# Patient Record
Sex: FEMALE | Race: WHITE | NOT HISPANIC OR LATINO | Employment: UNEMPLOYED | URBAN - METROPOLITAN AREA
[De-identification: names, ages, dates, MRNs, and addresses within clinical notes are randomized per-mention and may not be internally consistent; named-entity substitution may affect disease eponyms.]

---

## 2017-07-14 RX ORDER — IBUPROFEN 800 MG/1
800 TABLET ORAL EVERY 8 HOURS PRN
Qty: 21 TABLET | Refills: 1 | OUTPATIENT
Start: 2017-07-14

## 2017-07-14 NOTE — TELEPHONE ENCOUNTER
REFILL REQUEST FOR IBUPROFEN. PT DELIVERED ON 12/17/2017 NO SHOWED  PP VISIT ON 01/27/2017 AND HAS NOT MADE ANY OTHER APPTS.

## 2018-02-05 ENCOUNTER — TELEPHONE (OUTPATIENT)
Dept: OBSTETRICS AND GYNECOLOGY | Age: 23
End: 2018-02-05

## 2018-03-01 ENCOUNTER — OFFICE VISIT (OUTPATIENT)
Dept: OBSTETRICS AND GYNECOLOGY | Age: 23
End: 2018-03-01

## 2018-03-01 ENCOUNTER — PROCEDURE VISIT (OUTPATIENT)
Dept: OBSTETRICS AND GYNECOLOGY | Age: 23
End: 2018-03-01

## 2018-03-01 VITALS
WEIGHT: 223 LBS | HEIGHT: 62 IN | BODY MASS INDEX: 41.04 KG/M2 | DIASTOLIC BLOOD PRESSURE: 82 MMHG | SYSTOLIC BLOOD PRESSURE: 122 MMHG

## 2018-03-01 DIAGNOSIS — O36.80X0 ENCOUNTER TO DETERMINE FETAL VIABILITY OF PREGNANCY, SINGLE OR UNSPECIFIED FETUS: ICD-10-CM

## 2018-03-01 DIAGNOSIS — Z34.01 ENCOUNTER FOR SUPERVISION OF NORMAL FIRST PREGNANCY IN FIRST TRIMESTER: ICD-10-CM

## 2018-03-01 DIAGNOSIS — Z13.89 SCREENING FOR BLOOD OR PROTEIN IN URINE: Primary | ICD-10-CM

## 2018-03-01 LAB
BILIRUB BLD-MCNC: NEGATIVE MG/DL
CLARITY, POC: CLEAR
COLOR UR: YELLOW
GLUCOSE UR STRIP-MCNC: NEGATIVE MG/DL
KETONES UR QL: NEGATIVE
LEUKOCYTE EST, POC: ABNORMAL
NITRITE UR-MCNC: NEGATIVE MG/ML
PH UR: 6.5 [PH] (ref 5–8)
PROT UR STRIP-MCNC: ABNORMAL MG/DL
RBC # UR STRIP: NEGATIVE /UL
SP GR UR: 1.02 (ref 1–1.03)
UROBILINOGEN UR QL: NORMAL

## 2018-03-01 PROCEDURE — 99395 PREV VISIT EST AGE 18-39: CPT | Performed by: NURSE PRACTITIONER

## 2018-03-01 PROCEDURE — 99213 OFFICE O/P EST LOW 20 MIN: CPT | Performed by: NURSE PRACTITIONER

## 2018-03-01 PROCEDURE — 76817 TRANSVAGINAL US OBSTETRIC: CPT | Performed by: OBSTETRICS & GYNECOLOGY

## 2018-03-01 NOTE — PROGRESS NOTES
[facility]  Routine Annual Visit    3/1/2018    Patient: Rhina Marquez          MR#:[id number]      History of Present Illness    22 y.o. female  who presents for annual exam and for confirmation of pregnancy. US confirms IUP at 7 weeks 5 days. This is Rhina's fourth pregnancy. Hx of gestational hypertension, LGA. Vaginal deliveries  and 2016- delivery notes in system; spontaneous  . This pregnancy was planned and welcomed. Rhina has no complaints today.      Patient's last menstrual period was 2018 (exact date).  Obstetric History:  OB History      Para Term  AB Living    4 2 2 0 1 2    SAB TAB Ectopic Multiple Live Births    1 0 0 0 2        Obstetric Comments    16 ultrasound 22 weeks anatomy normal incomplete, estimated fetal weight 54th percentile.[facility]  11.15.16 - 32-3 weeks - EFW 80%, AC >97% - (5lb 3oz) - JHF   16 ultrasound 35 weeks 6 days estimated fetal weight 89th percentile, abdominal circumfere nce greater than 97th percentile, amniotic fluid index 18 cm, biophysical profile 8 out of 8. JKB         Menstrual History:     Patient's last menstrual period was 2018 (exact date).       Sexual History:       ________________________________________  Patient Active Problem List   Diagnosis   • Maternal history of obstetrical complications   • Maternal varicella, non-immune   • Rubella non-immune status, antepartum   • UTI in pregnancy   • Generalized convulsive seizures   • Palpitations   • Pregnancy   • Preeclampsia   •  (spontaneous vaginal delivery)       Past Medical History:   Diagnosis Date   • Anxiety    • Asthma    • Convulsive seizure     Neurologic workup to date is inconclusive for seizure disorder, patient did not follow through with EEG, saw neurologist Dr. Willam Calderon   • Eye infection    • Eye infection    • Mild preeclampsia    • Obstetrical complication    • Preeclampsia    • Rubella  non-immune status, antepartum    • UTI in pregnancy        Past Surgical History:   Procedure Laterality Date   • EAR TUBES         History   Smoking Status   • Former Smoker   Smokeless Tobacco   • Never Used       Family History   Problem Relation Age of Onset   • Hypertension Father    • Cancer Maternal Grandmother    • Stroke Maternal Grandfather    • Diabetes Maternal Grandfather    • Migraines Maternal Grandfather    • Dementia Maternal Grandfather    • Stroke Maternal Aunt    • Diabetes Maternal Uncle    • Migraines Mother    • Arthritis Mother        Prior to Admission medications    Medication Sig Start Date End Date Taking? Authorizing Provider   PRENATAL GUMMY VITAMIN Chew 1 each Daily.    Historical Provider, MD   acetaminophen (TYLENOL) 500 MG tablet Take 1,000 mg by mouth Every 6 (Six) Hours As Needed for mild pain (1-3).  3/1/18  Historical Provider, MD   ibuprofen (ADVIL,MOTRIN) 800 MG tablet Take 1 tablet by mouth Every 8 (Eight) Hours As Needed for mild pain (1-3). 12/19/16 3/1/18  Willam Crouch MD   Prenatal MV-Min-Fe Fum-FA-DHA (PRENATAL 1 PO) Take  by mouth.  3/1/18  Historical Provider, MD     ________________________________________    The following portions of the patient's history were reviewed and updated as appropriate: allergies, current medications, past family history, past medical history, past social history, past surgical history and problem list.    Review of Systems   Constitutional: Negative.    HENT: Negative.    Eyes: Negative for visual disturbance.   Respiratory: Negative for cough, shortness of breath and wheezing.    Cardiovascular: Negative for chest pain, palpitations and leg swelling.   Gastrointestinal: Negative for abdominal distention, abdominal pain, blood in stool, constipation, diarrhea, nausea and vomiting.   Endocrine: Negative for cold intolerance and heat intolerance.   Genitourinary: Negative for difficulty urinating, dyspareunia, dysuria, frequency,  "genital sores, hematuria, menstrual problem, pelvic pain, urgency, vaginal bleeding, vaginal discharge and vaginal pain.   Musculoskeletal: Negative.    Skin: Negative.    Neurological: Negative for dizziness, weakness, light-headedness, numbness and headaches.   Hematological: Negative.    Psychiatric/Behavioral: Negative.    Breasts: negative for lumps skin changes, dimpling, swelling, nipple changes/discharge bilaterally       Objective   Physical Exam    /82  Ht 157.5 cm (62\")  Wt 101 kg (223 lb)  LMP 01/06/2018 (Exact Date)  Breastfeeding? No  BMI 40.79 kg/m2   BP Readings from Last 3 Encounters:   03/01/18 122/82   12/19/16 138/88   12/09/16 134/92      Wt Readings from Last 3 Encounters:   03/01/18 101 kg (223 lb)   12/16/16 96.3 kg (212 lb 4.8 oz) (98 %, Z= 2.11)*   12/09/16 98.9 kg (218 lb) (99 %, Z= 2.19)*     * Growth percentiles are based on CDC 2-20 Years data.        BMI: Estimated body mass index is 40.79 kg/(m^2) as calculated from the following:    Height as of this encounter: 157.5 cm (62\").    Weight as of this encounter: 101 kg (223 lb).      General:   alert, appears stated age and cooperative   Heart: regular rate and rhythm, S1, S2 normal, no murmur, click, rub or gallop   Lungs: clear to auscultation bilaterally   Abdomen: soft, non-tender, without masses or organomegaly   Breast: inspection negative, no nipple discharge or bleeding, no masses or nodularity palpable   Vulva: Deferred   Vagina: deferred    Cervix: Deferred    Uterus: Deferred   Adnexa: Deferred     Assessment:    1. Normal annual exam   2. IUP at 7 weeks 5 days with MICHAEL 10/13/18  3.  Counseled on healthy lifestyle modifications, safe sex, condom use, and self breast exams.  4. Early pregnancy counseling provided   Patient is taking Prenatal vitamins  Problem list reviewed and updated  Reviewed routine prenatal care with the office to include but not limited to expected weight gain during pregnancy, Tylenol products " are fine, avoid aspirin and ibuprofen; Zika (travel restrictions/ok to use insect repellant); not to change cat litter; food restrictions; avoidance of alcohol, tobacco, drugs and saunas/hot tubs.   All questions answered.  5. SAB warnings  6.  Counseled on healthy lifestyle modifications, safe sex, condom use, and self breast exams.      RTO 4 weeks OB intake      Plan:    []  Rx:   []  Mammogram request made  []  PAP done  []  Occult fecal blood test (Insure)  [x]  Labs: prenatal   [x]  GC/Chl/TV  []  DEXA scan   []  Referral for colonoscopy:           Caroline Arreguin, JOAN  3/1/2018 11:21 AM

## 2018-03-02 LAB
ABO GROUP BLD: NORMAL
BLD GP AB SCN SERPL QL: NEGATIVE
ERYTHROCYTE [DISTWIDTH] IN BLOOD BY AUTOMATED COUNT: 14.4 % (ref 12.3–15.4)
HBA1C MFR BLD: 4.9 % (ref 4.8–5.6)
HBV SURFACE AG SERPL QL IA: NEGATIVE
HCT VFR BLD AUTO: 40.1 % (ref 34–46.6)
HCV AB S/CO SERPL IA: <0.1 S/CO RATIO (ref 0–0.9)
HGB A MFR BLD: 97.5 % (ref 96.4–98.8)
HGB A2 MFR BLD COLUMN CHROM: 2.5 % (ref 1.8–3.2)
HGB BLD-MCNC: 13.3 G/DL (ref 11.1–15.9)
HGB C MFR BLD: 0 %
HGB F MFR BLD: 0 % (ref 0–2)
HGB FRACT BLD-IMP: NORMAL
HGB S BLD QL SOLY: NEGATIVE
HGB S MFR BLD: 0 %
HIV 1+2 AB+HIV1 P24 AG SERPL QL IA: NON REACTIVE
MCH RBC QN AUTO: 29.9 PG (ref 26.6–33)
MCHC RBC AUTO-ENTMCNC: 33.2 G/DL (ref 31.5–35.7)
MCV RBC AUTO: 90 FL (ref 79–97)
PLATELET # BLD AUTO: 345 X10E3/UL (ref 150–379)
RBC # BLD AUTO: 4.45 X10E6/UL (ref 3.77–5.28)
RH BLD: POSITIVE
RPR SER QL: NON REACTIVE
RUBV IGG SERPL IA-ACNC: 2.36 INDEX
TSH SERPL DL<=0.005 MIU/L-ACNC: 1.64 UIU/ML (ref 0.45–4.5)
VZV IGG SER IA-ACNC: <135 INDEX
WBC # BLD AUTO: 11.1 X10E3/UL (ref 3.4–10.8)

## 2018-03-03 LAB
BACTERIA UR CULT: NORMAL
BACTERIA UR CULT: NORMAL
C TRACH RRNA SPEC QL NAA+PROBE: NEGATIVE
N GONORRHOEA RRNA SPEC QL NAA+PROBE: NEGATIVE
T VAGINALIS RRNA SPEC QL NAA+PROBE: NEGATIVE

## 2018-03-05 ENCOUNTER — TELEPHONE (OUTPATIENT)
Dept: OBSTETRICS AND GYNECOLOGY | Age: 23
End: 2018-03-05

## 2018-03-05 PROBLEM — O09.299 HX OF PREECLAMPSIA, PRIOR PREGNANCY, CURRENTLY PREGNANT: Status: ACTIVE | Noted: 2018-03-05

## 2018-03-05 NOTE — TELEPHONE ENCOUNTER
----- Message from JOAN Chavez sent at 3/5/2018  9:51 AM EST -----  Please inform patient her prenatal panel returned unremarkable. She is varicella non-immune. Immunization recommended postpartum. She she avoid contact with individuals with shingles or chicken pox. Thank you.

## 2018-03-27 ENCOUNTER — INITIAL PRENATAL (OUTPATIENT)
Dept: OBSTETRICS AND GYNECOLOGY | Age: 23
End: 2018-03-27

## 2018-03-27 VITALS — BODY MASS INDEX: 41.15 KG/M2 | WEIGHT: 225 LBS | DIASTOLIC BLOOD PRESSURE: 82 MMHG | SYSTOLIC BLOOD PRESSURE: 124 MMHG

## 2018-03-27 DIAGNOSIS — Z28.39 MATERNAL VARICELLA, NON-IMMUNE: ICD-10-CM

## 2018-03-27 DIAGNOSIS — O09.899 MATERNAL VARICELLA, NON-IMMUNE: ICD-10-CM

## 2018-03-27 DIAGNOSIS — Z34.81 PRENATAL CARE, SUBSEQUENT PREGNANCY IN FIRST TRIMESTER: Primary | ICD-10-CM

## 2018-03-27 DIAGNOSIS — Z13.89 SCREENING FOR BLOOD OR PROTEIN IN URINE: ICD-10-CM

## 2018-03-27 DIAGNOSIS — O09.299 HX OF PREECLAMPSIA, PRIOR PREGNANCY, CURRENTLY PREGNANT: ICD-10-CM

## 2018-03-27 LAB
BILIRUB BLD-MCNC: NEGATIVE MG/DL
CLARITY, POC: CLEAR
COLOR UR: YELLOW
GLUCOSE UR STRIP-MCNC: NEGATIVE MG/DL
KETONES UR QL: ABNORMAL
LEUKOCYTE EST, POC: ABNORMAL
NITRITE UR-MCNC: NEGATIVE MG/ML
PH UR: 6 [PH] (ref 5–8)
PROT UR STRIP-MCNC: NEGATIVE MG/DL
RBC # UR STRIP: NEGATIVE /UL
SP GR UR: 1.03 (ref 1–1.03)
UROBILINOGEN UR QL: NORMAL

## 2018-03-27 PROCEDURE — 99213 OFFICE O/P EST LOW 20 MIN: CPT | Performed by: OBSTETRICS & GYNECOLOGY

## 2018-03-27 NOTE — PROGRESS NOTES
Chief Complaint   Patient presents with   • Routine Prenatal Visit     cc:  OB Intake. No c/o.rlr       HPI: 23 y.o.  at 11w3d presents for ob intake - prenatal labs reviewed - denies complaints     Vitals:    18 1309   BP: 124/82   Weight: 102 kg (225 lb)       ROS:  GI:  Negative  : neg  Pulmonary: Negative     A/P  1. Intrauterine pregnancy at 11w3d   2. Pregnancy Risk:  HIGH RISK    Rhina was seen today for routine prenatal visit.    Diagnoses and all orders for this visit:    Prenatal care, subsequent pregnancy in first trimester    Screening for blood or protein in urine  -     POC Urinalysis Dipstick    Maternal varicella, non-immune    Hx of preeclampsia, prior pregnancy, currently pregnant  -     aspirin 81 MG tablet; Take 1 tablet by mouth Daily.        -----------------------  PLAN:   Return in about 4 weeks (around 2018), or ob check .  H/o PreE - start ASA at 12 weeks   OB intake done   Prenatal labs reviewed   VNI - varivax PP  Desires Nexgen today   SAB warnings     Anali Del Angel MD  3/27/2018 1:28 PM

## 2018-04-04 ENCOUNTER — TELEPHONE (OUTPATIENT)
Dept: OBSTETRICS AND GYNECOLOGY | Age: 23
End: 2018-04-04

## 2018-04-09 ENCOUNTER — TELEPHONE (OUTPATIENT)
Dept: OBSTETRICS AND GYNECOLOGY | Age: 23
End: 2018-04-09

## 2018-04-09 NOTE — TELEPHONE ENCOUNTER
----- Message from Anali Del Angel MD sent at 4/6/2018 10:07 AM EDT -----  Please call patient and notify of normal results of genetic screening and it's a boy!

## 2018-04-25 ENCOUNTER — ROUTINE PRENATAL (OUTPATIENT)
Dept: OBSTETRICS AND GYNECOLOGY | Age: 23
End: 2018-04-25

## 2018-04-25 VITALS — BODY MASS INDEX: 40.31 KG/M2 | WEIGHT: 220.4 LBS | SYSTOLIC BLOOD PRESSURE: 134 MMHG | DIASTOLIC BLOOD PRESSURE: 78 MMHG

## 2018-04-25 DIAGNOSIS — Z13.89 SCREENING FOR BLOOD OR PROTEIN IN URINE: Primary | ICD-10-CM

## 2018-04-25 DIAGNOSIS — O26.892 HEADACHE IN PREGNANCY, ANTEPARTUM, SECOND TRIMESTER: ICD-10-CM

## 2018-04-25 DIAGNOSIS — R51.9 HEADACHE IN PREGNANCY, ANTEPARTUM, SECOND TRIMESTER: ICD-10-CM

## 2018-04-25 DIAGNOSIS — Z34.02 ENCOUNTER FOR SUPERVISION OF NORMAL FIRST PREGNANCY IN SECOND TRIMESTER: ICD-10-CM

## 2018-04-25 LAB
BILIRUB BLD-MCNC: NEGATIVE MG/DL
CLARITY, POC: CLEAR
COLOR UR: YELLOW
GLUCOSE UR STRIP-MCNC: NEGATIVE MG/DL
KETONES UR QL: ABNORMAL
LEUKOCYTE EST, POC: NEGATIVE
NITRITE UR-MCNC: NEGATIVE MG/ML
PH UR: 6 [PH] (ref 5–8)
PROT UR STRIP-MCNC: ABNORMAL MG/DL
RBC # UR STRIP: NEGATIVE /UL
SP GR UR: 1.03 (ref 1–1.03)
UROBILINOGEN UR QL: NORMAL

## 2018-04-25 PROCEDURE — 99214 OFFICE O/P EST MOD 30 MIN: CPT | Performed by: NURSE PRACTITIONER

## 2018-04-25 NOTE — PROGRESS NOTES
HPI: 23 y.o.  at 15w4d presents for routine prenatal visit with complaint of intermittent headaches- admits headaches during each of her past pregnancies. Was worked up by neuro last pregnancy but pt did not care for the provider- willing to see new neurologist for further evaluation. Has not taken anything OTC for relief.     Vitals:    18 1333   BP: 134/78   Weight: 100 kg (220 lb 6.4 oz)     ROS:  GI:  Negative  : negative  Pulmonary: Negative     A/P  1. Intrauterine pregnancy at 15w4d     Rhina was seen today for routine prenatal visit.    Diagnoses and all orders for this visit:    Screening for blood or protein in urine  -     POC Urinalysis Dipstick    Encounter for supervision of normal first pregnancy in second trimester    Headache in pregnancy, antepartum, second trimester  -     Ambulatory Referral to Neurology        -----------------------  PLAN:   Return in about 4 weeks (around 2018), or if symptoms worsen or fail to improve.     Headaches- referral to neuro placed; comfort and preventative measures given; warning signs give; ER for severe headaches with neuro sxs  4 weeks anatomy scan and OB visit  SAB warnings      Caroline Arreguin, JOAN  2018 3:53 PM

## 2018-05-29 ENCOUNTER — ROUTINE PRENATAL (OUTPATIENT)
Dept: OBSTETRICS AND GYNECOLOGY | Age: 23
End: 2018-05-29

## 2018-05-29 ENCOUNTER — PROCEDURE VISIT (OUTPATIENT)
Dept: OBSTETRICS AND GYNECOLOGY | Age: 23
End: 2018-05-29

## 2018-05-29 VITALS — DIASTOLIC BLOOD PRESSURE: 70 MMHG | SYSTOLIC BLOOD PRESSURE: 118 MMHG | BODY MASS INDEX: 40.42 KG/M2 | WEIGHT: 221 LBS

## 2018-05-29 DIAGNOSIS — O09.299 HX OF PREECLAMPSIA, PRIOR PREGNANCY, CURRENTLY PREGNANT: ICD-10-CM

## 2018-05-29 DIAGNOSIS — Z36.89 SCREENING, ANTENATAL, FOR FETAL ANATOMIC SURVEY: Primary | ICD-10-CM

## 2018-05-29 DIAGNOSIS — O09.899 MATERNAL VARICELLA, NON-IMMUNE: ICD-10-CM

## 2018-05-29 DIAGNOSIS — Z36.9 ANTENATAL SCREENING ENCOUNTER: ICD-10-CM

## 2018-05-29 DIAGNOSIS — Z3A.20 20 WEEKS GESTATION OF PREGNANCY: ICD-10-CM

## 2018-05-29 DIAGNOSIS — Z28.39 MATERNAL VARICELLA, NON-IMMUNE: ICD-10-CM

## 2018-05-29 DIAGNOSIS — O99.212 OBESITY AFFECTING PREGNANCY IN SECOND TRIMESTER: ICD-10-CM

## 2018-05-29 DIAGNOSIS — Z34.82 PRENATAL CARE, SUBSEQUENT PREGNANCY IN SECOND TRIMESTER: Primary | ICD-10-CM

## 2018-05-29 DIAGNOSIS — M54.32 SCIATICA, LEFT SIDE: ICD-10-CM

## 2018-05-29 DIAGNOSIS — IMO0002 EVALUATE ANATOMY NOT SEEN ON PRIOR SONOGRAM: ICD-10-CM

## 2018-05-29 DIAGNOSIS — Z36.86 ENCOUNTER FOR ANTENATAL SCREENING FOR CERVICAL LENGTH: ICD-10-CM

## 2018-05-29 LAB
BILIRUB BLD-MCNC: NEGATIVE MG/DL
GLUCOSE UR STRIP-MCNC: NEGATIVE MG/DL
KETONES UR QL: NEGATIVE
LEUKOCYTE EST, POC: NEGATIVE
NITRITE UR-MCNC: NEGATIVE MG/ML
PH UR: 6.5 [PH] (ref 5–8)
PROT UR STRIP-MCNC: NEGATIVE MG/DL
RBC # UR STRIP: NEGATIVE /UL
SP GR UR: 1.03 (ref 1–1.03)
UROBILINOGEN UR QL: NORMAL

## 2018-05-29 PROCEDURE — 76817 TRANSVAGINAL US OBSTETRIC: CPT | Performed by: OBSTETRICS & GYNECOLOGY

## 2018-05-29 PROCEDURE — 76805 OB US >/= 14 WKS SNGL FETUS: CPT | Performed by: OBSTETRICS & GYNECOLOGY

## 2018-05-29 PROCEDURE — 99213 OFFICE O/P EST LOW 20 MIN: CPT | Performed by: OBSTETRICS & GYNECOLOGY

## 2018-05-29 NOTE — PROGRESS NOTES
Chief Complaint   Patient presents with   • Pregnancy Ultrasound     Pt here for routine ob check along with anatomy scan.        HPI: 23 y.o.  at 20w3d presents for prenatal care - denies complaints - normal anatomy today but incomplete      Vitals:    18 1527   BP: 118/70   Weight: 100 kg (221 lb)       ROS:  GI:  Negative  : neg  Pulmonary: Negative     A/P  1. Intrauterine pregnancy at 20w3d   2. Pregnancy Risk:  HIGH RISK    Rhina was seen today for pregnancy ultrasound.    Diagnoses and all orders for this visit:    Prenatal care, subsequent pregnancy in second trimester    20 weeks gestation of pregnancy  -     POC Urinalysis Dipstick     screening encounter  -     Alpha Fetoprotein, Maternal    Obesity affecting pregnancy in second trimester  -     Hemoglobin A1c    Hx of preeclampsia, prior pregnancy, currently pregnant    Maternal varicella, non-immune    Sciatica, left side    Evaluate anatomy not seen on prior sonogram        -----------------------  PLAN:   Return in about 4 weeks (around 2018), or ob check with me and repeat anatomy .  Desiires AFP today  H/O of preE - cannot tolerate ASA  Sciatica - referral to PT   Repeat anatomy 4 weeks     Anali Del Angel MD  2018 4:45 PM

## 2018-05-30 LAB — HBA1C MFR BLD: 5.1 % (ref 4.8–5.6)

## 2018-06-01 ENCOUNTER — TELEPHONE (OUTPATIENT)
Dept: OBSTETRICS AND GYNECOLOGY | Age: 23
End: 2018-06-01

## 2018-06-01 LAB
AFP ADJ MOM SERPL: 0.83
AFP INTERP SERPL-IMP: NORMAL
AFP INTERP SERPL-IMP: NORMAL
AFP SERPL-MCNC: 38.5 NG/ML
AGE AT DELIVERY: 21.5 YR
GA METHOD: NORMAL
GA: 20.4 WEEKS
IDDM PATIENT QL: NO
LABORATORY COMMENT REPORT: NORMAL
MULTIPLE PREGNANCY: NO
NEURAL TUBE DEFECT RISK FETUS: NORMAL %
RESULT: NORMAL

## 2018-06-01 NOTE — TELEPHONE ENCOUNTER
----- Message from Anali Del Angel MD sent at 5/31/2018  5:52 PM EDT -----  Please call patient and notify of normal results of hgb A1c

## 2018-06-03 ENCOUNTER — HOSPITAL ENCOUNTER (OUTPATIENT)
Facility: HOSPITAL | Age: 23
Setting detail: OBSERVATION
Discharge: HOME | End: 2018-06-03
Attending: OBSTETRICS & GYNECOLOGY | Admitting: OBSTETRICS & GYNECOLOGY

## 2018-06-03 VITALS
BODY MASS INDEX: 36.82 KG/M2 | HEART RATE: 94 BPM | WEIGHT: 221 LBS | DIASTOLIC BLOOD PRESSURE: 60 MMHG | HEIGHT: 65 IN | RESPIRATION RATE: 18 BRPM | SYSTOLIC BLOOD PRESSURE: 116 MMHG | TEMPERATURE: 99.2 F

## 2018-06-03 PROBLEM — Z34.90 PREGNANCY: Status: ACTIVE | Noted: 2018-06-03

## 2018-06-03 LAB
EXPIRATION DATE: NORMAL
Lab: NORMAL
PROT UR STRIP-MCNC: NEGATIVE MG/DL

## 2018-06-03 PROCEDURE — 81002 URINALYSIS NONAUTO W/O SCOPE: CPT | Performed by: OBSTETRICS & GYNECOLOGY

## 2018-06-03 PROCEDURE — G0378 HOSPITAL OBSERVATION PER HR: HCPCS

## 2018-06-03 NOTE — NURSING NOTE
"Arrived to Labor and Delivery with complaints of \"elevated blood pressure\" at [facility] twice today.  Admission Triage assessment completed.  Fetal heart rate doppler at 135.  Instructed to lay in left lateral before taking blood pressures.    "

## 2018-06-04 ENCOUNTER — TELEPHONE (OUTPATIENT)
Dept: OBSTETRICS AND GYNECOLOGY | Age: 23
End: 2018-06-04

## 2018-06-04 NOTE — NURSING NOTE
Discharged to home with instructions to call MD office in the AM if problems continue or to keep next MD appointment.  Instructed to return to labor and delivery with any concerns of self or fetus.

## 2018-06-04 NOTE — TELEPHONE ENCOUNTER
----- Message from Anali Del Angel MD sent at 6/4/2018  2:40 PM EDT -----  Please call patient and notify of normal results of AFP - screening for neural tube defects

## 2018-06-04 NOTE — TELEPHONE ENCOUNTER
"Rhina Marquez, a  at 21w1d with an MICHAEL of 10/13/2018, Date entered prior to episode creation, was seen at [facility] for a nonstress test.          Chief Complaint   Patient presents with   • Hypertension       went to   urgent care yesterday for \"'pink eye\", and they notiiced that her blood pressure was elevated.  Took blood pressure twice today at [facility] and was 150/100               Fetal heart rate 135 on admission     Sanam Suarez"

## 2018-06-04 NOTE — NON STRESS TEST
"  Rhina Marquez, a  at 21w1d with an MICHAEL of 10/13/2018, Date entered prior to episode creation, was seen at [facility] DEL[facility] for a nonstress test.    Chief Complaint   Patient presents with   • Hypertension     went to   urgent care yesterday for \"'pink eye\", and they notiiced that her blood pressure was elevated.  Took blood pressure twice today at [facility] and was 150/100              Fetal heart rate 135 on admission    Sanam Suarez"

## 2018-06-04 NOTE — TELEPHONE ENCOUNTER
Pt states was told yesterday that she needs to call office today so MD can write a prescription for a BP cuff?

## 2018-06-04 NOTE — DISCHARGE INSTR - LAB
Discharged to home with instructions to keep next MD appointment, and to call MD or return to labor and delivery with any concerns of self of fetus.

## 2018-06-06 ENCOUNTER — ROUTINE PRENATAL (OUTPATIENT)
Dept: OBSTETRICS AND GYNECOLOGY | Age: 23
End: 2018-06-06

## 2018-06-06 VITALS — WEIGHT: 221 LBS | SYSTOLIC BLOOD PRESSURE: 140 MMHG | DIASTOLIC BLOOD PRESSURE: 78 MMHG | BODY MASS INDEX: 36.78 KG/M2

## 2018-06-06 DIAGNOSIS — Z13.89 SCREENING FOR BLOOD OR PROTEIN IN URINE: Primary | ICD-10-CM

## 2018-06-06 DIAGNOSIS — O10.919 CHRONIC HYPERTENSION AFFECTING PREGNANCY: ICD-10-CM

## 2018-06-06 DIAGNOSIS — O09.299 HX OF PREECLAMPSIA, PRIOR PREGNANCY, CURRENTLY PREGNANT: ICD-10-CM

## 2018-06-06 DIAGNOSIS — G89.29 CHRONIC INTRACTABLE HEADACHE, UNSPECIFIED HEADACHE TYPE: ICD-10-CM

## 2018-06-06 DIAGNOSIS — R51.9 CHRONIC INTRACTABLE HEADACHE, UNSPECIFIED HEADACHE TYPE: ICD-10-CM

## 2018-06-06 PROBLEM — O26.892 HEADACHE IN PREGNANCY, ANTEPARTUM, SECOND TRIMESTER: Status: RESOLVED | Noted: 2018-04-25 | Resolved: 2018-06-06

## 2018-06-06 LAB
BILIRUB BLD-MCNC: NEGATIVE MG/DL
CLARITY, POC: CLEAR
COLOR UR: YELLOW
GLUCOSE UR STRIP-MCNC: NEGATIVE MG/DL
KETONES UR QL: NEGATIVE
LEUKOCYTE EST, POC: NORMAL
NITRITE UR-MCNC: NEGATIVE MG/ML
PH UR: 7 [PH] (ref 5–8)
PROT UR STRIP-MCNC: NEGATIVE MG/DL
RBC # UR STRIP: NEGATIVE /UL
SP GR UR: 1.02 (ref 1–1.03)
UROBILINOGEN UR QL: NORMAL

## 2018-06-06 PROCEDURE — 99214 OFFICE O/P EST MOD 30 MIN: CPT | Performed by: NURSE PRACTITIONER

## 2018-06-06 RX ORDER — ERYTHROMYCIN 5 MG/G
OINTMENT OPHTHALMIC
COMMUNITY
Start: 2018-05-31 | End: 2018-06-12

## 2018-06-06 RX ORDER — LABETALOL 200 MG/1
200 TABLET, FILM COATED ORAL EVERY 12 HOURS SCHEDULED
Qty: 60 TABLET | Refills: 3 | Status: SHIPPED | OUTPATIENT
Start: 2018-06-06 | End: 2018-09-16 | Stop reason: HOSPADM

## 2018-06-06 RX ORDER — OFLOXACIN 3 MG/ML
SOLUTION/ DROPS OPHTHALMIC
COMMUNITY
Start: 2018-06-02 | End: 2018-06-12

## 2018-06-06 NOTE — PROGRESS NOTES
Chief Complaint   Patient presents with   • Follow-up     elevated BP over weekend       HPI: 23 y.o.  at 21w4d presents for problem visit regarding concerns with elevated BPs over the weekend. Blood pressure mildly elevated in office today x 2. Chronic hypertension likely. Hx mild gestational hypertension with previous pregnancy. Baseline labs drawn today along with 24 urine. Elevated blood pressures is complicated by chronic headaches- referral to neuro has already been placed and pt is seeing on 18.    Vitals:    18 1311   BP: 140/78   Weight: 100 kg (221 lb)       ROS:  Neuro- +headache  GI:  Negative  : Negative  Pulmonary: Negative     A/P  1. Intrauterine pregnancy at 21w4d   2. Pregnancy Risk:  HIGH RISK    Rhina was seen today for follow-up.    Diagnoses and all orders for this visit:    Screening for blood or protein in urine  -     POC Urinalysis Dipstick    Chronic hypertension affecting pregnancy  -     Comprehensive Metabolic Panel  -     CBC & Differential  -     Protein, Urine, 24 Hour - Urine, Clean Catch    Hx of preeclampsia, prior pregnancy, currently pregnant  -     Comprehensive Metabolic Panel  -     CBC & Differential  -     Protein, Urine, 24 Hour - Urine, Clean Catch    Chronic intractable headache, unspecified headache type     - seeing neuro 18    -----------------------  PLAN:   Chronic HTN- CMP, CBC, and 24 hr urine  Begin labetalol 200 mg BID  Begin serial growths around 28 weeks  F/U 1 week for BP check    JOAN Leach  2018 1:51 PM

## 2018-06-07 LAB
ALBUMIN SERPL-MCNC: 3.7 G/DL (ref 3.5–5.5)
ALBUMIN/GLOB SERPL: 1.4 {RATIO} (ref 1.2–2.2)
ALP SERPL-CCNC: 74 IU/L (ref 39–117)
ALT SERPL-CCNC: 11 IU/L (ref 0–32)
AST SERPL-CCNC: 10 IU/L (ref 0–40)
BASOPHILS # BLD AUTO: 0 X10E3/UL (ref 0–0.2)
BASOPHILS NFR BLD AUTO: 0 %
BILIRUB SERPL-MCNC: <0.2 MG/DL (ref 0–1.2)
BUN SERPL-MCNC: 9 MG/DL (ref 6–20)
BUN/CREAT SERPL: 17 (ref 9–23)
CALCIUM SERPL-MCNC: 9.3 MG/DL (ref 8.7–10.2)
CHLORIDE SERPL-SCNC: 102 MMOL/L (ref 96–106)
CO2 SERPL-SCNC: 21 MMOL/L (ref 18–29)
CREAT SERPL-MCNC: 0.52 MG/DL (ref 0.57–1)
EOSINOPHIL # BLD AUTO: 0.2 X10E3/UL (ref 0–0.4)
EOSINOPHIL NFR BLD AUTO: 1 %
ERYTHROCYTE [DISTWIDTH] IN BLOOD BY AUTOMATED COUNT: 13.9 % (ref 12.3–15.4)
GFR SERPLBLD CREATININE-BSD FMLA CKD-EPI: 137 ML/MIN/1.73
GFR SERPLBLD CREATININE-BSD FMLA CKD-EPI: 158 ML/MIN/1.73
GLOBULIN SER CALC-MCNC: 2.6 G/DL (ref 1.5–4.5)
GLUCOSE SERPL-MCNC: 76 MG/DL (ref 65–99)
HCT VFR BLD AUTO: 39.1 % (ref 34–46.6)
HGB BLD-MCNC: 12.8 G/DL (ref 11.1–15.9)
IMM GRANULOCYTES # BLD: 0 X10E3/UL (ref 0–0.1)
IMM GRANULOCYTES NFR BLD: 0 %
LYMPHOCYTES # BLD AUTO: 2.2 X10E3/UL (ref 0.7–3.1)
LYMPHOCYTES NFR BLD AUTO: 16 %
MCH RBC QN AUTO: 29.6 PG (ref 26.6–33)
MCHC RBC AUTO-ENTMCNC: 32.7 G/DL (ref 31.5–35.7)
MCV RBC AUTO: 90 FL (ref 79–97)
MONOCYTES # BLD AUTO: 0.7 X10E3/UL (ref 0.1–0.9)
MONOCYTES NFR BLD AUTO: 5 %
NEUTROPHILS # BLD AUTO: 10.5 X10E3/UL (ref 1.4–7)
NEUTROPHILS NFR BLD AUTO: 78 %
PLATELET # BLD AUTO: 311 X10E3/UL (ref 150–379)
POTASSIUM SERPL-SCNC: 4.2 MMOL/L (ref 3.5–5.2)
PROT SERPL-MCNC: 6.3 G/DL (ref 6–8.5)
RBC # BLD AUTO: 4.33 X10E6/UL (ref 3.77–5.28)
SODIUM SERPL-SCNC: 140 MMOL/L (ref 134–144)
WBC # BLD AUTO: 13.7 X10E3/UL (ref 3.4–10.8)

## 2018-06-08 LAB
PROT 24H UR-MRATE: 165 MG/24HOURS (ref 0–150)
PROT UR-MCNC: 11 MG/DL

## 2018-06-12 ENCOUNTER — ROUTINE PRENATAL (OUTPATIENT)
Dept: OBSTETRICS AND GYNECOLOGY | Age: 23
End: 2018-06-12

## 2018-06-12 VITALS — DIASTOLIC BLOOD PRESSURE: 82 MMHG | WEIGHT: 222 LBS | SYSTOLIC BLOOD PRESSURE: 134 MMHG | BODY MASS INDEX: 36.94 KG/M2

## 2018-06-12 DIAGNOSIS — Z28.39 MATERNAL VARICELLA, NON-IMMUNE: ICD-10-CM

## 2018-06-12 DIAGNOSIS — IMO0002 EVALUATE ANATOMY NOT SEEN ON PRIOR SONOGRAM: ICD-10-CM

## 2018-06-12 DIAGNOSIS — Z13.89 SCREENING FOR BLOOD OR PROTEIN IN URINE: Primary | ICD-10-CM

## 2018-06-12 DIAGNOSIS — O99.212 OBESITY AFFECTING PREGNANCY IN SECOND TRIMESTER: ICD-10-CM

## 2018-06-12 DIAGNOSIS — O09.299 HX OF PREECLAMPSIA, PRIOR PREGNANCY, CURRENTLY PREGNANT: ICD-10-CM

## 2018-06-12 DIAGNOSIS — Z34.82 PRENATAL CARE, SUBSEQUENT PREGNANCY IN SECOND TRIMESTER: ICD-10-CM

## 2018-06-12 DIAGNOSIS — O09.899 MATERNAL VARICELLA, NON-IMMUNE: ICD-10-CM

## 2018-06-12 DIAGNOSIS — O10.919 CHRONIC HYPERTENSION AFFECTING PREGNANCY: ICD-10-CM

## 2018-06-12 LAB
BILIRUB BLD-MCNC: NEGATIVE MG/DL
CLARITY, POC: CLEAR
COLOR UR: YELLOW
GLUCOSE UR STRIP-MCNC: NEGATIVE MG/DL
KETONES UR QL: ABNORMAL
LEUKOCYTE EST, POC: NEGATIVE
NITRITE UR-MCNC: NEGATIVE MG/ML
PH UR: 6.5 [PH] (ref 5–8)
PROT UR STRIP-MCNC: ABNORMAL MG/DL
RBC # UR STRIP: NEGATIVE /UL
SP GR UR: 1.03 (ref 1–1.03)
UROBILINOGEN UR QL: NORMAL

## 2018-06-12 PROCEDURE — 99213 OFFICE O/P EST LOW 20 MIN: CPT | Performed by: OBSTETRICS & GYNECOLOGY

## 2018-06-12 RX ORDER — ALBUTEROL SULFATE 90 UG/1
2 AEROSOL, METERED RESPIRATORY (INHALATION) EVERY 6 HOURS PRN
COMMUNITY
Start: 2018-06-09 | End: 2020-11-03

## 2018-06-12 RX ORDER — BENZONATATE 100 MG/1
100 CAPSULE ORAL NIGHTLY
COMMUNITY
Start: 2018-06-09 | End: 2018-06-26

## 2018-06-12 RX ORDER — AMOXICILLIN 875 MG/1
875 TABLET, COATED ORAL EVERY 12 HOURS SCHEDULED
COMMUNITY
Start: 2018-06-09 | End: 2018-06-26

## 2018-06-12 NOTE — PROGRESS NOTES
Chief Complaint   Patient presents with   • Routine Prenatal Visit     cc:  Treated for URI, ear infection.  Just got over pink eye.  Treated for BP's.rlr       HPI: 23 y.o.  at 22w3d presents for prenatal care - denies los of fluid, vag bleeding, ctx +FM     Vitals:    18 1440   BP: 134/82   Weight: 101 kg (222 lb)       ROS:  GI:  Negative  : neg  Pulmonary: Negative     A/P  1. Intrauterine pregnancy at 22w3d   2. Pregnancy Risk:  HIGH RISK    Rhina was seen today for routine prenatal visit.    Diagnoses and all orders for this visit:    Screening for blood or protein in urine  -     POC Urinalysis Dipstick    Chronic hypertension affecting pregnancy    Obesity affecting pregnancy in second trimester    Maternal varicella, non-immune    Hx of preeclampsia, prior pregnancy, currently pregnant        -----------------------  PLAN:   Return in about 2 weeks (around 2018).  CHTN - cont Labetalol 200 mg BID - could not tolerate ASA   Incomplete anatomy - US two weeks    PTL warnings   Obesity - doing well with weight       Anali Del Angel MD  2018 3:05 PM

## 2018-06-26 ENCOUNTER — ROUTINE PRENATAL (OUTPATIENT)
Dept: OBSTETRICS AND GYNECOLOGY | Age: 23
End: 2018-06-26

## 2018-06-26 ENCOUNTER — PROCEDURE VISIT (OUTPATIENT)
Dept: OBSTETRICS AND GYNECOLOGY | Age: 23
End: 2018-06-26

## 2018-06-26 VITALS — DIASTOLIC BLOOD PRESSURE: 80 MMHG | BODY MASS INDEX: 36.78 KG/M2 | WEIGHT: 221 LBS | SYSTOLIC BLOOD PRESSURE: 128 MMHG

## 2018-06-26 DIAGNOSIS — R51.9 CHRONIC INTRACTABLE HEADACHE, UNSPECIFIED HEADACHE TYPE: ICD-10-CM

## 2018-06-26 DIAGNOSIS — O10.919 CHRONIC HYPERTENSION AFFECTING PREGNANCY: ICD-10-CM

## 2018-06-26 DIAGNOSIS — O99.212 OBESITY AFFECTING PREGNANCY IN SECOND TRIMESTER: ICD-10-CM

## 2018-06-26 DIAGNOSIS — Z28.39 MATERNAL VARICELLA, NON-IMMUNE: ICD-10-CM

## 2018-06-26 DIAGNOSIS — Z34.82 PRENATAL CARE, SUBSEQUENT PREGNANCY IN SECOND TRIMESTER: Primary | ICD-10-CM

## 2018-06-26 DIAGNOSIS — G89.29 CHRONIC INTRACTABLE HEADACHE, UNSPECIFIED HEADACHE TYPE: ICD-10-CM

## 2018-06-26 DIAGNOSIS — Z13.89 SCREENING FOR BLOOD OR PROTEIN IN URINE: ICD-10-CM

## 2018-06-26 DIAGNOSIS — O09.299 HX OF PREECLAMPSIA, PRIOR PREGNANCY, CURRENTLY PREGNANT: ICD-10-CM

## 2018-06-26 DIAGNOSIS — IMO0002 EVALUATE ANATOMY NOT SEEN ON PRIOR SONOGRAM: ICD-10-CM

## 2018-06-26 DIAGNOSIS — IMO0002 EVALUATE ANATOMY NOT SEEN ON PRIOR SONOGRAM: Primary | ICD-10-CM

## 2018-06-26 DIAGNOSIS — O09.899 MATERNAL VARICELLA, NON-IMMUNE: ICD-10-CM

## 2018-06-26 PROCEDURE — 99213 OFFICE O/P EST LOW 20 MIN: CPT | Performed by: OBSTETRICS & GYNECOLOGY

## 2018-06-26 PROCEDURE — 76816 OB US FOLLOW-UP PER FETUS: CPT | Performed by: OBSTETRICS & GYNECOLOGY

## 2018-06-26 NOTE — PROGRESS NOTES
Chief Complaint   Patient presents with   • Routine Prenatal Visit     cc:  No c/o. Has been checking her BP regularly has been running normal (115/70) and lately a little higher (120/80)       HPI: 23 y.o.  at 24w3d presents for prenatal care - denies loss of fluid, vag bleeding or ctx - +fM     Vitals:    18 1536   BP: 128/80   Weight: 100 kg (221 lb)       ROS:  GI:  Negative  : neg  Pulmonary: Negative     A/P  1. Intrauterine pregnancy at 24w3d   2. Pregnancy Risk:  HIGH RISK    Rhina was seen today for routine prenatal visit.    Diagnoses and all orders for this visit:    Prenatal care, subsequent pregnancy in second trimester    Screening for blood or protein in urine  -     POC Urinalysis Dipstick    Evaluate anatomy not seen on prior sonogram    Obesity affecting pregnancy in second trimester    Chronic hypertension affecting pregnancy    Chronic intractable headache, unspecified headache type    Hx of preeclampsia, prior pregnancy, currently pregnant    Maternal varicella, non-immune        -----------------------  PLAN:   Return in about 3 weeks (around 2018), or ob check with me and one-hour gtt .  pTL warnings   Normal growth and complete anatomy today   CHTN - stable on labetalol 200 mg bID   Obesity - 2 lb weight loss !      Anali Del Angel MD  2018 4:08 PM

## 2018-07-19 ENCOUNTER — ROUTINE PRENATAL (OUTPATIENT)
Dept: OBSTETRICS AND GYNECOLOGY | Age: 23
End: 2018-07-19

## 2018-07-19 VITALS — SYSTOLIC BLOOD PRESSURE: 120 MMHG | DIASTOLIC BLOOD PRESSURE: 80 MMHG | BODY MASS INDEX: 36.94 KG/M2 | WEIGHT: 222 LBS

## 2018-07-19 DIAGNOSIS — O10.919 CHRONIC HYPERTENSION AFFECTING PREGNANCY: ICD-10-CM

## 2018-07-19 DIAGNOSIS — Z13.89 SCREENING FOR BLOOD OR PROTEIN IN URINE: ICD-10-CM

## 2018-07-19 DIAGNOSIS — Z28.39 MATERNAL VARICELLA, NON-IMMUNE: ICD-10-CM

## 2018-07-19 DIAGNOSIS — O24.419 GESTATIONAL DIABETES MELLITUS (GDM) IN SECOND TRIMESTER, GESTATIONAL DIABETES METHOD OF CONTROL UNSPECIFIED: ICD-10-CM

## 2018-07-19 DIAGNOSIS — O99.012 ANEMIA AFFECTING PREGNANCY IN SECOND TRIMESTER: ICD-10-CM

## 2018-07-19 DIAGNOSIS — Z13.0 SCREENING FOR IRON DEFICIENCY ANEMIA: ICD-10-CM

## 2018-07-19 DIAGNOSIS — O09.92 SUPERVISION OF HIGH RISK PREGNANCY IN SECOND TRIMESTER: Primary | ICD-10-CM

## 2018-07-19 DIAGNOSIS — O09.299 HX OF PREECLAMPSIA, PRIOR PREGNANCY, CURRENTLY PREGNANT: ICD-10-CM

## 2018-07-19 DIAGNOSIS — O09.899 MATERNAL VARICELLA, NON-IMMUNE: ICD-10-CM

## 2018-07-19 DIAGNOSIS — O99.212 OBESITY AFFECTING PREGNANCY IN SECOND TRIMESTER: ICD-10-CM

## 2018-07-19 PROBLEM — O09.90 HIGH-RISK PREGNANCY SUPERVISION: Status: ACTIVE | Noted: 2018-07-19

## 2018-07-19 LAB
BILIRUB BLD-MCNC: NEGATIVE MG/DL
GLUCOSE UR STRIP-MCNC: NEGATIVE MG/DL
KETONES UR QL: NEGATIVE
LEUKOCYTE EST, POC: NEGATIVE
NITRITE UR-MCNC: NEGATIVE MG/ML
PH UR: 7 [PH] (ref 5–8)
PROT UR STRIP-MCNC: NEGATIVE MG/DL
RBC # UR STRIP: NEGATIVE /UL
SP GR UR: 1.03 (ref 1–1.03)
UROBILINOGEN UR QL: NORMAL

## 2018-07-19 PROCEDURE — 99213 OFFICE O/P EST LOW 20 MIN: CPT | Performed by: OBSTETRICS & GYNECOLOGY

## 2018-07-19 PROCEDURE — 90471 IMMUNIZATION ADMIN: CPT | Performed by: OBSTETRICS & GYNECOLOGY

## 2018-07-19 PROCEDURE — 90715 TDAP VACCINE 7 YRS/> IM: CPT | Performed by: OBSTETRICS & GYNECOLOGY

## 2018-07-19 NOTE — PROGRESS NOTES
Chief Complaint   Patient presents with   • Routine Prenatal Visit     h-h , tdap , 1hr gtt, patient doing well, over the weekend patient had elevated /92       HPI: 23 y.o.  at 27w5d presents for prenatal care - denies loss of fluid, vaginal bleeding, ctx +FM      Vitals:    18 0931   BP: 120/80   Weight: 101 kg (222 lb)       ROS:  GI:  Negative  : neg  Pulmonary: Negative     A/P  1. Intrauterine pregnancy at 27w5d   2. Pregnancy Risk:  HIGH RISK    Rhina was seen today for routine prenatal visit.    Diagnoses and all orders for this visit:    Supervision of high risk pregnancy in second trimester    Screening for blood or protein in urine  -     POC Urinalysis Dipstick    Screening for iron deficiency anemia  -     Hemoglobin & Hematocrit, Blood    Gestational diabetes mellitus (GDM) in second trimester, gestational diabetes method of control unspecified  -     Gestational Screen 1 Hr ([facility])    Chronic hypertension affecting pregnancy    Obesity affecting pregnancy in second trimester    Hx of preeclampsia, prior pregnancy, currently pregnant    Maternal varicella, non-immune    Other orders  -     Tdap Vaccine Greater Than or Equal To 6yo IM        -----------------------  PLAN:   Return in about 2 weeks (around 2018), or ob check and growth uS .  PTL warnings   CHTN - con Labetalol 200 BID   Strict PreE warnings   Obesity - has lost weight     Anali Del Angel MD  2018 9:56 AM

## 2018-07-20 ENCOUNTER — TELEPHONE (OUTPATIENT)
Dept: OBSTETRICS AND GYNECOLOGY | Age: 23
End: 2018-07-20

## 2018-07-20 PROBLEM — O99.019 ANEMIA AFFECTING PREGNANCY: Status: ACTIVE | Noted: 2018-07-20

## 2018-07-20 LAB
GLUCOSE 1H P 50 G GLC PO SERPL-MCNC: 80 MG/DL (ref 65–139)
HCT VFR BLD AUTO: 34 % (ref 35.6–45.5)
HGB BLD-MCNC: 11.1 G/DL (ref 11.9–15.5)

## 2018-07-20 RX ORDER — IRON, FOLIC ACID, CYANOCOBALAMIN, ASCORBIC ACID, AND DOCUSATE SODIUM 138; 88.5; 55; 13.2; 1.4; 16.8 MG/1; MG/1; MG/1; MG/1; MG/1; UG/1
1 TABLET, FILM COATED ORAL DAILY
Qty: 30 TABLET | Refills: 5 | Status: ON HOLD | OUTPATIENT
Start: 2018-07-20 | End: 2018-09-07

## 2018-07-20 NOTE — PROGRESS NOTES
Please call patient and notify of normal results of one-hour gtt, but mildly anemic - new prenatal with iron sent to pharmacy

## 2018-07-31 ENCOUNTER — ROUTINE PRENATAL (OUTPATIENT)
Dept: OBSTETRICS AND GYNECOLOGY | Age: 23
End: 2018-07-31

## 2018-07-31 VITALS — DIASTOLIC BLOOD PRESSURE: 75 MMHG | SYSTOLIC BLOOD PRESSURE: 120 MMHG | BODY MASS INDEX: 36.94 KG/M2 | WEIGHT: 222 LBS

## 2018-07-31 DIAGNOSIS — O09.299 HX OF PREECLAMPSIA, PRIOR PREGNANCY, CURRENTLY PREGNANT: ICD-10-CM

## 2018-07-31 DIAGNOSIS — Z13.89 SCREENING FOR BLOOD OR PROTEIN IN URINE: ICD-10-CM

## 2018-07-31 DIAGNOSIS — O09.93 SUPERVISION OF HIGH RISK PREGNANCY IN THIRD TRIMESTER: Primary | ICD-10-CM

## 2018-07-31 DIAGNOSIS — O10.919 CHRONIC HYPERTENSION AFFECTING PREGNANCY: ICD-10-CM

## 2018-07-31 DIAGNOSIS — Z28.39 MATERNAL VARICELLA, NON-IMMUNE: ICD-10-CM

## 2018-07-31 DIAGNOSIS — O99.013 ANEMIA AFFECTING PREGNANCY IN THIRD TRIMESTER: ICD-10-CM

## 2018-07-31 DIAGNOSIS — O09.899 MATERNAL VARICELLA, NON-IMMUNE: ICD-10-CM

## 2018-07-31 DIAGNOSIS — O99.212 OBESITY AFFECTING PREGNANCY IN SECOND TRIMESTER: ICD-10-CM

## 2018-07-31 LAB
BILIRUB BLD-MCNC: NEGATIVE MG/DL
GLUCOSE UR STRIP-MCNC: NEGATIVE MG/DL
KETONES UR QL: ABNORMAL
LEUKOCYTE EST, POC: NEGATIVE
NITRITE UR-MCNC: NEGATIVE MG/ML
PH UR: 6.5 [PH] (ref 5–8)
PROT UR STRIP-MCNC: NEGATIVE MG/DL
RBC # UR STRIP: NEGATIVE /UL
SP GR UR: 1.03 (ref 1–1.03)
UROBILINOGEN UR QL: NORMAL

## 2018-07-31 PROCEDURE — 99213 OFFICE O/P EST LOW 20 MIN: CPT | Performed by: OBSTETRICS & GYNECOLOGY

## 2018-07-31 RX ORDER — FERROUS SULFATE 325(65) MG
325 TABLET ORAL
Qty: 30 TABLET | Refills: 6 | Status: SHIPPED | OUTPATIENT
Start: 2018-07-31 | End: 2018-09-09 | Stop reason: HOSPADM

## 2018-07-31 NOTE — TELEPHONE ENCOUNTER
File Link     Scan on 7/21/2018 : PA REQEUST FERRALET 7/21/18 FORD   Key Information     Document ID File Type Document Type   [id number] Image MEDICATIONS - SCAN   Description: PA REQEUST FERRALET 7/21/18 FORD   Import Information     Attached At Date Time User   Encounter Level 7/21/2018     Encounter     Scanned Document on 7/21/18 with Generic Waqas Rivers MD         Progress notes     No notes of this type exist for this encounter.

## 2018-08-06 ENCOUNTER — PROCEDURE VISIT (OUTPATIENT)
Dept: OBSTETRICS AND GYNECOLOGY | Age: 23
End: 2018-08-06

## 2018-08-06 DIAGNOSIS — Z36.89 ULTRASOUND SCAN TO CHECK INTERVAL GROWTH OF FETUS: Primary | ICD-10-CM

## 2018-08-06 DIAGNOSIS — O10.919 CHRONIC HYPERTENSION AFFECTING PREGNANCY: ICD-10-CM

## 2018-08-06 DIAGNOSIS — O99.212 OBESITY AFFECTING PREGNANCY IN SECOND TRIMESTER: ICD-10-CM

## 2018-08-06 PROCEDURE — 76816 OB US FOLLOW-UP PER FETUS: CPT | Performed by: OBSTETRICS & GYNECOLOGY

## 2018-08-06 NOTE — PROGRESS NOTES
Ultrasound Note     2018    Patient:  Rhina Marquez      MR#:[id number]    23 y.o.   for interval growth     Patient Active Problem List   Diagnosis   • Maternal varicella, non-immune   • Generalized convulsive seizures (CMS/HCC)   • Hx of preeclampsia, prior pregnancy, currently pregnant   • Obesity affecting pregnancy in second trimester   • Sciatica, left side   • Pregnancy   • Chronic hypertension affecting pregnancy   • Chronic headaches   • High-risk pregnancy supervision   • Anemia affecting pregnancy       [See the scanned report in the media tab for more details]    Impression    1.  Intrauterine pregnancy at 30w2d  2.  Normal Interval Growth with EFW at 76 % and AC at 93%.    Relevant comparison data available:  [x]           Carlo Sanderson MD  2018 4:50 PM

## 2018-08-14 ENCOUNTER — ROUTINE PRENATAL (OUTPATIENT)
Dept: OBSTETRICS AND GYNECOLOGY | Age: 23
End: 2018-08-14

## 2018-08-14 VITALS — WEIGHT: 220 LBS | BODY MASS INDEX: 36.61 KG/M2 | SYSTOLIC BLOOD PRESSURE: 120 MMHG | DIASTOLIC BLOOD PRESSURE: 70 MMHG

## 2018-08-14 DIAGNOSIS — O99.013 ANEMIA AFFECTING PREGNANCY IN THIRD TRIMESTER: ICD-10-CM

## 2018-08-14 DIAGNOSIS — O09.299 HX OF PREECLAMPSIA, PRIOR PREGNANCY, CURRENTLY PREGNANT: ICD-10-CM

## 2018-08-14 DIAGNOSIS — O09.93 SUPERVISION OF HIGH RISK PREGNANCY IN THIRD TRIMESTER: ICD-10-CM

## 2018-08-14 DIAGNOSIS — Z13.89 SCREENING FOR BLOOD OR PROTEIN IN URINE: Primary | ICD-10-CM

## 2018-08-14 DIAGNOSIS — O09.899 MATERNAL VARICELLA, NON-IMMUNE: ICD-10-CM

## 2018-08-14 DIAGNOSIS — R56.9 GENERALIZED CONVULSIVE SEIZURES (HCC): ICD-10-CM

## 2018-08-14 DIAGNOSIS — O10.919 CHRONIC HYPERTENSION AFFECTING PREGNANCY: ICD-10-CM

## 2018-08-14 DIAGNOSIS — O99.212 OBESITY AFFECTING PREGNANCY IN SECOND TRIMESTER: ICD-10-CM

## 2018-08-14 DIAGNOSIS — Z28.39 MATERNAL VARICELLA, NON-IMMUNE: ICD-10-CM

## 2018-08-14 LAB
BILIRUB BLD-MCNC: NEGATIVE MG/DL
CLARITY, POC: CLEAR
COLOR UR: YELLOW
GLUCOSE UR STRIP-MCNC: NEGATIVE MG/DL
KETONES UR QL: ABNORMAL
LEUKOCYTE EST, POC: NEGATIVE
NITRITE UR-MCNC: NEGATIVE MG/ML
PH UR: 6 [PH] (ref 5–8)
PROT UR STRIP-MCNC: ABNORMAL MG/DL
RBC # UR STRIP: ABNORMAL /UL
SP GR UR: 1.03 (ref 1–1.03)
UROBILINOGEN UR QL: NORMAL

## 2018-08-14 PROCEDURE — 99213 OFFICE O/P EST LOW 20 MIN: CPT | Performed by: OBSTETRICS & GYNECOLOGY

## 2018-08-14 NOTE — PROGRESS NOTES
Chief Complaint   Patient presents with   • Routine Prenatal Visit     having dizziness, past week had abdominal pain almost felt like having contractions LEBATALOL 200 MG BID       HPI: 23 y.o.  at 31w3d presents for prenatal care - denies loss of fluid, vag bleeding, ctx +FM     Vitals:    18 1511   BP: 120/70   Weight: 99.8 kg (220 lb)       ROS:  GI:  Negative  : neg  Pulmonary: Negative     A/P  1. Intrauterine pregnancy at 31w3d   2. Pregnancy Risk:  HIGH RISK    Rhina was seen today for routine prenatal visit.    Diagnoses and all orders for this visit:    Screening for blood or protein in urine  -     POC Urinalysis Dipstick    Chronic hypertension affecting pregnancy    Obesity affecting pregnancy in second trimester    Generalized convulsive seizures (CMS/HCC)    Anemia affecting pregnancy in third trimester    Supervision of high risk pregnancy in third trimester    Maternal varicella, non-immune    Hx of preeclampsia, prior pregnancy, currently pregnant        -----------------------  PLAN:   Return in about 1 week (around 2018), or ob check and BPP .  CHTN - stable on Labetalol 200 mg BID   Growth wnl last week  Obesity - 3 lb weight loss  Anemia - cont Iron  PTL warnings     Discussed trying labetalol 100 mg bID to keep BP from dropping too low  Anali Del Angel MD  2018 3:34 PM

## 2018-08-21 ENCOUNTER — ROUTINE PRENATAL (OUTPATIENT)
Dept: OBSTETRICS AND GYNECOLOGY | Age: 23
End: 2018-08-21

## 2018-08-21 ENCOUNTER — PROCEDURE VISIT (OUTPATIENT)
Dept: OBSTETRICS AND GYNECOLOGY | Age: 23
End: 2018-08-21

## 2018-08-21 VITALS — SYSTOLIC BLOOD PRESSURE: 120 MMHG | DIASTOLIC BLOOD PRESSURE: 72 MMHG | WEIGHT: 223 LBS | BODY MASS INDEX: 37.11 KG/M2

## 2018-08-21 DIAGNOSIS — O99.013 ANEMIA AFFECTING PREGNANCY IN THIRD TRIMESTER: ICD-10-CM

## 2018-08-21 DIAGNOSIS — O09.899 MATERNAL VARICELLA, NON-IMMUNE: ICD-10-CM

## 2018-08-21 DIAGNOSIS — Z13.89 SCREENING FOR BLOOD OR PROTEIN IN URINE: ICD-10-CM

## 2018-08-21 DIAGNOSIS — O21.9 NAUSEA AND VOMITING IN PREGNANCY: ICD-10-CM

## 2018-08-21 DIAGNOSIS — O99.212 OBESITY AFFECTING PREGNANCY IN SECOND TRIMESTER: ICD-10-CM

## 2018-08-21 DIAGNOSIS — O09.299 HX OF PREECLAMPSIA, PRIOR PREGNANCY, CURRENTLY PREGNANT: ICD-10-CM

## 2018-08-21 DIAGNOSIS — O10.919 CHRONIC HYPERTENSION AFFECTING PREGNANCY: Primary | ICD-10-CM

## 2018-08-21 DIAGNOSIS — O09.93 SUPERVISION OF HIGH RISK PREGNANCY IN THIRD TRIMESTER: Primary | ICD-10-CM

## 2018-08-21 DIAGNOSIS — R10.13 CHRONIC EPIGASTRIC PAIN: ICD-10-CM

## 2018-08-21 DIAGNOSIS — G89.29 CHRONIC EPIGASTRIC PAIN: ICD-10-CM

## 2018-08-21 DIAGNOSIS — Z28.39 MATERNAL VARICELLA, NON-IMMUNE: ICD-10-CM

## 2018-08-21 DIAGNOSIS — O10.919 CHRONIC HYPERTENSION AFFECTING PREGNANCY: ICD-10-CM

## 2018-08-21 LAB
BILIRUB BLD-MCNC: NEGATIVE MG/DL
BILIRUB BLD-MCNC: NEGATIVE MG/DL
CLARITY, POC: CLEAR
COLOR UR: YELLOW
GLUCOSE UR STRIP-MCNC: NEGATIVE MG/DL
GLUCOSE UR STRIP-MCNC: NEGATIVE MG/DL
KETONES UR QL: NEGATIVE
KETONES UR QL: NEGATIVE
LEUKOCYTE EST, POC: NEGATIVE
LEUKOCYTE EST, POC: NEGATIVE
NITRITE UR-MCNC: NEGATIVE MG/ML
NITRITE UR-MCNC: NEGATIVE MG/ML
PH UR: 6 [PH] (ref 5–8)
PH UR: 6 [PH] (ref 5–8)
PROT UR STRIP-MCNC: ABNORMAL MG/DL
PROT UR STRIP-MCNC: ABNORMAL MG/DL
RBC # UR STRIP: NEGATIVE /UL
RBC # UR STRIP: NEGATIVE /UL
SP GR UR: 1.03 (ref 1–1.03)
SP GR UR: 1.03 (ref 1–1.03)
UROBILINOGEN UR QL: NORMAL
UROBILINOGEN UR QL: NORMAL

## 2018-08-21 PROCEDURE — 76819 FETAL BIOPHYS PROFIL W/O NST: CPT | Performed by: OBSTETRICS & GYNECOLOGY

## 2018-08-21 PROCEDURE — 99213 OFFICE O/P EST LOW 20 MIN: CPT | Performed by: OBSTETRICS & GYNECOLOGY

## 2018-08-21 NOTE — PROGRESS NOTES
Chief Complaint   Patient presents with   • Routine Prenatal Visit     labetalol 200bid,  no c/o       HPI: 23 y.o.  at 32w3d presents for prenatal care - denies loss of fluid, vag bleeding or ctx +FM     Vitals:    18 0922   BP: 120/72   Weight: 101 kg (223 lb)       ROS:  GI:  Negative  : neg  Pulmonary: Negative     A/P  1. Intrauterine pregnancy at 32w3d   2. Pregnancy Risk:  HIGH RISK    Rhina was seen today for routine prenatal visit.    Diagnoses and all orders for this visit:    Supervision of high risk pregnancy in third trimester    Screening for blood or protein in urine  -     POC Urinalysis Dipstick  -     POC Urinalysis Dipstick    Obesity affecting pregnancy in second trimester    Chronic hypertension affecting pregnancy    Anemia affecting pregnancy in third trimester    Maternal varicella, non-immune    Hx of preeclampsia, prior pregnancy, currently pregnant    Nausea and vomiting in pregnancy  -     US Gallbladder; Future    Chronic epigastric pain  -     US Gallbladder; Future        -----------------------  PLAN:   Return in about 1 week (around 2018), or ob check and BPP .  CHTN - cont labetalol 200 BID -BP stable   Anemia - cont Iron   BPP today    Cont weekly BPP      Anali Del Angel MD  2018 10:22 AM

## 2018-08-22 ENCOUNTER — HOSPITAL ENCOUNTER (OUTPATIENT)
Dept: ULTRASOUND IMAGING | Facility: HOSPITAL | Age: 23
Discharge: HOME | End: 2018-08-22
Attending: OBSTETRICS & GYNECOLOGY | Admitting: OBSTETRICS & GYNECOLOGY

## 2018-08-22 DIAGNOSIS — O21.9 NAUSEA AND VOMITING IN PREGNANCY: ICD-10-CM

## 2018-08-22 DIAGNOSIS — G89.29 CHRONIC EPIGASTRIC PAIN: ICD-10-CM

## 2018-08-22 DIAGNOSIS — R10.13 CHRONIC EPIGASTRIC PAIN: ICD-10-CM

## 2018-08-22 PROCEDURE — 76705 ECHO EXAM OF ABDOMEN: CPT

## 2018-08-23 ENCOUNTER — TELEPHONE (OUTPATIENT)
Dept: OBSTETRICS AND GYNECOLOGY | Age: 23
End: 2018-08-23

## 2018-08-23 NOTE — TELEPHONE ENCOUNTER
----- Message from Anali Del Angel MD sent at 8/23/2018  7:18 AM EDT -----  Please call patient and notify of normal results of gallbladder ultrasound

## 2018-08-28 ENCOUNTER — PROCEDURE VISIT (OUTPATIENT)
Dept: OBSTETRICS AND GYNECOLOGY | Age: 23
End: 2018-08-28

## 2018-08-28 ENCOUNTER — ROUTINE PRENATAL (OUTPATIENT)
Dept: OBSTETRICS AND GYNECOLOGY | Age: 23
End: 2018-08-28

## 2018-08-28 VITALS — WEIGHT: 224 LBS | SYSTOLIC BLOOD PRESSURE: 120 MMHG | BODY MASS INDEX: 37.28 KG/M2 | DIASTOLIC BLOOD PRESSURE: 80 MMHG

## 2018-08-28 DIAGNOSIS — O09.93 SUPERVISION OF HIGH RISK PREGNANCY IN THIRD TRIMESTER: Primary | ICD-10-CM

## 2018-08-28 DIAGNOSIS — Z36.89 ULTRASOUND SCAN TO CHECK INTERVAL GROWTH OF FETUS: ICD-10-CM

## 2018-08-28 DIAGNOSIS — O10.919 CHRONIC HYPERTENSION DURING PREGNANCY: Primary | ICD-10-CM

## 2018-08-28 DIAGNOSIS — O36.63X0 EXCESSIVE FETAL GROWTH AFFECTING MANAGEMENT OF PREGNANCY IN THIRD TRIMESTER, SINGLE OR UNSPECIFIED FETUS: ICD-10-CM

## 2018-08-28 DIAGNOSIS — O99.013 ANEMIA AFFECTING PREGNANCY IN THIRD TRIMESTER: ICD-10-CM

## 2018-08-28 DIAGNOSIS — O99.212 OBESITY AFFECTING PREGNANCY IN SECOND TRIMESTER: ICD-10-CM

## 2018-08-28 DIAGNOSIS — Z28.39 MATERNAL VARICELLA, NON-IMMUNE: ICD-10-CM

## 2018-08-28 DIAGNOSIS — O10.919 CHRONIC HYPERTENSION AFFECTING PREGNANCY: ICD-10-CM

## 2018-08-28 DIAGNOSIS — Z13.89 SCREENING FOR BLOOD OR PROTEIN IN URINE: ICD-10-CM

## 2018-08-28 DIAGNOSIS — O09.299 HX OF PREECLAMPSIA, PRIOR PREGNANCY, CURRENTLY PREGNANT: ICD-10-CM

## 2018-08-28 DIAGNOSIS — O09.899 MATERNAL VARICELLA, NON-IMMUNE: ICD-10-CM

## 2018-08-28 LAB
BILIRUB BLD-MCNC: ABNORMAL MG/DL
GLUCOSE UR STRIP-MCNC: NEGATIVE MG/DL
KETONES UR QL: ABNORMAL
LEUKOCYTE EST, POC: NEGATIVE
NITRITE UR-MCNC: NEGATIVE MG/ML
PH UR: 6 [PH] (ref 5–8)
PROT UR STRIP-MCNC: ABNORMAL MG/DL
RBC # UR STRIP: NEGATIVE /UL
SP GR UR: 1.03 (ref 1–1.03)
UROBILINOGEN UR QL: NORMAL

## 2018-08-28 PROCEDURE — 99213 OFFICE O/P EST LOW 20 MIN: CPT | Performed by: OBSTETRICS & GYNECOLOGY

## 2018-08-28 PROCEDURE — 76819 FETAL BIOPHYS PROFIL W/O NST: CPT | Performed by: OBSTETRICS & GYNECOLOGY

## 2018-08-28 PROCEDURE — 76816 OB US FOLLOW-UP PER FETUS: CPT | Performed by: OBSTETRICS & GYNECOLOGY

## 2018-08-28 NOTE — PROGRESS NOTES
"Chief Complaint   Patient presents with   • Routine Prenatal Visit     feeling hot today , has a feeling  like \"going pass out \" .  Temp 98.4, Repeat /82.       HPI: 23 y.o.  at 33w3d presents for prenatal care - denies loss of fluid, vag bleeding, ctx +FM  Vitals:    18 1517   BP: 120/80   Weight: 102 kg (224 lb)       ROS:  GI:  Negative  : neg  Pulmonary: Negative     A/P  1. Intrauterine pregnancy at 33w3d   2. Pregnancy Risk:  HIGH RISK    Rhina was seen today for routine prenatal visit.    Diagnoses and all orders for this visit:    Supervision of high risk pregnancy in third trimester    Screening for blood or protein in urine  -     POC Urinalysis Dipstick    Chronic hypertension affecting pregnancy    Obesity affecting pregnancy in second trimester    Anemia affecting pregnancy in third trimester    Maternal varicella, non-immune    Hx of preeclampsia, prior pregnancy, currently pregnant    Excessive fetal growth affecting management of pregnancy in third trimester, single or unspecified fetus        -----------------------  PLAN:   Return in about 1 week (around 2018), or ob check and BPP.  PTL warnings   CHTN - cont labetalol 200 mg BID   Mild dehydration - enc increased H20 intake  LGA - repeat growth 3-4 weeks   Weekly bpps   PTL warnings       Anali Del Angel MD  2018 3:44 PM    "

## 2018-09-06 ENCOUNTER — ROUTINE PRENATAL (OUTPATIENT)
Dept: OBSTETRICS AND GYNECOLOGY | Age: 23
End: 2018-09-06

## 2018-09-06 ENCOUNTER — PROCEDURE VISIT (OUTPATIENT)
Dept: OBSTETRICS AND GYNECOLOGY | Age: 23
End: 2018-09-06

## 2018-09-06 VITALS — SYSTOLIC BLOOD PRESSURE: 120 MMHG | BODY MASS INDEX: 37.28 KG/M2 | DIASTOLIC BLOOD PRESSURE: 80 MMHG | WEIGHT: 224 LBS

## 2018-09-06 DIAGNOSIS — Z28.39 MATERNAL VARICELLA, NON-IMMUNE: ICD-10-CM

## 2018-09-06 DIAGNOSIS — O10.919 CHRONIC HYPERTENSION AFFECTING PREGNANCY: ICD-10-CM

## 2018-09-06 DIAGNOSIS — Z13.89 SCREENING FOR BLOOD OR PROTEIN IN URINE: ICD-10-CM

## 2018-09-06 DIAGNOSIS — O36.63X0 EXCESSIVE FETAL GROWTH AFFECTING MANAGEMENT OF PREGNANCY IN THIRD TRIMESTER, SINGLE OR UNSPECIFIED FETUS: ICD-10-CM

## 2018-09-06 DIAGNOSIS — O09.899 MATERNAL VARICELLA, NON-IMMUNE: ICD-10-CM

## 2018-09-06 DIAGNOSIS — O09.93 SUPERVISION OF HIGH RISK PREGNANCY IN THIRD TRIMESTER: Primary | ICD-10-CM

## 2018-09-06 DIAGNOSIS — O10.919 CHRONIC HYPERTENSION AFFECTING PREGNANCY: Primary | ICD-10-CM

## 2018-09-06 DIAGNOSIS — O09.299 HX OF PREECLAMPSIA, PRIOR PREGNANCY, CURRENTLY PREGNANT: ICD-10-CM

## 2018-09-06 DIAGNOSIS — O99.213 OBESITY AFFECTING PREGNANCY IN THIRD TRIMESTER: ICD-10-CM

## 2018-09-06 DIAGNOSIS — O99.212 OBESITY AFFECTING PREGNANCY IN SECOND TRIMESTER: ICD-10-CM

## 2018-09-06 DIAGNOSIS — O99.013 ANEMIA AFFECTING PREGNANCY IN THIRD TRIMESTER: ICD-10-CM

## 2018-09-06 DIAGNOSIS — R56.9 GENERALIZED CONVULSIVE SEIZURES (HCC): ICD-10-CM

## 2018-09-06 LAB
BILIRUB BLD-MCNC: NEGATIVE MG/DL
GLUCOSE UR STRIP-MCNC: NEGATIVE MG/DL
KETONES UR QL: NEGATIVE
LEUKOCYTE EST, POC: NEGATIVE
NITRITE UR-MCNC: NEGATIVE MG/ML
PH UR: 6.5 [PH] (ref 5–8)
PROT UR STRIP-MCNC: ABNORMAL MG/DL
RBC # UR STRIP: NEGATIVE /UL
SP GR UR: 1.02 (ref 1–1.03)
UROBILINOGEN UR QL: NORMAL

## 2018-09-06 PROCEDURE — 76819 FETAL BIOPHYS PROFIL W/O NST: CPT | Performed by: OBSTETRICS & GYNECOLOGY

## 2018-09-06 PROCEDURE — 99213 OFFICE O/P EST LOW 20 MIN: CPT | Performed by: OBSTETRICS & GYNECOLOGY

## 2018-09-06 NOTE — PROGRESS NOTES
Chief Complaint   Patient presents with   • Routine Prenatal Visit     c/o congestion, labetalol 200mg bid       HPI: 23 y.o.  at 34w5d presents for prenatal care - denies loss of fluid, vag bleeding, ctx +FM     Vitals:    18 1356   BP: 120/80   Weight: 102 kg (224 lb)       ROS:  GI:  Negative  : neg  Pulmonary: Negative     A/P  1. Intrauterine pregnancy at 34w5d   2. Pregnancy Risk:  HIGH RISK    Rhina was seen today for routine prenatal visit.    Diagnoses and all orders for this visit:    Supervision of high risk pregnancy in third trimester    Screening for blood or protein in urine  -     POC Urinalysis Dipstick    Chronic hypertension affecting pregnancy    Obesity affecting pregnancy in second trimester    Generalized convulsive seizures (CMS/HCC)    Anemia affecting pregnancy in third trimester    Excessive fetal growth affecting management of pregnancy in third trimester, single or unspecified fetus    Maternal varicella, non-immune    Hx of preeclampsia, prior pregnancy, currently pregnant        -----------------------  PLAN:   Return in about 1 week (around 2018), or ob check and BPP.  CHTN - stable on Labetalol 200 mg bID   BPP today 8 - repeat next week   PTL warnings   PreE warnings   LGA - repeat growth 2-3 weeks       Anali Del Angel MD  2018 2:41 PM

## 2018-09-07 ENCOUNTER — ANESTHESIA (OUTPATIENT)
Dept: LABOR AND DELIVERY | Facility: HOSPITAL | Age: 23
End: 2018-09-07

## 2018-09-07 ENCOUNTER — HOSPITAL ENCOUNTER (INPATIENT)
Facility: HOSPITAL | Age: 23
LOS: 2 days | Discharge: HOME | End: 2018-09-09
Attending: OBSTETRICS & GYNECOLOGY | Admitting: OBSTETRICS & GYNECOLOGY

## 2018-09-07 ENCOUNTER — ANESTHESIA EVENT (OUTPATIENT)
Dept: LABOR AND DELIVERY | Facility: HOSPITAL | Age: 23
End: 2018-09-07

## 2018-09-07 DIAGNOSIS — Z3A.34 34 WEEKS GESTATION OF PREGNANCY: Primary | ICD-10-CM

## 2018-09-07 DIAGNOSIS — O09.899 MATERNAL VARICELLA, NON-IMMUNE: ICD-10-CM

## 2018-09-07 DIAGNOSIS — Z28.39 MATERNAL VARICELLA, NON-IMMUNE: ICD-10-CM

## 2018-09-07 PROBLEM — O60.03 PRETERM LABOR IN THIRD TRIMESTER WITHOUT DELIVERY: Status: ACTIVE | Noted: 2018-09-07

## 2018-09-07 PROBLEM — Z34.90 PREGNANT: Status: ACTIVE | Noted: 2018-09-07

## 2018-09-07 PROBLEM — O16.3 ELEVATED BLOOD PRESSURE COMPLICATING PREGNANCY, ANTEPARTUM, THIRD TRIMESTER: Status: ACTIVE | Noted: 2018-09-07

## 2018-09-07 LAB
ABO GROUP BLD: NORMAL
ALBUMIN SERPL-MCNC: 3.6 G/DL (ref 3.5–5.2)
ALBUMIN/GLOB SERPL: 1.2 G/DL
ALP SERPL-CCNC: 120 U/L (ref 39–117)
ALT SERPL W P-5'-P-CCNC: 16 U/L (ref 1–33)
ANION GAP SERPL CALCULATED.3IONS-SCNC: 13.4 MMOL/L
AST SERPL-CCNC: 15 U/L (ref 1–32)
BASOPHILS # BLD AUTO: 0.02 10*3/MM3 (ref 0–0.2)
BASOPHILS NFR BLD AUTO: 0.1 % (ref 0–1.5)
BILIRUB SERPL-MCNC: 0.3 MG/DL (ref 0.1–1.2)
BLD GP AB SCN SERPL QL: NEGATIVE
BUN BLD-MCNC: 6 MG/DL (ref 6–20)
BUN/CREAT SERPL: 11.5 (ref 7–25)
CALCIUM SPEC-SCNC: 8.9 MG/DL (ref 8.6–10.5)
CHLORIDE SERPL-SCNC: 104 MMOL/L (ref 98–107)
CO2 SERPL-SCNC: 19.6 MMOL/L (ref 22–29)
CREAT BLD-MCNC: 0.52 MG/DL (ref 0.57–1)
CREAT BLD-MCNC: 0.52 MG/DL (ref 0.57–1)
DEPRECATED RDW RBC AUTO: 40.3 FL (ref 37–54)
EOSINOPHIL # BLD AUTO: 0.1 10*3/MM3 (ref 0–0.7)
EOSINOPHIL NFR BLD AUTO: 0.6 % (ref 0.3–6.2)
ERYTHROCYTE [DISTWIDTH] IN BLOOD BY AUTOMATED COUNT: 13.4 % (ref 11.7–13)
EXPIRATION DATE: NORMAL
GFR SERPL CREATININE-BSD FRML MDRD: 149 ML/MIN/1.73
GFR SERPL CREATININE-BSD FRML MDRD: 149 ML/MIN/1.73
GLOBULIN UR ELPH-MCNC: 2.9 GM/DL
GLUCOSE BLD-MCNC: 75 MG/DL (ref 65–99)
HCT VFR BLD AUTO: 34.6 % (ref 35.6–45.5)
HGB BLD-MCNC: 11 G/DL (ref 11.9–15.5)
IMM GRANULOCYTES # BLD: 0.12 10*3/MM3 (ref 0–0.03)
IMM GRANULOCYTES NFR BLD: 0.7 % (ref 0–0.5)
LYMPHOCYTES # BLD AUTO: 1.88 10*3/MM3 (ref 0.9–4.8)
LYMPHOCYTES NFR BLD AUTO: 11.7 % (ref 19.6–45.3)
Lab: NORMAL
MCH RBC QN AUTO: 26.5 PG (ref 26.9–32)
MCHC RBC AUTO-ENTMCNC: 31.8 G/DL (ref 32.4–36.3)
MCV RBC AUTO: 83.4 FL (ref 80.5–98.2)
MONOCYTES # BLD AUTO: 0.98 10*3/MM3 (ref 0.2–1.2)
MONOCYTES NFR BLD AUTO: 6.1 % (ref 5–12)
NEUTROPHILS # BLD AUTO: 13.06 10*3/MM3 (ref 1.9–8.1)
NEUTROPHILS NFR BLD AUTO: 81.5 % (ref 42.7–76)
PLATELET # BLD AUTO: 252 10*3/MM3 (ref 140–500)
PMV BLD AUTO: 10.7 FL (ref 6–12)
POTASSIUM BLD-SCNC: 3.8 MMOL/L (ref 3.5–5.2)
PROT SERPL-MCNC: 6.5 G/DL (ref 6–8.5)
PROT UR STRIP-MCNC: NEGATIVE MG/DL
RBC # BLD AUTO: 4.15 10*6/MM3 (ref 3.9–5.2)
RH BLD: POSITIVE
SODIUM BLD-SCNC: 137 MMOL/L (ref 136–145)
T&S EXPIRATION DATE: NORMAL
WBC NRBC COR # BLD: 16.04 10*3/MM3 (ref 4.5–10.7)

## 2018-09-07 PROCEDURE — 82565 ASSAY OF CREATININE: CPT | Performed by: OBSTETRICS & GYNECOLOGY

## 2018-09-07 PROCEDURE — 25010000002 BETAMETHASONE ACET & SOD PHOS PER 4 MG: Performed by: OBSTETRICS & GYNECOLOGY

## 2018-09-07 PROCEDURE — 81002 URINALYSIS NONAUTO W/O SCOPE: CPT | Performed by: OBSTETRICS & GYNECOLOGY

## 2018-09-07 PROCEDURE — C1755 CATHETER, INTRASPINAL: HCPCS | Performed by: ANESTHESIOLOGY

## 2018-09-07 PROCEDURE — 85025 COMPLETE CBC W/AUTO DIFF WBC: CPT | Performed by: OBSTETRICS & GYNECOLOGY

## 2018-09-07 PROCEDURE — 88307 TISSUE EXAM BY PATHOLOGIST: CPT

## 2018-09-07 PROCEDURE — 25010000002 PENICILLIN G POTASSIUM PER 600000 UNITS: Performed by: OBSTETRICS & GYNECOLOGY

## 2018-09-07 PROCEDURE — 86850 RBC ANTIBODY SCREEN: CPT | Performed by: OBSTETRICS & GYNECOLOGY

## 2018-09-07 PROCEDURE — 63710000001 DIPHENHYDRAMINE PER 50 MG: Performed by: ANESTHESIOLOGY

## 2018-09-07 PROCEDURE — 86900 BLOOD TYPING SEROLOGIC ABO: CPT | Performed by: OBSTETRICS & GYNECOLOGY

## 2018-09-07 PROCEDURE — 3E03329 INTRODUCTION OF OTHER ANTI-INFECTIVE INTO PERIPHERAL VEIN, PERCUTANEOUS APPROACH: ICD-10-PCS | Performed by: OBSTETRICS & GYNECOLOGY

## 2018-09-07 PROCEDURE — 86901 BLOOD TYPING SEROLOGIC RH(D): CPT | Performed by: OBSTETRICS & GYNECOLOGY

## 2018-09-07 PROCEDURE — 59410 OBSTETRICAL CARE: CPT | Performed by: OBSTETRICS & GYNECOLOGY

## 2018-09-07 PROCEDURE — 0KQM0ZZ REPAIR PERINEUM MUSCLE, OPEN APPROACH: ICD-10-PCS | Performed by: OBSTETRICS & GYNECOLOGY

## 2018-09-07 PROCEDURE — 80053 COMPREHEN METABOLIC PANEL: CPT | Performed by: OBSTETRICS & GYNECOLOGY

## 2018-09-07 RX ORDER — LABETALOL 200 MG/1
200 TABLET, FILM COATED ORAL EVERY 12 HOURS SCHEDULED
Status: DISCONTINUED | OUTPATIENT
Start: 2018-09-07 | End: 2018-09-09 | Stop reason: HOSPADM

## 2018-09-07 RX ORDER — ACETAMINOPHEN 325 MG/1
650 TABLET ORAL EVERY 4 HOURS PRN
Status: DISCONTINUED | OUTPATIENT
Start: 2018-09-07 | End: 2018-09-07

## 2018-09-07 RX ORDER — PENICILLIN G 3000000 [IU]/50ML
3 INJECTION, SOLUTION INTRAVENOUS EVERY 4 HOURS
Status: DISCONTINUED | OUTPATIENT
Start: 2018-09-07 | End: 2018-09-07

## 2018-09-07 RX ORDER — OXYCODONE HYDROCHLORIDE AND ACETAMINOPHEN 5; 325 MG/1; MG/1
1 TABLET ORAL EVERY 4 HOURS PRN
Status: DISCONTINUED | OUTPATIENT
Start: 2018-09-07 | End: 2018-09-09 | Stop reason: HOSPADM

## 2018-09-07 RX ORDER — FAMOTIDINE 10 MG/ML
20 INJECTION, SOLUTION INTRAVENOUS ONCE AS NEEDED
Status: DISCONTINUED | OUTPATIENT
Start: 2018-09-07 | End: 2018-09-07

## 2018-09-07 RX ORDER — OXYTOCIN-SODIUM CHLORIDE 0.9% IV SOLN 30 UNIT/500ML 30-0.9/5 UT/ML-%
250 SOLUTION INTRAVENOUS CONTINUOUS
Status: DISPENSED | OUTPATIENT
Start: 2018-09-07 | End: 2018-09-07

## 2018-09-07 RX ORDER — MISOPROSTOL 200 UG/1
800 TABLET ORAL AS NEEDED
Status: DISCONTINUED | OUTPATIENT
Start: 2018-09-07 | End: 2018-09-07 | Stop reason: HOSPADM

## 2018-09-07 RX ORDER — PHYTONADIONE 1 MG/.5ML
INJECTION, EMULSION INTRAMUSCULAR; INTRAVENOUS; SUBCUTANEOUS
Status: DISPENSED
Start: 2018-09-07 | End: 2018-09-08

## 2018-09-07 RX ORDER — IBUPROFEN 600 MG/1
600 TABLET ORAL EVERY 8 HOURS PRN
Status: DISCONTINUED | OUTPATIENT
Start: 2018-09-07 | End: 2018-09-09 | Stop reason: HOSPADM

## 2018-09-07 RX ORDER — DIPHENHYDRAMINE HYDROCHLORIDE 50 MG/ML
12.5 INJECTION INTRAMUSCULAR; INTRAVENOUS EVERY 8 HOURS PRN
Status: DISCONTINUED | OUTPATIENT
Start: 2018-09-07 | End: 2018-09-07

## 2018-09-07 RX ORDER — ONDANSETRON 2 MG/ML
4 INJECTION INTRAMUSCULAR; INTRAVENOUS ONCE AS NEEDED
Status: DISCONTINUED | OUTPATIENT
Start: 2018-09-07 | End: 2018-09-07

## 2018-09-07 RX ORDER — SODIUM CHLORIDE, SODIUM LACTATE, POTASSIUM CHLORIDE, CALCIUM CHLORIDE 600; 310; 30; 20 MG/100ML; MG/100ML; MG/100ML; MG/100ML
125 INJECTION, SOLUTION INTRAVENOUS CONTINUOUS
Status: DISCONTINUED | OUTPATIENT
Start: 2018-09-07 | End: 2018-09-07

## 2018-09-07 RX ORDER — OXYTOCIN-SODIUM CHLORIDE 0.9% IV SOLN 30 UNIT/500ML 30-0.9/5 UT/ML-%
999 SOLUTION INTRAVENOUS ONCE
Status: COMPLETED | OUTPATIENT
Start: 2018-09-07 | End: 2018-09-07

## 2018-09-07 RX ORDER — ONDANSETRON 2 MG/ML
4 INJECTION INTRAMUSCULAR; INTRAVENOUS EVERY 6 HOURS PRN
Status: DISCONTINUED | OUTPATIENT
Start: 2018-09-07 | End: 2018-09-09 | Stop reason: HOSPADM

## 2018-09-07 RX ORDER — LABETALOL 200 MG/1
200 TABLET, FILM COATED ORAL EVERY 12 HOURS SCHEDULED
Status: DISCONTINUED | OUTPATIENT
Start: 2018-09-07 | End: 2018-09-07

## 2018-09-07 RX ORDER — METHYLERGONOVINE MALEATE 0.2 MG/ML
200 INJECTION INTRAVENOUS ONCE AS NEEDED
Status: DISCONTINUED | OUTPATIENT
Start: 2018-09-07 | End: 2018-09-07 | Stop reason: HOSPADM

## 2018-09-07 RX ORDER — ONDANSETRON 4 MG/1
4 TABLET, FILM COATED ORAL EVERY 6 HOURS PRN
Status: DISCONTINUED | OUTPATIENT
Start: 2018-09-07 | End: 2018-09-07

## 2018-09-07 RX ORDER — ONDANSETRON 2 MG/ML
4 INJECTION INTRAMUSCULAR; INTRAVENOUS EVERY 6 HOURS PRN
Status: DISCONTINUED | OUTPATIENT
Start: 2018-09-07 | End: 2018-09-07

## 2018-09-07 RX ORDER — CARBOPROST TROMETHAMINE 250 UG/ML
250 INJECTION, SOLUTION INTRAMUSCULAR AS NEEDED
Status: DISCONTINUED | OUTPATIENT
Start: 2018-09-07 | End: 2018-09-07 | Stop reason: HOSPADM

## 2018-09-07 RX ORDER — OXYTOCIN-SODIUM CHLORIDE 0.9% IV SOLN 30 UNIT/500ML 30-0.9/5 UT/ML-%
125 SOLUTION INTRAVENOUS CONTINUOUS PRN
Status: COMPLETED | OUTPATIENT
Start: 2018-09-07 | End: 2018-09-07

## 2018-09-07 RX ORDER — SODIUM CHLORIDE 0.9 % (FLUSH) 0.9 %
1-10 SYRINGE (ML) INJECTION AS NEEDED
Status: DISCONTINUED | OUTPATIENT
Start: 2018-09-07 | End: 2018-09-09 | Stop reason: HOSPADM

## 2018-09-07 RX ORDER — EPHEDRINE SULFATE 50 MG/ML
5 INJECTION, SOLUTION INTRAVENOUS AS NEEDED
Status: DISCONTINUED | OUTPATIENT
Start: 2018-09-07 | End: 2018-09-07

## 2018-09-07 RX ORDER — DOCUSATE SODIUM 100 MG/1
100 CAPSULE, LIQUID FILLED ORAL 2 TIMES DAILY
Status: DISCONTINUED | OUTPATIENT
Start: 2018-09-07 | End: 2018-09-09 | Stop reason: HOSPADM

## 2018-09-07 RX ORDER — OXYCODONE HYDROCHLORIDE AND ACETAMINOPHEN 5; 325 MG/1; MG/1
2 TABLET ORAL EVERY 4 HOURS PRN
Status: DISCONTINUED | OUTPATIENT
Start: 2018-09-07 | End: 2018-09-09 | Stop reason: HOSPADM

## 2018-09-07 RX ORDER — BISACODYL 10 MG
10 SUPPOSITORY, RECTAL RECTAL DAILY PRN
Status: DISCONTINUED | OUTPATIENT
Start: 2018-09-08 | End: 2018-09-09 | Stop reason: HOSPADM

## 2018-09-07 RX ORDER — SODIUM CHLORIDE 0.9 % (FLUSH) 0.9 %
1-10 SYRINGE (ML) INJECTION AS NEEDED
Status: DISCONTINUED | OUTPATIENT
Start: 2018-09-07 | End: 2018-09-07

## 2018-09-07 RX ORDER — MINERAL OIL
OIL (ML) MISCELLANEOUS ONCE
Status: DISCONTINUED | OUTPATIENT
Start: 2018-09-07 | End: 2018-09-07

## 2018-09-07 RX ORDER — ONDANSETRON 4 MG/1
4 TABLET, FILM COATED ORAL EVERY 8 HOURS PRN
Status: DISCONTINUED | OUTPATIENT
Start: 2018-09-07 | End: 2018-09-09 | Stop reason: HOSPADM

## 2018-09-07 RX ORDER — ONDANSETRON 4 MG/1
4 TABLET, ORALLY DISINTEGRATING ORAL EVERY 6 HOURS PRN
Status: DISCONTINUED | OUTPATIENT
Start: 2018-09-07 | End: 2018-09-07

## 2018-09-07 RX ORDER — ERYTHROMYCIN 5 MG/G
OINTMENT OPHTHALMIC
Status: DISPENSED
Start: 2018-09-07 | End: 2018-09-08

## 2018-09-07 RX ORDER — ZOLPIDEM TARTRATE 5 MG/1
5 TABLET ORAL NIGHTLY PRN
Status: DISCONTINUED | OUTPATIENT
Start: 2018-09-07 | End: 2018-09-09 | Stop reason: HOSPADM

## 2018-09-07 RX ORDER — BETAMETHASONE SODIUM PHOSPHATE AND BETAMETHASONE ACETATE 3; 3 MG/ML; MG/ML
12 INJECTION, SUSPENSION INTRA-ARTICULAR; INTRALESIONAL; INTRAMUSCULAR; SOFT TISSUE ONCE
Status: COMPLETED | OUTPATIENT
Start: 2018-09-07 | End: 2018-09-07

## 2018-09-07 RX ORDER — ALBUTEROL SULFATE 90 UG/1
2 AEROSOL, METERED RESPIRATORY (INHALATION) EVERY 6 HOURS PRN
Status: DISCONTINUED | OUTPATIENT
Start: 2018-09-07 | End: 2018-09-09 | Stop reason: HOSPADM

## 2018-09-07 RX ORDER — DIPHENHYDRAMINE HCL 25 MG
25 CAPSULE ORAL ONCE
Status: COMPLETED | OUTPATIENT
Start: 2018-09-07 | End: 2018-09-07

## 2018-09-07 RX ADMIN — OXYTOCIN 125 ML/HR: 10 INJECTION INTRAVENOUS at 19:13

## 2018-09-07 RX ADMIN — DIPHENHYDRAMINE HYDROCHLORIDE 25 MG: 25 CAPSULE ORAL at 19:46

## 2018-09-07 RX ADMIN — BETAMETHASONE SODIUM PHOSPHATE AND BETAMETHASONE ACETATE 12 MG: 3; 3 INJECTION, SUSPENSION INTRA-ARTICULAR; INTRALESIONAL; INTRAMUSCULAR at 15:59

## 2018-09-07 RX ADMIN — SODIUM CHLORIDE 5 MILLION UNITS: 900 INJECTION INTRAVENOUS at 15:26

## 2018-09-07 RX ADMIN — SODIUM CHLORIDE, POTASSIUM CHLORIDE, SODIUM LACTATE AND CALCIUM CHLORIDE 125 ML/HR: 600; 310; 30; 20 INJECTION, SOLUTION INTRAVENOUS at 16:30

## 2018-09-07 RX ADMIN — Medication 10 ML/HR: at 15:39

## 2018-09-07 RX ADMIN — ACETAMINOPHEN 650 MG: 325 TABLET, FILM COATED ORAL at 15:51

## 2018-09-07 RX ADMIN — OXYTOCIN 999 ML/HR: 10 INJECTION INTRAVENOUS at 17:54

## 2018-09-07 RX ADMIN — SODIUM CHLORIDE, POTASSIUM CHLORIDE, SODIUM LACTATE AND CALCIUM CHLORIDE 1000 ML: 600; 310; 30; 20 INJECTION, SOLUTION INTRAVENOUS at 15:26

## 2018-09-07 NOTE — NEONATAL DELIVERY NOTE
[facility]  Vaginal Delivery Note    Delivery     Delivery:       YOB: 2016    Time of Birth: 5:51 PM      Anesthesia:       Delivering clinician:      Forceps?   No   Vacuum? No    Shoulder dystocia present: No        Delivery narrative:  24 yo  at 34 6/7 wk gest admitted in active labor and progressed quickly to C/C/BBOW. Pregnancy complicated by chronic HTN. Epidural given for pain management. BMZ x1 dose given today. IV penicillin given for h/o GBS+ status with previous pregnancies and for prematurity. AROM with clear fluid when complete. Pushed with a few contractions for  of live viable male infant, OA position. Shoulders and body easily delivered. Bulb suctioned after delivery of clear fluid.  team present for delivery. Cord clamped at cut at 30 seconds. Infant placed under radiant linares with  NP in attendance. Baby required O2 in delivery room. Transferred to NICU on O2 in stable condition. Placenta SI/3VC, to pathology. Second degree MLL repaired with 3-0 monocryl. Injected 1% lidocaine for local anesthesia for repair as pt felt some sharp sensations with repair.    Infant    Findings: male  infant     Infant observations: Weight: 3162 g (6 lb 15.5 oz)   Length: 18.75  in  Observations/Comments:  scale 4      Apgars: 8   @ 1 minute /    9   @ 5 minutes   Infant Name:       Placenta, Cord, and Fluid    Placenta delivered     at         Cord:    present.   Nuchal Cord?  no   Cord blood obtained:      Cord gases obtained:       Cord gas results: Venous:  No results found for: PHCVEN    Arterial:  No results found for: PHCART     Repair    Episiotomy: Not recorded    Lacerations: Yes  Laceration Information  Laceration Repaired?   Perineal:   2nd degree   yes   Periurethral:         Labial:         Sulcus:         Vaginal:         Cervical:           Suture used for repair: 3-0 monocryl   Estimated Blood Loss:200             Complications    labor    Disposition  Mother to Mother Baby/Postpartum  in stable condition currently.  Baby to NICU  in stable condition currently.      Denise Lazcano MD  09/07/18  6:28 PM

## 2018-09-07 NOTE — ANESTHESIA PREPROCEDURE EVALUATION
Anesthesia Evaluation     Patient summary reviewed and Nursing notes reviewed   no history of anesthetic complications:    NPO Liquid Status: > 2 hours           Airway   Mallampati: II  TM distance: >3 FB  Neck ROM: full  Dental - normal exam     Pulmonary - normal exam   (+) asthma,   Cardiovascular - normal exam  Exercise tolerance: good (4-7 METS)    (+) hypertension,       Neuro/Psych  (+) seizures, headaches, numbness, psychiatric history,     GI/Hepatic/Renal/Endo    (+) obesity, morbid obesity,      Musculoskeletal (-) negative ROS    Abdominal  - normal exam    Bowel sounds: normal.   Substance History - negative use     OB/GYN    (+) Pregnant, Preeclampsia, pregnancy induced hypertension        Other                        Anesthesia Plan    ASA 3     epidural     Anesthetic plan, all risks, benefits, and alternatives have been provided, discussed and informed consent has been obtained with: patient.

## 2018-09-07 NOTE — PLAN OF CARE
Problem: Fall Risk,  (Adult,Obstetrics,Pediatric)  Goal: Identify Related Risk Factors and Signs and Symptoms  Outcome: Ongoing (interventions implemented as appropriate)   18   Fall Risk,  (Adult,Obstetrics,Pediatric)   Related Risk Factors (Fall Risk, ) medication side effects;obesity   Signs and Symptoms (Fall Risk, ) presence of fall risk factors     Goal: Absence of Maternal Fall  Outcome: Ongoing (interventions implemented as appropriate)   18   Fall Risk,  (Adult,Obstetrics,Pediatric)   Absence of Maternal Fall making progress toward outcome       Problem: Skin Injury Risk (Adult)  Goal: Identify Related Risk Factors and Signs and Symptoms  Outcome: Ongoing (interventions implemented as appropriate)   18   Skin Injury Risk (Adult)   Related Risk Factors (Skin Injury Risk) mobility impaired;medication     Goal: Skin Health and Integrity  Outcome: Ongoing (interventions implemented as appropriate)   18   Skin Injury Risk (Adult)   Skin Health and Integrity making progress toward outcome       Problem: Anesthesia/Analgesia, Neuraxial (Obstetrics)  Goal: Signs and Symptoms of Listed Potential Problems Will be Absent, Minimized or Managed (Anesthesia/Analgesia, Neuraxial)  Outcome: Ongoing (interventions implemented as appropriate)   18   Goal/Outcome Evaluation   Problems Assessed (Neuraxial Anesthesia/Analgesia, OB) all   Problems Present (Neuraxial Anesth OB) none

## 2018-09-07 NOTE — PLAN OF CARE
Problem: Patient Care Overview  Goal: Plan of Care Review  Outcome: Ongoing (interventions implemented as appropriate)   18 190   Coping/Psychosocial   Plan of Care Reviewed With patient;spouse   Plan of Care Review   Progress improving   OTHER   Outcome Summary  of viable baby boy. Infant transferred to NICU d/t gest age and resp distress. Pt denies pain at this time. Fundus firm, at umbilicus, midline, with scant amt of bleeding. Will transfer to postpartum when discharge criteria met     Goal: Individualization and Mutuality  Outcome: Ongoing (interventions implemented as appropriate)   18 190   Individualization   Patient Specific Preferences seeing baby ASAP in NICU   Patient Specific Goals (Include Timeframe) Adequate pain control with laceration   Patient Specific Interventions see baby in NICU ASAP   Mutuality/Individual Preferences   What Anxieties, Fears, Concerns, or Questions Do You Have About Your Care? NICU admission and length of stay   What Information Would Help Us Give You More Personalized Care? Baby in NICU   How Would You and/or Your Support Person Like to Participate in Your Care? Remain at bedside and active in care   Mutuality/Individual Preferences   How to Address Anxieties/Fears education       Problem: Fall Risk,  (Adult,Obstetrics,Pediatric)  Goal: Identify Related Risk Factors and Signs and Symptoms  Outcome: Ongoing (interventions implemented as appropriate)   18 1715   Fall Risk,  (Adult,Obstetrics,Pediatric)   Related Risk Factors (Fall Risk, ) medication side effects;obesity   Signs and Symptoms (Fall Risk, ) presence of fall risk factors     Goal: Absence of Maternal Fall  Outcome: Ongoing (interventions implemented as appropriate)   18 1715   Fall Risk,  (Adult,Obstetrics,Pediatric)   Absence of Maternal Fall making progress toward outcome       Problem: Skin Injury Risk (Adult)  Goal: Identify Related Risk  Factors and Signs and Symptoms  Outcome: Ongoing (interventions implemented as appropriate)   09/07/18 1715   Skin Injury Risk (Adult)   Related Risk Factors (Skin Injury Risk) mobility impaired;medication     Goal: Skin Health and Integrity  Outcome: Ongoing (interventions implemented as appropriate)   09/07/18 1906   Skin Injury Risk (Adult)   Skin Health and Integrity making progress toward outcome       Problem: Anesthesia/Analgesia, Neuraxial (Obstetrics)  Goal: Signs and Symptoms of Listed Potential Problems Will be Absent, Minimized or Managed (Anesthesia/Analgesia, Neuraxial)  Outcome: Outcome(s) achieved Date Met: 09/07/18 09/07/18 1906   Goal/Outcome Evaluation   Problems Assessed (Neuraxial Anesthesia/Analgesia, OB) all   Problems Present (Neuraxial Anesth OB) none

## 2018-09-07 NOTE — PLAN OF CARE
Problem: Patient Care Overview  Goal: Plan of Care Review  Outcome: Ongoing (interventions implemented as appropriate)   18 1610   Coping/Psychosocial   Plan of Care Reviewed With patient   Plan of Care Review   Progress improving   OTHER   Outcome Summary  at 34+6wks arrives in triage with c/o ctx since 2300 last night. Pt changed cervix over 2 hrs while in triage so pt was admitted to labor and deliver.y. Pt recieved epidural at this time.      Goal: Individualization and Mutuality  Outcome: Ongoing (interventions implemented as appropriate)      Problem: Labor (Cervical Ripen, Induct, Augment) (Adult,Obstetrics,Pediatric)  Goal: Signs and Symptoms of Listed Potential Problems Will be Absent, Minimized or Managed (Labor)  Outcome: Ongoing (interventions implemented as appropriate)   18 1610   Goal/Outcome Evaluation   Problems Assessed (Labor) all   Problems Present (Labor) none

## 2018-09-07 NOTE — CONSULTS
Prenatal Consult    Referring OB:  Neno      Reason for Consultation: potential premature single at 34 weeks gestation    Maternal History:   First last name is a 23 y.o. yr/o  with:  labor.  The  EDC is  Estimated Date of Delivery: Estimated Date of Delivery: 10/13/18    Consult:  maternal grandfather and mother available for discussion.  We reviewed the fetal and  aspects of the above conditions to include: respiratory distress syndrome, beneficial effects of steroids, early onset of sepsis, apnea of prematurity, anemia of prematurity, feeding difficulty, temperature instability, jaundice and hypoglycemia    Estimated length of stay: MICHAEL  Discussed the importance of providing human milk for pre-term and late pre-term infants: yes  Reviewed policies and procedures for NICU/ICN  Reviewed structure and function of ULP-Neonatology     We will plan to attend delivery when requested.    Plan for  resuscitation: full resuscitation    Additional comments: mother received BMZ x1. GBS positive in previous pregnancy, PCN given x1. Mother presented in active labor. Mother plans to bottle feed.    Thank you for allowing us to participate in the care of your patient.     Cande Ludwig, JOAN  18  7:26 PM      30 minutes were spent in consultation, greater than 50% face to face time.

## 2018-09-07 NOTE — ANESTHESIA PROCEDURE NOTES
Labor Epidural    Patient location during procedure: OB  Indication:at surgeon's request  Performed By  Anesthesiologist: MICHELLE HECTOR  Preanesthetic Checklist  Completed: patient identified, site marked, surgical consent, pre-op evaluation, timeout performed, IV checked, risks and benefits discussed and monitors and equipment checked  Prep:  Pt Position:sitting  Sterile Tech:cap, gloves, mask and sterile barrier  Prep:chlorhexidine gluconate and isopropyl alcohol  Monitoring:blood pressure monitoring, continuous pulse oximetry and EKG  Epidural Block Procedure:  Approach:midline  Guidance:landmark technique  Location:L4-L5  Needle Type:Tuohy  Needle Gauge:17  Loss of Resistance Medium: saline  Loss of Resistance: 8cm  Cath Depth at skin:13 cm  Paresthesia: none  Aspiration:negative  Test Dose:negative  Number of Attempts: 1  Post Assessment:  Dressing:occlusive dressing applied and secured with tape  Pt Tolerance:patient tolerated the procedure well with no apparent complications  Complications:no

## 2018-09-07 NOTE — H&P
[facility]  Obstetric History and Physical    Chief Complaint   Patient presents with   • Contractions      at 34+6 wks arrives in triage with c/o ctxs since 2300 last night. Ctxs 5-7 mins currently. Abdomen palpates soft. +FM and denies LOF, VB.        Subjective     Patient is a 23 y.o. female  currently at 34w6d, who presents with painful regular uterine contractions. Progressed quickly through active labor. Comfortable with epidural.    Her prenatal care is complicated by  hypertension  gestational hypertension.  Her previous obstetric/gynecological history is noted for is remarkable for pre-eclampsia requiring IOL at 37 weeks.    The following portions of the patients history were reviewed and updated as appropriate: current medications, allergies, past medical history, past surgical history, past family history, past social history and problem list .       Prenatal Information:  Prenatal Results     Initial Prenatal Labs     Test Value Reference Range Date Time    Hemoglobin 12.8 g/dL 11.1 - 15.9 g/dL 18 1352    Hematocrit 39.1 % 34.0 - 46.6 % 18 1352    Platelets 252 10*3/mm3 140 - 500 10*3/mm3 18 1327    Rubella IgG 2.36 index Immune >0.99 index 18 1220    Hepatitis B SAg Negative  Negative 18 1220    Hepatitis C Ab <0.1 s/co ratio 0.0 - 0.9 s/co ratio 18 1220    RPR Non Reactive  Non Reactive 18 1220    ABO O   18 1327    Rh Positive   18 1327    Antibody Screen Negative  Negative 18 1220    HIV Non Reactive  Non Reactive 18 1220    Urine Culture Final report   18 1216    Gonorrhea Negative  Negative 18 1215    Chlamydia Negative  Negative 18 1215    TSH 1.640 uIU/mL 0.450 - 4.500 uIU/mL 18 1220          2nd and 3rd Trimester     Test Value Reference Range Date Time    Hemoglobin (repeated) 11.0 g/dL (L) 11.9 - 15.5 g/dL 18 1327    Hematocrit (repeated) 34.6 % (L) 35.6 - 45.5 % 18 1327    GCT  80 mg/dL 65 - 139 mg/dL 07/19/18 0941    Antibody Screen (repeated) Negative   09/07/18 1327    GTT Fasting        GTT 1 Hr 83   10/07/16     GTT 2 Hr        GTT 3 Hr        Group B Strep              Drug Screening     Test Value Reference Range Date Time    Amphetamine Screen        Barbiturate Screen        Benzodiazepine Screen        Methadone Screen        Phencyclidine Screen        Opiates Screen        THC Screen        Cocaine Screen        Propoxyphene Screen        Buprenorphine Screen        Methamphetamine Screen        Oxycodone Screen        Tryicyclic Antidepressants Screen              Other (Risk screening)     Test Value Reference Range Date Time    Varicella IgG <135 index (L) Immune >165 index 03/01/18 1220    Parvovirus IgG        CMV IgG        Cystic Fibrosis DECLINED   03/01/18     Hemoglobin electrophoresis NORMAL   03/01/18     NIPT        MSAFP-4        AFP (for NTD only) *Screen Negative*   05/29/18 1615              External Prenatal Results     Pregnancy Outside Results - Transcribed From Office Records - See Scanned Records For Details     Test Value Date Time    Hgb 11.0 g/dL (L) 09/07/18 1327    Hct 34.6 % (L) 09/07/18 1327    ABO O  09/07/18 1327    Rh Positive  09/07/18 1327    Antibody Screen Negative  09/07/18 1327    Glucose Fasting GTT       Glucose Tolerance Test 1 hour 83  10/07/16     Glucose Tolerance Test 3 hour       Gonorrhea (discrete) Negative  03/01/18 1215    Chlamydia (discrete) Negative  03/01/18 1215    RPR Non Reactive  03/01/18 1220    VDRL       Syphilis Antibody       Rubella 2.36 index 03/01/18 1220    HBsAg Negative  03/01/18 1220    Herpes Simplex Virus PCR       Herpes Simplex VIrus Culture       HIV Non Reactive  03/01/18 1220    Hep C RNA Quant PCR       Hep C Antibody <0.1 s/co ratio 03/01/18 1220    AFP 38.5 ng/mL 05/29/18 1615    Group B Strep Positive  (A) 12/05/16 1548    GBS Susceptibility to Clindamycin       GBS Susceptibility to Erythromycin        Fetal Fibronectin       Genetic Testing, Maternal Blood             Drug Screening     Test Value Date Time    Urine Drug Screen       Amphetamine Screen       Barbiturate Screen       Benzodiazepine Screen       Methadone Screen       Phencyclidine Screen       Opiates Screen       THC Screen       Cocaine Screen       Propoxyphene Screen       Buprenorphine Screen       Methamphetamine Screen       Oxycodone Screen       Tricyclic Antidepressants Screen                    Past OB History:     Obstetric History       T2      L2     SAB1   TAB0   Ectopic0   Molar0   Multiple0   Live Births2       # Outcome Date GA Lbr Avinash/2nd Weight Sex Delivery Anes PTL Lv   4 Current            3 Term 16 37w0d 03:02 / 00:13 3756 g (8 lb 4.5 oz) F Vag-Spont EPI N JOAN      Name: LUIS LIZ      Apgar1:  9                Apgar5: 9   2 Term 11/05/15 37w0d  2906 g (6 lb 6.5 oz) F Vag-Spont   JOAN      Name: CINTHIA      Complications: Preeclampsia      Apgar1:  8                Apgar5: 9   1 2014              Obstetric Comments   3.1.18 - 7-5 weeks by LMP - MICHAEL 10/13/18 - [facility]     5..29.18 - 20-3 weeks - 20-3 weeks - Normal but incompleted anatomy - [facility]     6.26.18 = 24-3 weeks - Normal completed anatomy, eFW 56%, aC 62% - [facility]     2018 30w2d normal interval growth: EFW 76% AC 93% hb    8.28.18 - 33-3 weeks - EFW >90%, AC > 97% - [facility]        Past Medical History: Past Medical History:   Diagnosis Date   • Anxiety    • Asthma    • Convulsive seizure (CMS/HCC)     Neurologic workup to date is inconclusive for seizure disorder, patient did not follow through with EEG, saw neurologist Dr. Willma Calderon   • Depression     hx when younger   • Eye infection    • Eye infection    • Mild preeclampsia    • Obstetrical complication    • Preeclampsia    • Rubella non-immune status, antepartum    • UTI in pregnancy       Past Surgical History Past Surgical History:   Procedure Laterality Date    • EAR TUBES        Family History: Family History   Problem Relation Age of Onset   • Hypertension Father    • Cancer Maternal Grandmother    • Stroke Maternal Grandfather    • Diabetes Maternal Grandfather    • Migraines Maternal Grandfather    • Dementia Maternal Grandfather    • Stroke Maternal Aunt    • Diabetes Maternal Uncle    • Migraines Mother    • Arthritis Mother       Social History:  reports that she has quit smoking. She has never used smokeless tobacco.   reports that she does not drink alcohol.   reports that she does not use drugs.        General ROS: Pertinent items are noted in HPI, all other systems reviewed and negative    Objective       Vital Signs Range for the last 24 hours  Temperature: Temp:  [98.5 °F (36.9 °C)-98.9 °F (37.2 °C)] 98.9 °F (37.2 °C)   Temp Source: Temp src: Oral   BP: BP: (138-174)/(68-96) 160/86   Pulse: Heart Rate:  [] 92   Respirations: Resp:  [20] 20   SPO2: SpO2:  [100 %] 100 %   O2 Amount (l/min):     O2 Devices     Weight: Weight:  [101 kg (223 lb)] 101 kg (223 lb)     Physical Examination: General appearance - alert, well appearing, and in no distress  Mental status - alert, oriented to person, place, and time  Abdomen - soft, nontender, gravid      Back exam - full range of motion, no tenderness, palpable spasm or pain on motion  Neurological - alert, oriented, normal speech, no focal findings or movement disorder noted  Musculoskeletal - no joint tenderness, deformity or swelling  Extremities - peripheral pulses normal, no pedal edema, no clubbing or cyanosis    Presentation:     Cervix: Exam by: Method: sterile exam per RN   Dilation: Cervical Dilation (cm): 9-10   Effacement: Cervical Effacement: 100%   Station:         Fetal Heart Rate Assessment   Method:     Beats/min:     Baseline:     Variability:     Accels:     Decels:     Tracing Category:       Uterine Assessment   Method:     Frequency (min):     Ctx Count in 10 min:     Duration:     Intensity:      Intensity by IUPC:     Resting Tone:     Resting Tone by IUPC:     Lawn Units:       Laboratory Results:  CBC and CMP normal, urine negative protein  Radiology Review:    Other Studies:      Assessment/Plan     Active Problems:    Pregnant    Elevated blood pressure complicating pregnancy, antepartum, third trimester     labor in third trimester without delivery        Assessment:  1.  Intrauterine pregnancy at 34w6d gestation with reactive, reassuring fetal status.    2.  labor  without ROM      Plan:  1. fetal and uterine monitoring  continuously, expectant management, BMZ x1 given  2. Plan of care has been reviewed with patient  3.  Risks, benefits of treatment plan have been discussed.  4.  All questions have been answered.        Denise Lazcano MD  2018  5:25 PM

## 2018-09-08 LAB
BASOPHILS # BLD AUTO: 0.01 10*3/MM3 (ref 0–0.2)
BASOPHILS NFR BLD AUTO: 0 % (ref 0–1.5)
DEPRECATED RDW RBC AUTO: 40.6 FL (ref 37–54)
EOSINOPHIL # BLD AUTO: 0 10*3/MM3 (ref 0–0.7)
EOSINOPHIL NFR BLD AUTO: 0 % (ref 0.3–6.2)
ERYTHROCYTE [DISTWIDTH] IN BLOOD BY AUTOMATED COUNT: 13.4 % (ref 11.7–13)
HCT VFR BLD AUTO: 34.3 % (ref 35.6–45.5)
HGB BLD-MCNC: 11 G/DL (ref 11.9–15.5)
IMM GRANULOCYTES # BLD: 0.17 10*3/MM3 (ref 0–0.03)
IMM GRANULOCYTES NFR BLD: 0.7 % (ref 0–0.5)
LYMPHOCYTES # BLD AUTO: 1.68 10*3/MM3 (ref 0.9–4.8)
LYMPHOCYTES NFR BLD AUTO: 7.3 % (ref 19.6–45.3)
MCH RBC QN AUTO: 26.7 PG (ref 26.9–32)
MCHC RBC AUTO-ENTMCNC: 32.1 G/DL (ref 32.4–36.3)
MCV RBC AUTO: 83.3 FL (ref 80.5–98.2)
MONOCYTES # BLD AUTO: 1.32 10*3/MM3 (ref 0.2–1.2)
MONOCYTES NFR BLD AUTO: 5.8 % (ref 5–12)
NEUTROPHILS # BLD AUTO: 19.86 10*3/MM3 (ref 1.9–8.1)
NEUTROPHILS NFR BLD AUTO: 86.9 % (ref 42.7–76)
PLATELET # BLD AUTO: 310 10*3/MM3 (ref 140–500)
PMV BLD AUTO: 10.9 FL (ref 6–12)
RBC # BLD AUTO: 4.12 10*6/MM3 (ref 3.9–5.2)
WBC NRBC COR # BLD: 22.87 10*3/MM3 (ref 4.5–10.7)

## 2018-09-08 PROCEDURE — 85025 COMPLETE CBC W/AUTO DIFF WBC: CPT | Performed by: OBSTETRICS & GYNECOLOGY

## 2018-09-08 RX ORDER — LABETALOL 200 MG/1
100 TABLET, FILM COATED ORAL ONCE
Status: COMPLETED | OUTPATIENT
Start: 2018-09-08 | End: 2018-09-08

## 2018-09-08 RX ADMIN — OXYCODONE HYDROCHLORIDE AND ACETAMINOPHEN 1 TABLET: 5; 325 TABLET ORAL at 04:16

## 2018-09-08 RX ADMIN — IBUPROFEN 600 MG: 600 TABLET ORAL at 13:24

## 2018-09-08 RX ADMIN — IBUPROFEN 600 MG: 600 TABLET ORAL at 21:05

## 2018-09-08 RX ADMIN — LABETALOL HCL 200 MG: 200 TABLET, FILM COATED ORAL at 15:25

## 2018-09-08 RX ADMIN — OXYCODONE HYDROCHLORIDE AND ACETAMINOPHEN 2 TABLET: 5; 325 TABLET ORAL at 21:05

## 2018-09-08 RX ADMIN — DOCUSATE SODIUM 100 MG: 100 CAPSULE, LIQUID FILLED ORAL at 03:04

## 2018-09-08 RX ADMIN — LABETALOL HCL 100 MG: 200 TABLET, FILM COATED ORAL at 17:45

## 2018-09-08 RX ADMIN — LABETALOL HCL 200 MG: 200 TABLET, FILM COATED ORAL at 22:03

## 2018-09-08 RX ADMIN — Medication: at 10:43

## 2018-09-08 RX ADMIN — OXYCODONE HYDROCHLORIDE AND ACETAMINOPHEN 1 TABLET: 5; 325 TABLET ORAL at 10:42

## 2018-09-08 RX ADMIN — IBUPROFEN 600 MG: 600 TABLET ORAL at 03:04

## 2018-09-08 RX ADMIN — DOCUSATE SODIUM 100 MG: 100 CAPSULE, LIQUID FILLED ORAL at 21:06

## 2018-09-08 RX ADMIN — OXYCODONE HYDROCHLORIDE AND ACETAMINOPHEN 1 TABLET: 5; 325 TABLET ORAL at 15:26

## 2018-09-08 NOTE — PLAN OF CARE
Problem: Patient Care Overview  Goal: Plan of Care Review  Outcome: Ongoing (interventions implemented as appropriate)   18 1800   Plan of Care Review   Progress no change   OTHER   Outcome Summary BLOOD PRESSURES HIGH THIS AFTERNOON. PT REFUSED LAST PM AND THIS AM DOSE OF LABETALOL. MD AWARE, SEE ORDERS. AMBULATES TO NICU AND CAFETERIA.      Goal: Individualization and Mutuality  Outcome: Ongoing (interventions implemented as appropriate)    Goal: Discharge Needs Assessment  Outcome: Ongoing (interventions implemented as appropriate)    Goal: Interprofessional Rounds/Family Conf  Outcome: Ongoing (interventions implemented as appropriate)      Problem: Fall Risk,  (Adult,Obstetrics,Pediatric)  Goal: Identify Related Risk Factors and Signs and Symptoms  Outcome: Ongoing (interventions implemented as appropriate)    Goal: Absence of Maternal Fall  Outcome: Ongoing (interventions implemented as appropriate)    Goal: Absence of Dumont Fall/Drop  Outcome: Ongoing (interventions implemented as appropriate)      Problem: Skin Injury Risk (Adult)  Goal: Identify Related Risk Factors and Signs and Symptoms  Outcome: Ongoing (interventions implemented as appropriate)    Goal: Skin Health and Integrity  Outcome: Ongoing (interventions implemented as appropriate)      Problem: Postpartum (Vaginal Delivery) (Adult,Obstetrics,Pediatric)  Goal: Signs and Symptoms of Listed Potential Problems Will be Absent, Minimized or Managed (Postpartum)  Outcome: Ongoing (interventions implemented as appropriate)

## 2018-09-08 NOTE — PROGRESS NOTES
"Postpartum Vaginal Delivery Note PPD #1     CC: post vaginal delivery    Subjective:  Patient is off floor. We tried overhead page, called NICU and checked back 3 times    Patient is now back to the floor.  She was in the cafeteria.  She notes her pain is controlled with Motrin and Percocet.  She plans bottle feeding.  Baby is in the NICU.  She does desire circumcision for her son when he is able.        Vitals:   /84 (BP Location: Left arm, Patient Position: Sitting)   Pulse 79   Temp 97.6 °F (36.4 °C) (Oral)   Resp 16   Ht 157.5 cm (62\")   Wt 101 kg (223 lb)   LMP 01/06/2018 (Exact Date)   SpO2 100%   Breastfeeding? Unknown   BMI 40.79 kg/m²         Exam:   Gen. patient is lying in bed in no acute distress  Abdomen-soft /nontender,  fundus is firm at the umbilicus  Extremities-trace edema.  Nontender bilaterally    Labs:  Recent Results (from the past 36 hour(s))   Comprehensive Metabolic Panel    Collection Time: 09/07/18  1:27 PM   Result Value Ref Range    Glucose 75 65 - 99 mg/dL    BUN 6 6 - 20 mg/dL    Creatinine 0.52 (L) 0.57 - 1.00 mg/dL    Sodium 137 136 - 145 mmol/L    Potassium 3.8 3.5 - 5.2 mmol/L    Chloride 104 98 - 107 mmol/L    CO2 19.6 (L) 22.0 - 29.0 mmol/L    Calcium 8.9 8.6 - 10.5 mg/dL    Total Protein 6.5 6.0 - 8.5 g/dL    Albumin 3.60 3.50 - 5.20 g/dL    ALT (SGPT) 16 1 - 33 U/L    AST (SGOT) 15 1 - 32 U/L    Alkaline Phosphatase 120 (H) 39 - 117 U/L    Total Bilirubin 0.3 0.1 - 1.2 mg/dL    eGFR Non African Amer 149 >60 mL/min/1.73    Globulin 2.9 gm/dL    A/G Ratio 1.2 g/dL    BUN/Creatinine Ratio 11.5 7.0 - 25.0    Anion Gap 13.4 mmol/L   CBC Auto Differential    Collection Time: 09/07/18  1:27 PM   Result Value Ref Range    WBC 16.04 (H) 4.50 - 10.70 10*3/mm3    RBC 4.15 3.90 - 5.20 10*6/mm3    Hemoglobin 11.0 (L) 11.9 - 15.5 g/dL    Hematocrit 34.6 (L) 35.6 - 45.5 %    MCV 83.4 80.5 - 98.2 fL    MCH 26.5 (L) 26.9 - 32.0 pg    MCHC 31.8 (L) 32.4 - 36.3 g/dL    RDW 13.4 (H) " 11.7 - 13.0 %    RDW-SD 40.3 37.0 - 54.0 fl    MPV 10.7 6.0 - 12.0 fL    Platelets 252 140 - 500 10*3/mm3    Neutrophil % 81.5 (H) 42.7 - 76.0 %    Lymphocyte % 11.7 (L) 19.6 - 45.3 %    Monocyte % 6.1 5.0 - 12.0 %    Eosinophil % 0.6 0.3 - 6.2 %    Basophil % 0.1 0.0 - 1.5 %    Immature Grans % 0.7 (H) 0.0 - 0.5 %    Neutrophils, Absolute 13.06 (H) 1.90 - 8.10 10*3/mm3    Lymphocytes, Absolute 1.88 0.90 - 4.80 10*3/mm3    Monocytes, Absolute 0.98 0.20 - 1.20 10*3/mm3    Eosinophils, Absolute 0.10 0.00 - 0.70 10*3/mm3    Basophils, Absolute 0.02 0.00 - 0.20 10*3/mm3    Immature Grans, Absolute 0.12 (H) 0.00 - 0.03 10*3/mm3   Type & Screen    Collection Time: 09/07/18  1:27 PM   Result Value Ref Range    ABO Type O     RH type Positive     Antibody Screen Negative     T&S Expiration Date 9/10/2018 11:59:59 PM    Creatinine clearance serum    Collection Time: 09/07/18  1:27 PM   Result Value Ref Range    Creatinine 0.52 (L) 0.57 - 1.00 mg/dL    eGFR Non African Amer 149 >60 mL/min/1.73   POC Protein, Urine, Qualitative, Dipstick    Collection Time: 09/07/18  1:49 PM   Result Value Ref Range    Protein, POC Negative Negative mg/dL    Lot Number [id number]     Expiration Date 09/30/2019    CBC Auto Differential    Collection Time: 09/08/18  6:38 AM   Result Value Ref Range    WBC 22.87 (H) 4.50 - 10.70 10*3/mm3    RBC 4.12 3.90 - 5.20 10*6/mm3    Hemoglobin 11.0 (L) 11.9 - 15.5 g/dL    Hematocrit 34.3 (L) 35.6 - 45.5 %    MCV 83.3 80.5 - 98.2 fL    MCH 26.7 (L) 26.9 - 32.0 pg    MCHC 32.1 (L) 32.4 - 36.3 g/dL    RDW 13.4 (H) 11.7 - 13.0 %    RDW-SD 40.6 37.0 - 54.0 fl    MPV 10.9 6.0 - 12.0 fL    Platelets 310 140 - 500 10*3/mm3    Neutrophil % 86.9 (H) 42.7 - 76.0 %    Lymphocyte % 7.3 (L) 19.6 - 45.3 %    Monocyte % 5.8 5.0 - 12.0 %    Eosinophil % 0.0 (L) 0.3 - 6.2 %    Basophil % 0.0 0.0 - 1.5 %    Immature Grans % 0.7 (H) 0.0 - 0.5 %    Neutrophils, Absolute 19.86 (H) 1.90 - 8.10 10*3/mm3    Lymphocytes, Absolute 1.68  0.90 - 4.80 10*3/mm3    Monocytes, Absolute 1.32 (H) 0.20 - 1.20 10*3/mm3    Eosinophils, Absolute 0.00 0.00 - 0.70 10*3/mm3    Basophils, Absolute 0.01 0.00 - 0.20 10*3/mm3    Immature Grans, Absolute 0.17 (H) 0.00 - 0.03 10*3/mm3       Patient Active Problem List   Diagnosis   • Maternal varicella, non-immune   • Generalized convulsive seizures (CMS/HCC)   • Hx of preeclampsia, prior pregnancy, currently pregnant   • Obesity affecting pregnancy in second trimester   • Sciatica, left side   • Pregnancy   • Chronic hypertension affecting pregnancy   • Chronic headaches   • High-risk pregnancy supervision   • Anemia affecting pregnancy   • Excessive fetal growth affecting management of pregnancy in third trimester   • Pregnant   • Elevated blood pressure complicating pregnancy, antepartum, third trimester   •  labor in third trimester without delivery       Assessment and Plan:  Rhina Marquez is a 23 y.o. female  s/p   1. Blood cell count is elevated at 22-repeat in a.m.  2. Continue medication prn for pain  3. Encouraged ambulation           Signed By:  Abdirashid Romero MD       2018 12:16 PM

## 2018-09-09 VITALS
WEIGHT: 223 LBS | SYSTOLIC BLOOD PRESSURE: 142 MMHG | HEART RATE: 71 BPM | DIASTOLIC BLOOD PRESSURE: 88 MMHG | HEIGHT: 62 IN | TEMPERATURE: 97.7 F | OXYGEN SATURATION: 100 % | BODY MASS INDEX: 41.04 KG/M2 | RESPIRATION RATE: 16 BRPM

## 2018-09-09 LAB
BASOPHILS # BLD AUTO: 0.06 10*3/MM3 (ref 0–0.2)
BASOPHILS NFR BLD AUTO: 0.3 % (ref 0–1.5)
DEPRECATED RDW RBC AUTO: 41.7 FL (ref 37–54)
EOSINOPHIL # BLD AUTO: 0.13 10*3/MM3 (ref 0–0.7)
EOSINOPHIL NFR BLD AUTO: 0.6 % (ref 0.3–6.2)
ERYTHROCYTE [DISTWIDTH] IN BLOOD BY AUTOMATED COUNT: 13.7 % (ref 11.7–13)
HCT VFR BLD AUTO: 31.4 % (ref 35.6–45.5)
HGB BLD-MCNC: 10.2 G/DL (ref 11.9–15.5)
IMM GRANULOCYTES # BLD: 0.13 10*3/MM3 (ref 0–0.03)
IMM GRANULOCYTES NFR BLD: 0.6 % (ref 0–0.5)
LYMPHOCYTES # BLD AUTO: 4.25 10*3/MM3 (ref 0.9–4.8)
LYMPHOCYTES NFR BLD AUTO: 21 % (ref 19.6–45.3)
MCH RBC QN AUTO: 27.6 PG (ref 26.9–32)
MCHC RBC AUTO-ENTMCNC: 32.5 G/DL (ref 32.4–36.3)
MCV RBC AUTO: 85.1 FL (ref 80.5–98.2)
MONOCYTES # BLD AUTO: 1.11 10*3/MM3 (ref 0.2–1.2)
MONOCYTES NFR BLD AUTO: 5.5 % (ref 5–12)
NEUTROPHILS # BLD AUTO: 14.72 10*3/MM3 (ref 1.9–8.1)
NEUTROPHILS NFR BLD AUTO: 72.6 % (ref 42.7–76)
PLATELET # BLD AUTO: 264 10*3/MM3 (ref 140–500)
PMV BLD AUTO: 10.7 FL (ref 6–12)
RBC # BLD AUTO: 3.69 10*6/MM3 (ref 3.9–5.2)
WBC NRBC COR # BLD: 20.27 10*3/MM3 (ref 4.5–10.7)

## 2018-09-09 PROCEDURE — 85025 COMPLETE CBC W/AUTO DIFF WBC: CPT | Performed by: OBSTETRICS & GYNECOLOGY

## 2018-09-09 PROCEDURE — 99024 POSTOP FOLLOW-UP VISIT: CPT | Performed by: OBSTETRICS & GYNECOLOGY

## 2018-09-09 RX ORDER — IBUPROFEN 600 MG/1
600 TABLET ORAL EVERY 8 HOURS PRN
Qty: 30 TABLET | Refills: 0 | Status: SHIPPED | OUTPATIENT
Start: 2018-09-09 | End: 2020-07-22

## 2018-09-09 RX ORDER — LABETALOL 200 MG/1
200 TABLET, FILM COATED ORAL EVERY 12 HOURS SCHEDULED
Qty: 60 TABLET | Refills: 1 | Status: SHIPPED | OUTPATIENT
Start: 2018-09-09 | End: 2018-09-16 | Stop reason: HOSPADM

## 2018-09-09 RX ORDER — OXYCODONE HYDROCHLORIDE AND ACETAMINOPHEN 5; 325 MG/1; MG/1
1 TABLET ORAL EVERY 4 HOURS PRN
Qty: 15 TABLET | Refills: 0 | Status: SHIPPED | OUTPATIENT
Start: 2018-09-09 | End: 2018-09-17

## 2018-09-09 RX ADMIN — IBUPROFEN 600 MG: 600 TABLET ORAL at 09:31

## 2018-09-09 RX ADMIN — OXYCODONE HYDROCHLORIDE AND ACETAMINOPHEN 2 TABLET: 5; 325 TABLET ORAL at 09:31

## 2018-09-09 RX ADMIN — LABETALOL HCL 200 MG: 200 TABLET, FILM COATED ORAL at 09:30

## 2018-09-09 RX ADMIN — DOCUSATE SODIUM 100 MG: 100 CAPSULE, LIQUID FILLED ORAL at 09:32

## 2018-09-09 NOTE — PLAN OF CARE
Problem: Patient Care Overview  Goal: Plan of Care Review  Outcome: Ongoing (interventions implemented as appropriate)   09/09/18 0103   Coping/Psychosocial   Plan of Care Reviewed With patient   Plan of Care Review   Progress improving   OTHER   Outcome Summary AVSS, pt agreed to take LAbetalol tonight, pain well controlled with po pain meds.     Goal: Individualization and Mutuality  Outcome: Ongoing (interventions implemented as appropriate)    Goal: Discharge Needs Assessment  Outcome: Ongoing (interventions implemented as appropriate)    Goal: Interprofessional Rounds/Family Conf  Outcome: Ongoing (interventions implemented as appropriate)      Problem: Postpartum (Vaginal Delivery) (Adult,Obstetrics,Pediatric)  Goal: Signs and Symptoms of Listed Potential Problems Will be Absent, Minimized or Managed (Postpartum)  Outcome: Ongoing (interventions implemented as appropriate)

## 2018-09-09 NOTE — PROGRESS NOTES
Postpartum Vaginal Delivery Note PPD# 2    CC: post vaginal delivery    Subjective:  Patient had declined her labetalol yesterday and had an elevated blood pressure with a systolic in the 150s.  She was given a 100 mg dose of labetalol and her regular nightly dose of 200 mg.  Blood pressures are improved.  She denies headache or visual changes.  Baby is in the NICU but patient would like to be discharged.  Pain well controlled. Lochia normal. Ambulating well. Tolerating regular diet. Voiding without difficulty. No complaints    ROS: No fever or chills. No N/V. No leg pain.    Vitals:   Patient Vitals for the past 24 hrs:   BP Temp Temp src Pulse Resp   09/09/18 0710 142/88 97.7 °F (36.5 °C) Oral 71 16   09/09/18 0400 125/80 97.7 °F (36.5 °C) Oral 72 16   09/08/18 2340 138/87 97.6 °F (36.4 °C) Oral 75 16   09/08/18 1915 111/73 98.3 °F (36.8 °C) Oral 73 16   09/08/18 1700 154/97 - - 79 18   09/08/18 1520 154/89 97.2 °F (36.2 °C) Oral 81 18         Exam:   Gen: NAD, cooperative, conversive  Resp: Nonlabored breathing  Abd: Soft, nondistended, fundus is firm below umbillicus, approp tender  CV: Trace LE edema  Ext: Nontender bilaterally  Labs:  Recent Results (from the past 36 hour(s))   CBC Auto Differential    Collection Time: 09/08/18  6:38 AM   Result Value Ref Range    WBC 22.87 (H) 4.50 - 10.70 10*3/mm3    RBC 4.12 3.90 - 5.20 10*6/mm3    Hemoglobin 11.0 (L) 11.9 - 15.5 g/dL    Hematocrit 34.3 (L) 35.6 - 45.5 %    MCV 83.3 80.5 - 98.2 fL    MCH 26.7 (L) 26.9 - 32.0 pg    MCHC 32.1 (L) 32.4 - 36.3 g/dL    RDW 13.4 (H) 11.7 - 13.0 %    RDW-SD 40.6 37.0 - 54.0 fl    MPV 10.9 6.0 - 12.0 fL    Platelets 310 140 - 500 10*3/mm3    Neutrophil % 86.9 (H) 42.7 - 76.0 %    Lymphocyte % 7.3 (L) 19.6 - 45.3 %    Monocyte % 5.8 5.0 - 12.0 %    Eosinophil % 0.0 (L) 0.3 - 6.2 %    Basophil % 0.0 0.0 - 1.5 %    Immature Grans % 0.7 (H) 0.0 - 0.5 %    Neutrophils, Absolute 19.86 (H) 1.90 - 8.10 10*3/mm3    Lymphocytes, Absolute  1.68 0.90 - 4.80 10*3/mm3    Monocytes, Absolute 1.32 (H) 0.20 - 1.20 10*3/mm3    Eosinophils, Absolute 0.00 0.00 - 0.70 10*3/mm3    Basophils, Absolute 0.01 0.00 - 0.20 10*3/mm3    Immature Grans, Absolute 0.17 (H) 0.00 - 0.03 10*3/mm3   CBC Auto Differential    Collection Time: 18  6:57 AM   Result Value Ref Range    WBC 20.27 (H) 4.50 - 10.70 10*3/mm3    RBC 3.69 (L) 3.90 - 5.20 10*6/mm3    Hemoglobin 10.2 (L) 11.9 - 15.5 g/dL    Hematocrit 31.4 (L) 35.6 - 45.5 %    MCV 85.1 80.5 - 98.2 fL    MCH 27.6 26.9 - 32.0 pg    MCHC 32.5 32.4 - 36.3 g/dL    RDW 13.7 (H) 11.7 - 13.0 %    RDW-SD 41.7 37.0 - 54.0 fl    MPV 10.7 6.0 - 12.0 fL    Platelets 264 140 - 500 10*3/mm3    Neutrophil % 72.6 42.7 - 76.0 %    Lymphocyte % 21.0 19.6 - 45.3 %    Monocyte % 5.5 5.0 - 12.0 %    Eosinophil % 0.6 0.3 - 6.2 %    Basophil % 0.3 0.0 - 1.5 %    Immature Grans % 0.6 (H) 0.0 - 0.5 %    Neutrophils, Absolute 14.72 (H) 1.90 - 8.10 10*3/mm3    Lymphocytes, Absolute 4.25 0.90 - 4.80 10*3/mm3    Monocytes, Absolute 1.11 0.20 - 1.20 10*3/mm3    Eosinophils, Absolute 0.13 0.00 - 0.70 10*3/mm3    Basophils, Absolute 0.06 0.00 - 0.20 10*3/mm3    Immature Grans, Absolute 0.13 (H) 0.00 - 0.03 10*3/mm3       Patient Active Problem List   Diagnosis   • Maternal varicella, non-immune   • Generalized convulsive seizures (CMS/HCC)   • Hx of preeclampsia, prior pregnancy, currently pregnant   • Obesity affecting pregnancy in second trimester   • Sciatica, left side   • Pregnancy   • Chronic hypertension affecting pregnancy   • Chronic headaches   • High-risk pregnancy supervision   • Anemia affecting pregnancy   • Excessive fetal growth affecting management of pregnancy in third trimester   • Pregnant   • Elevated blood pressure complicating pregnancy, antepartum, third trimester   •  labor in third trimester without delivery         Assessment and Plan:  Rhina Marquez is a 23 y.o. female  s/p   1. Doing well, d/c home  today.  Continue labetalol for gestational hypertension and follow-up in one week in the office.  I discussed symptoms for the patient to call for.  2. Precautions given-pelvic rest for 6 weeks  3. RTC in 6 weeks.   4. Ambulation encouraged.  5.  Blood cell count is elevated but decreased slightly.  Likely a steroid effect as the patient is afebrile.  Plan to recheck in the office.         Signed By:  Abdirashid Romero MD       September 9, 2018 8:50 AM

## 2018-09-09 NOTE — DISCHARGE SUMMARY
Vaginal delivery Discharge Summary      Date of Admission: 2018    Date of Discharge:  2018    Patient: Rhina Marquez      MR#:[id number]    Surgeon/OB: Denise Lazcano     Discharge Diagnosis: Vaginal Delivery at 34w6d, uncomplicated recovery.  The patient came in in  labor.  Patient delivered and baby went to the NICU.  Baby is stable in the NICU.  Patient did have an elevated white count but did receive steroids.  Patient initially refused her labetalol because her blood pressures were normal but then her blood pressures went up on postpartum day 1.  We encouraged her to take her labetalol as needed and an extra dose of 100 mg was given.  Blood pressure this morning is improved she has no symptoms.  She will continue her medication and watch for symptoms.  She will follow up in the office in 1 week for blood pressure check.  She noted that she has had varicella vaccination as a child and did receive 1 vaccination in 2016 during prior hospitalizaton.    Procedures:  Vaginal, Spontaneous Delivery     2018    5:51 PM      Anesthesia:  Epidural     Presenting Problem/History of Present Illness  Pregnant [Z34.90]  Elevated blood pressure complicating pregnancy, antepartum, third trimester [O13.3]   (spontaneous vaginal delivery) [O80]     Patient Active Problem List   Diagnosis   • Maternal varicella, non-immune   • Generalized convulsive seizures (CMS/HCC)   • Hx of preeclampsia, prior pregnancy, currently pregnant   • Obesity affecting pregnancy in second trimester   • Sciatica, left side   • Pregnancy   • Chronic hypertension affecting pregnancy   • Chronic headaches   • High-risk pregnancy supervision   • Anemia affecting pregnancy   • Excessive fetal growth affecting management of pregnancy in third trimester   • Pregnant   • Elevated blood pressure complicating pregnancy, antepartum, third trimester   •  labor in third trimester without delivery       Hospital  Course  Patient is a 23 y.o. female  at 34w6d status post vaginal delivery.    Uneventful recovery.  Patient is ambulating, tolerating a regular diet.  Perineum is intact.    Infant:   male  fetus 3162 g (6 lb 15.5 oz)  with Apgar scores of 8  , 9   at five minutes.    Condition on Discharge:  Stable    Vital Signs  Temp:  [97.2 °F (36.2 °C)-98.3 °F (36.8 °C)] 97.7 °F (36.5 °C)  Heart Rate:  [71-81] 71  Resp:  [16-18] 16  BP: (111-154)/(73-97) 142/88    Lab Results   Component Value Date    WBC 20.27 (H) 2018    HGB 10.2 (L) 2018    HCT 31.4 (L) 2018    MCV 85.1 2018     2018       Discharge Disposition  Home or Self Care    Discharge Medications     Discharge Medications      New Medications      Instructions Start Date   ibuprofen 600 MG tablet  Commonly known as:  ADVIL,MOTRIN   600 mg, Oral, Every 8 Hours PRN      oxyCODONE-acetaminophen 5-325 MG per tablet  Commonly known as:  PERCOCET   1 tablet, Oral, Every 4 Hours PRN         Changes to Medications      Instructions Start Date   labetalol 200 MG tablet  Commonly known as:  NORMODYNE  What changed:  Another medication with the same name was added. Make sure you understand how and when to take each.   200 mg, Oral, Every 12 Hours Scheduled      labetalol 200 MG tablet  Commonly known as:  NORMODYNE  What changed:  You were already taking a medication with the same name, and this prescription was added. Make sure you understand how and when to take each.   200 mg, Oral, Every 12 Hours Scheduled         Continue These Medications      Instructions Start Date   PRENATAL GUMMY VITAMIN   1 each, Oral, Daily      VENTOLIN  (90 Base) MCG/ACT inhaler  Generic drug:  albuterol   2 puffs, Inhalation, Every 6 Hours PRN         Stop These Medications    ferrous sulfate 325 (65 FE) MG tablet            Discharge Diet: Regular    Activity at Discharge:     Follow-up Appointments  Future Appointments  Date Time Provider  Department Center   9/11/2018 2:00 PM [facility] None   9/11/2018 2:15 PM Anali Del Angel MD [facility] None   9/18/2018 3:00 PM [facility] None   9/18/2018 3:15 PM Anali Del Angel MD [facility] None   9/25/2018 3:15 PM Anali Del Angel MD [facility] None   10/2/2018 1:30 PM Anali Del Angel MD [facility] None   10/11/2018 1:00 PM Caroline Arreguin APRN [facility] None     Additional Instructions for the Follow-ups that You Need to Schedule     Discharge Follow-up with Specialty: ob; 1 Week    As directed      Specialty:  ob    Follow Up:  1 Week    Follow Up Details:  Dr. Del Angel for blood pressure check                 Prenatal labs/vax:     Abdirashid Romero MD  09/09/18  9:00 AM

## 2018-09-14 ENCOUNTER — APPOINTMENT (OUTPATIENT)
Dept: MRI IMAGING | Facility: HOSPITAL | Age: 23
End: 2018-09-14

## 2018-09-14 ENCOUNTER — APPOINTMENT (OUTPATIENT)
Dept: CT IMAGING | Facility: HOSPITAL | Age: 23
End: 2018-09-14

## 2018-09-14 ENCOUNTER — POSTPARTUM VISIT (OUTPATIENT)
Dept: OBSTETRICS AND GYNECOLOGY | Age: 23
End: 2018-09-14

## 2018-09-14 ENCOUNTER — APPOINTMENT (OUTPATIENT)
Dept: CARDIOLOGY | Facility: HOSPITAL | Age: 23
End: 2018-09-14
Attending: OBSTETRICS & GYNECOLOGY

## 2018-09-14 ENCOUNTER — HOSPITAL ENCOUNTER (OUTPATIENT)
Facility: HOSPITAL | Age: 23
Setting detail: OBSERVATION
Discharge: HOME | End: 2018-09-16
Attending: EMERGENCY MEDICINE | Admitting: EMERGENCY MEDICINE

## 2018-09-14 VITALS
DIASTOLIC BLOOD PRESSURE: 100 MMHG | WEIGHT: 209 LBS | BODY MASS INDEX: 38.46 KG/M2 | SYSTOLIC BLOOD PRESSURE: 140 MMHG | HEIGHT: 62 IN

## 2018-09-14 DIAGNOSIS — I10 CHRONIC HYPERTENSION: ICD-10-CM

## 2018-09-14 DIAGNOSIS — Z13.89 SCREENING FOR BLOOD OR PROTEIN IN URINE: Primary | ICD-10-CM

## 2018-09-14 DIAGNOSIS — R51.9 CHRONIC INTRACTABLE HEADACHE, UNSPECIFIED HEADACHE TYPE: ICD-10-CM

## 2018-09-14 DIAGNOSIS — G89.29 CHRONIC INTRACTABLE HEADACHE, UNSPECIFIED HEADACHE TYPE: ICD-10-CM

## 2018-09-14 PROBLEM — M54.32 SCIATICA, LEFT SIDE: Status: RESOLVED | Noted: 2018-05-29 | Resolved: 2018-09-14

## 2018-09-14 PROBLEM — O16.3 ELEVATED BLOOD PRESSURE COMPLICATING PREGNANCY, ANTEPARTUM, THIRD TRIMESTER: Status: RESOLVED | Noted: 2018-09-07 | Resolved: 2018-09-14

## 2018-09-14 PROBLEM — Z34.90 PREGNANCY: Status: RESOLVED | Noted: 2018-06-03 | Resolved: 2018-09-14

## 2018-09-14 PROBLEM — Z34.90 PREGNANT: Status: RESOLVED | Noted: 2018-09-07 | Resolved: 2018-09-14

## 2018-09-14 PROBLEM — O36.63X0 EXCESSIVE FETAL GROWTH AFFECTING MANAGEMENT OF PREGNANCY IN THIRD TRIMESTER: Status: RESOLVED | Noted: 2018-08-28 | Resolved: 2018-09-14

## 2018-09-14 PROBLEM — O60.03 PRETERM LABOR IN THIRD TRIMESTER WITHOUT DELIVERY: Status: RESOLVED | Noted: 2018-09-07 | Resolved: 2018-09-14

## 2018-09-14 PROBLEM — O09.90 HIGH-RISK PREGNANCY SUPERVISION: Status: RESOLVED | Noted: 2018-07-19 | Resolved: 2018-09-14

## 2018-09-14 PROBLEM — E23.6 PITUITARY GLAND ENLARGED (HCC): Status: ACTIVE | Noted: 2018-09-14

## 2018-09-14 PROBLEM — O99.019 ANEMIA AFFECTING PREGNANCY: Status: RESOLVED | Noted: 2018-07-20 | Resolved: 2018-09-14

## 2018-09-14 LAB
ALBUMIN SERPL-MCNC: 3.8 G/DL (ref 3.5–5.2)
ALBUMIN/GLOB SERPL: 1.4 G/DL
ALP SERPL-CCNC: 96 U/L (ref 39–117)
ALT SERPL W P-5'-P-CCNC: 18 U/L (ref 1–33)
ANION GAP SERPL CALCULATED.3IONS-SCNC: 10.2 MMOL/L
AST SERPL-CCNC: 13 U/L (ref 1–32)
BACTERIA UR QL AUTO: ABNORMAL /HPF
BASOPHILS # BLD AUTO: 0.03 10*3/MM3 (ref 0–0.2)
BASOPHILS NFR BLD AUTO: 0.3 % (ref 0–1.5)
BILIRUB BLD-MCNC: ABNORMAL MG/DL
BILIRUB SERPL-MCNC: 0.2 MG/DL (ref 0.1–1.2)
BILIRUB UR QL STRIP: NEGATIVE
BUN BLD-MCNC: 18 MG/DL (ref 6–20)
BUN/CREAT SERPL: 32.1 (ref 7–25)
CALCIUM SPEC-SCNC: 9.2 MG/DL (ref 8.6–10.5)
CHLORIDE SERPL-SCNC: 107 MMOL/L (ref 98–107)
CLARITY UR: ABNORMAL
CLARITY, POC: CLEAR
CO2 SERPL-SCNC: 25.8 MMOL/L (ref 22–29)
COLOR UR: ABNORMAL
COLOR UR: YELLOW
CREAT BLD-MCNC: 0.56 MG/DL (ref 0.57–1)
DEPRECATED RDW RBC AUTO: 41.1 FL (ref 37–54)
EOSINOPHIL # BLD AUTO: 0.43 10*3/MM3 (ref 0–0.7)
EOSINOPHIL NFR BLD AUTO: 3.7 % (ref 0.3–6.2)
ERYTHROCYTE [DISTWIDTH] IN BLOOD BY AUTOMATED COUNT: 13.4 % (ref 11.7–13)
GFR SERPL CREATININE-BSD FRML MDRD: 137 ML/MIN/1.73
GLOBULIN UR ELPH-MCNC: 2.7 GM/DL
GLUCOSE BLD-MCNC: 104 MG/DL (ref 65–99)
GLUCOSE UR STRIP-MCNC: NEGATIVE MG/DL
GLUCOSE UR STRIP-MCNC: NEGATIVE MG/DL
HCT VFR BLD AUTO: 37.9 % (ref 35.6–45.5)
HGB BLD-MCNC: 11.6 G/DL (ref 11.9–15.5)
HGB UR QL STRIP.AUTO: ABNORMAL
HOLD SPECIMEN: NORMAL
HYALINE CASTS UR QL AUTO: ABNORMAL /LPF
IMM GRANULOCYTES # BLD: 0.1 10*3/MM3 (ref 0–0.03)
IMM GRANULOCYTES NFR BLD: 0.9 % (ref 0–0.5)
KETONES UR QL STRIP: NEGATIVE
KETONES UR QL: NEGATIVE
LEUKOCYTE EST, POC: ABNORMAL
LEUKOCYTE ESTERASE UR QL STRIP.AUTO: ABNORMAL
LYMPHOCYTES # BLD AUTO: 2.58 10*3/MM3 (ref 0.9–4.8)
LYMPHOCYTES NFR BLD AUTO: 22.1 % (ref 19.6–45.3)
MAGNESIUM SERPL-MCNC: 2 MG/DL (ref 1.6–2.6)
MCH RBC QN AUTO: 26.1 PG (ref 26.9–32)
MCHC RBC AUTO-ENTMCNC: 30.6 G/DL (ref 32.4–36.3)
MCV RBC AUTO: 85.2 FL (ref 80.5–98.2)
MONOCYTES # BLD AUTO: 0.62 10*3/MM3 (ref 0.2–1.2)
MONOCYTES NFR BLD AUTO: 5.3 % (ref 5–12)
NEUTROPHILS # BLD AUTO: 7.91 10*3/MM3 (ref 1.9–8.1)
NEUTROPHILS NFR BLD AUTO: 67.7 % (ref 42.7–76)
NITRITE UR QL STRIP: NEGATIVE
NITRITE UR-MCNC: NEGATIVE MG/ML
PH UR STRIP.AUTO: 6 [PH] (ref 5–8)
PH UR: 6 [PH] (ref 5–8)
PLATELET # BLD AUTO: 388 10*3/MM3 (ref 140–500)
PMV BLD AUTO: 10.3 FL (ref 6–12)
POTASSIUM BLD-SCNC: 3.9 MMOL/L (ref 3.5–5.2)
PROT SERPL-MCNC: 6.5 G/DL (ref 6–8.5)
PROT UR QL STRIP: ABNORMAL
PROT UR STRIP-MCNC: ABNORMAL MG/DL
RBC # BLD AUTO: 4.45 10*6/MM3 (ref 3.9–5.2)
RBC # UR STRIP: ABNORMAL /UL
RBC # UR: ABNORMAL /HPF
REF LAB TEST METHOD: ABNORMAL
SODIUM BLD-SCNC: 143 MMOL/L (ref 136–145)
SP GR UR STRIP: >=1.03 (ref 1–1.03)
SP GR UR: 1.03 (ref 1–1.03)
SQUAMOUS #/AREA URNS HPF: ABNORMAL /HPF
UROBILINOGEN UR QL STRIP: ABNORMAL
UROBILINOGEN UR QL: NORMAL
WBC NRBC COR # BLD: 11.67 10*3/MM3 (ref 4.5–10.7)
WBC UR QL AUTO: ABNORMAL /HPF
WHOLE BLOOD HOLD SPECIMEN: NORMAL

## 2018-09-14 PROCEDURE — 85025 COMPLETE CBC W/AUTO DIFF WBC: CPT | Performed by: EMERGENCY MEDICINE

## 2018-09-14 PROCEDURE — 25010000002 MAGNESIUM SULFATE 40 GM/1000ML SOLUTION: Performed by: EMERGENCY MEDICINE

## 2018-09-14 PROCEDURE — G0378 HOSPITAL OBSERVATION PER HR: HCPCS

## 2018-09-14 PROCEDURE — 83735 ASSAY OF MAGNESIUM: CPT | Performed by: EMERGENCY MEDICINE

## 2018-09-14 PROCEDURE — 96365 THER/PROPH/DIAG IV INF INIT: CPT

## 2018-09-14 PROCEDURE — 70544 MR ANGIOGRAPHY HEAD W/O DYE: CPT

## 2018-09-14 PROCEDURE — 99213 OFFICE O/P EST LOW 20 MIN: CPT | Performed by: NURSE PRACTITIONER

## 2018-09-14 PROCEDURE — 99284 EMERGENCY DEPT VISIT MOD MDM: CPT

## 2018-09-14 PROCEDURE — 99219 PR INITIAL OBSERVATION CARE/DAY 50 MINUTES: CPT | Performed by: OBSTETRICS & GYNECOLOGY

## 2018-09-14 PROCEDURE — 96366 THER/PROPH/DIAG IV INF ADDON: CPT

## 2018-09-14 PROCEDURE — 80053 COMPREHEN METABOLIC PANEL: CPT | Performed by: EMERGENCY MEDICINE

## 2018-09-14 PROCEDURE — 25010000002 MAGNESIUM SULFATE 2 GM/50ML SOLUTION: Performed by: EMERGENCY MEDICINE

## 2018-09-14 PROCEDURE — 70551 MRI BRAIN STEM W/O DYE: CPT

## 2018-09-14 PROCEDURE — 93971 EXTREMITY STUDY: CPT

## 2018-09-14 PROCEDURE — 96375 TX/PRO/DX INJ NEW DRUG ADDON: CPT

## 2018-09-14 PROCEDURE — 70450 CT HEAD/BRAIN W/O DYE: CPT

## 2018-09-14 PROCEDURE — 25010000002 LORAZEPAM PER 2 MG: Performed by: EMERGENCY MEDICINE

## 2018-09-14 PROCEDURE — 81001 URINALYSIS AUTO W/SCOPE: CPT | Performed by: EMERGENCY MEDICINE

## 2018-09-14 RX ORDER — SODIUM CHLORIDE, SODIUM LACTATE, POTASSIUM CHLORIDE, CALCIUM CHLORIDE 600; 310; 30; 20 MG/100ML; MG/100ML; MG/100ML; MG/100ML
75 INJECTION, SOLUTION INTRAVENOUS CONTINUOUS
Status: DISCONTINUED | OUTPATIENT
Start: 2018-09-14 | End: 2018-09-15

## 2018-09-14 RX ORDER — MAGNESIUM SULFATE HEPTAHYDRATE 40 MG/ML
2 INJECTION, SOLUTION INTRAVENOUS CONTINUOUS
Status: DISCONTINUED | OUTPATIENT
Start: 2018-09-14 | End: 2018-09-15

## 2018-09-14 RX ORDER — ACETAMINOPHEN 325 MG/1
650 TABLET ORAL EVERY 6 HOURS PRN
Status: DISCONTINUED | OUTPATIENT
Start: 2018-09-14 | End: 2018-09-15 | Stop reason: HOSPADM

## 2018-09-14 RX ORDER — SODIUM CHLORIDE 0.9 % (FLUSH) 0.9 %
10 SYRINGE (ML) INJECTION AS NEEDED
Status: DISCONTINUED | OUTPATIENT
Start: 2018-09-14 | End: 2018-09-15

## 2018-09-14 RX ORDER — LABETALOL 200 MG/1
200 TABLET, FILM COATED ORAL EVERY 12 HOURS SCHEDULED
Status: DISCONTINUED | OUTPATIENT
Start: 2018-09-14 | End: 2018-09-15 | Stop reason: HOSPADM

## 2018-09-14 RX ORDER — ALBUTEROL SULFATE 90 UG/1
2 AEROSOL, METERED RESPIRATORY (INHALATION)
Status: DISCONTINUED | OUTPATIENT
Start: 2018-09-14 | End: 2018-09-14 | Stop reason: CLARIF

## 2018-09-14 RX ORDER — MAGNESIUM SULFATE HEPTAHYDRATE 40 MG/ML
6 INJECTION, SOLUTION INTRAVENOUS ONCE
Status: COMPLETED | OUTPATIENT
Start: 2018-09-14 | End: 2018-09-14

## 2018-09-14 RX ORDER — LORAZEPAM 2 MG/ML
1 INJECTION INTRAMUSCULAR ONCE
Status: COMPLETED | OUTPATIENT
Start: 2018-09-14 | End: 2018-09-14

## 2018-09-14 RX ORDER — ACETAMINOPHEN 500 MG
1000 TABLET ORAL EVERY 8 HOURS
Status: DISCONTINUED | OUTPATIENT
Start: 2018-09-14 | End: 2018-09-15

## 2018-09-14 RX ORDER — IBUPROFEN 800 MG/1
800 TABLET ORAL EVERY 8 HOURS PRN
Status: DISCONTINUED | OUTPATIENT
Start: 2018-09-14 | End: 2018-09-15

## 2018-09-14 RX ORDER — ALBUTEROL SULFATE 2.5 MG/3ML
2.5 SOLUTION RESPIRATORY (INHALATION)
Status: DISCONTINUED | OUTPATIENT
Start: 2018-09-14 | End: 2018-09-16 | Stop reason: HOSPADM

## 2018-09-14 RX ADMIN — MAGNESIUM SULFATE HEPTAHYDRATE 2 G/HR: 40 INJECTION, SOLUTION INTRAVENOUS at 17:09

## 2018-09-14 RX ADMIN — LORAZEPAM 1 MG: 2 INJECTION INTRAMUSCULAR; INTRAVENOUS at 16:22

## 2018-09-14 RX ADMIN — SODIUM CHLORIDE, POTASSIUM CHLORIDE, SODIUM LACTATE AND CALCIUM CHLORIDE 75 ML/HR: 600; 310; 30; 20 INJECTION, SOLUTION INTRAVENOUS at 18:10

## 2018-09-14 RX ADMIN — LABETALOL HCL 200 MG: 200 TABLET, FILM COATED ORAL at 21:32

## 2018-09-14 RX ADMIN — MAGNESIUM SULFATE IN WATER 6 G: 40 INJECTION, SOLUTION INTRAVENOUS at 15:59

## 2018-09-14 RX ADMIN — ACETAMINOPHEN 1000 MG: 500 TABLET, FILM COATED ORAL at 18:34

## 2018-09-14 NOTE — ED NOTES
Received call from Valorie in Labor and Delivery. Pt can be brought to L&D room 18 once MRI is complete.      Tanna Juárez RN  09/14/18 2123

## 2018-09-14 NOTE — PROGRESS NOTES
[facility]  Routine Annual Visit    2018    Patient: Rhina Marquez          MR#:[id number]      History of Present Illness    23 y.o. female   presents for 1 week postpartum BP check. Rhina has been treated for CHT since second trimester of this past pregnancy with labetolol 200 mg BID. Pressures were elevated pp day 1 but normalized and she was discharged pp day 2 but instructed to follow up for 1 week BP check. BP elevated today- 140/100 x 2. She reports severe headache for several days and intermittent vision changes- blurry vision and spots. Discussed with Dr. Munoz. Dr. Munoz performed a normal neuro exam but advises due to patient symptoms she be evaluated in the ER. Call made to Dr. Del Angel- on call provider.     No LMP recorded.  Obstetric History:  OB History      Para Term  AB Living    4 3 2 1 1 3    SAB TAB Ectopic Molar Multiple Live Births    1 0 0 0 0 3        Obstetric Comments    3.1.18 - 7-5 weeks by LMP - MICHAEL 10/13/18 - [facility]    5..29.18 - 20-3 weeks - 20-3 weeks - Normal but incompleted anatomy - [facility]    6.26.18 = 24-3 weeks - Normal completed anatomy, eFW 56%, aC 62% - [facility]    2018 30w2d normal interval growth: EFW 76% AC 93%  hb   8.28.18 - 33-3 weeks - EFW >90%, AC > 97% - [facility]          Menstrual History:     No LMP recorded.       Sexual History:       ________________________________________  Patient Active Problem List   Diagnosis   • Maternal varicella, non-immune   • Generalized convulsive seizures (CMS/HCC)   • Hx of preeclampsia, prior pregnancy, currently pregnant   • Obesity affecting pregnancy in second trimester   • Sciatica, left side   • Pregnancy   • Chronic hypertension affecting pregnancy   • Chronic headaches   • High-risk pregnancy supervision   • Anemia affecting pregnancy   • Excessive fetal growth affecting management of pregnancy in third trimester   • Pregnant   • Elevated blood pressure complicating pregnancy, antepartum,  third trimester   •  labor in third trimester without delivery       Past Medical History:   Diagnosis Date   • Anxiety    • Asthma    • Convulsive seizure (CMS/[facility])     Neurologic workup to date is inconclusive for seizure disorder, patient did not follow through with EEG, saw neurologist Dr. Willam Calderon   • Depression     hx when younger   • Eye infection    • Eye infection    • Mild preeclampsia    • Obstetrical complication    • Preeclampsia    • Rubella non-immune status, antepartum    • UTI in pregnancy        Past Surgical History:   Procedure Laterality Date   • EAR TUBES         History   Smoking Status   • Former Smoker   Smokeless Tobacco   • Never Used       Family History   Problem Relation Age of Onset   • Hypertension Father    • Cancer Maternal Grandmother    • Stroke Maternal Grandfather    • Diabetes Maternal Grandfather    • Migraines Maternal Grandfather    • Dementia Maternal Grandfather    • Stroke Maternal Aunt    • Diabetes Maternal Uncle    • Migraines Mother    • Arthritis Mother        Prior to Admission medications    Medication Sig Start Date End Date Taking? Authorizing Provider   ibuprofen (ADVIL,MOTRIN) 600 MG tablet Take 1 tablet by mouth Every 8 (Eight) Hours As Needed for Mild Pain . 18  Yes Abdirashid Romero MD   labetalol (NORMODYNE) 200 MG tablet Take 1 tablet by mouth Every 12 (Twelve) Hours. 18  Yes Abdirashid Romero MD   labetalol (NORMODYNE) 200 MG tablet Take 1 tablet by mouth Every 12 (Twelve) Hours. 18   Caroline Arreguin APRN   oxyCODONE-acetaminophen (PERCOCET) 5-325 MG per tablet Take 1 tablet by mouth Every 4 (Four) Hours As Needed for Severe Pain  for up to 8 days. 18  Abdirashid Romero MD   PRENATAL GUMMY VITAMIN Chew 1 each Daily.    ProviderTeresa MD   VENTOLIN  (90 Base) MCG/ACT inhaler Inhale 2 puffs Every 6 (Six) Hours As Needed. 18   ProviderTeresa MD     ________________________________________  The  "following portions of the patient's history were reviewed and updated as appropriate: allergies, current medications, past family history, past medical history, past social history, past surgical history and problem list.    Review of Systems    Pertinent items are noted in HPI.     Objective   Physical Exam   Constitutional: She is oriented to person, place, and time. She appears well-developed and well-nourished. No distress.   Pulmonary/Chest: Effort normal and breath sounds normal.   Neurological: She is alert and oriented to person, place, and time. She has normal strength and normal reflexes. No cranial nerve deficit or sensory deficit. She exhibits normal muscle tone. Coordination normal.   Psychiatric: She has a normal mood and affect. Her behavior is normal.       /100   Ht 157.5 cm (62\")   Wt 94.8 kg (209 lb)   BMI 38.23 kg/m²    BP Readings from Last 3 Encounters:   09/14/18 140/100   09/09/18 142/88   09/06/18 120/80      Wt Readings from Last 3 Encounters:   09/14/18 94.8 kg (209 lb)   09/07/18 101 kg (223 lb)   09/06/18 102 kg (224 lb)        BMI: Estimated body mass index is 38.23 kg/m² as calculated from the following:    Height as of this encounter: 157.5 cm (62\").    Weight as of this encounter: 94.8 kg (209 lb).      Assessment:    1. Elevated BP- Rhina advised to report directly to [facility] ER for further evaluation/treatment. Advised postpartum preeclampsia must be ruled out due to her elevated BPs and symptoms and BP meds need to be adjusted. She reports she and her mother were going to [facility] anyway to visit her son in the NICU. Her mother will take her directly to the ER instead.     Plan:    []  Rx:   []  Mammogram request made  []  PAP done  []  Occult fecal blood test ([facility])  []  Labs:   []  GC/Chl/TV  []  DEXA scan   []  Referral for colonoscopy:           Caroline Arreguin, JOAN  9/14/2018 11:24 AM  "

## 2018-09-14 NOTE — ED PROVIDER NOTES
EMERGENCY DEPARTMENT ENCOUNTER    CHIEF COMPLAINT  Chief Complaint: HTN  History given by: Patient   History limited by: none  Room Number: L18/1  PMD: Aneesh Coto MD      HPI:  Pt is a 23 y.o. female who presents complaining of HTN Pt was at OB's office today who sent her over here with HTN of 140s/100s. Pt states last dose labetalol 9am today. Pt denies numbness or tingling, SOA, or eating/ drinking changes. Pt reports decreased urinary frequency. Pt takes 200 Labetalol bid.  3 para 3.     Duration:  Several hours  Onset: gradual  Timing: constant  Location: NA  Radiation: NA  Quality: HTN  Intensity/Severity: moderate  Progression: unchanged  Associated Symptoms: decreased urinary frequency  Previous Episodes: Pt has Hx of HTN.  Treatment before arrival: Pt took last dose of labetalol at 9 this morning.     PAST MEDICAL HISTORY  Active Ambulatory Problems     Diagnosis Date Noted   • Maternal varicella, non-immune 2016   • Generalized convulsive seizures (CMS/HCC) 2016   • Hx of preeclampsia, prior pregnancy, currently pregnant 2018   • Obesity affecting pregnancy in second trimester 2018   • Sciatica, left side 2018   • Pregnancy 2018   • Chronic hypertension affecting pregnancy 2018   • Chronic headaches 2018   • High-risk pregnancy supervision 2018   • Anemia affecting pregnancy 2018   • Excessive fetal growth affecting management of pregnancy in third trimester 2018   • Pregnant 2018   • Elevated blood pressure complicating pregnancy, antepartum, third trimester 2018   •  labor in third trimester without delivery 2018     Resolved Ambulatory Problems     Diagnosis Date Noted   • High risk pregnancy in young multigravida, antepartum 2016   • Maternal history of obstetrical complications 2016   • Rubella non-immune status, antepartum 2016   • UTI in pregnancy 2016   • History of  pre-eclampsia in prior pregnancy, currently pregnant in third trimester 2016   • Evaluate anatomy not seen on prior sonogram 2016   • Palpitations 2016   • Pregnancy 10/20/2016   • Large for gestational age (LGA) 2016   • Hypertension affecting pregnancy in third trimester 2016   • Proteinuria affecting pregnancy in third trimester 2016   • Antepartum mild preeclampsia 2016   • GBS (group B Streptococcus carrier), +RV culture, currently pregnant 2016   • Preeclampsia 2016   •  (spontaneous vaginal delivery) 2016   • Headache in pregnancy, antepartum, second trimester 2018   • Evaluate anatomy not seen on prior sonogram 2018     Past Medical History:   Diagnosis Date   • Anxiety    • Asthma    • Convulsive seizure (CMS/HCC)    • Depression    • Eye infection    • Eye infection    • Mild preeclampsia    • Obstetrical complication    • Preeclampsia    • Rubella non-immune status, antepartum    • UTI in pregnancy        PAST SURGICAL HISTORY  Past Surgical History:   Procedure Laterality Date   • EAR TUBES         FAMILY HISTORY  Family History   Problem Relation Age of Onset   • Hypertension Father    • Cancer Maternal Grandmother    • Stroke Maternal Grandfather    • Diabetes Maternal Grandfather    • Migraines Maternal Grandfather    • Dementia Maternal Grandfather    • Stroke Maternal Aunt    • Diabetes Maternal Uncle    • Migraines Mother    • Arthritis Mother        SOCIAL HISTORY  Social History     Social History   • Marital status: Single     Spouse name: OLEGARIO   • Number of children: 2   • Years of education: 12     Occupational History   • Homemaker      Social History Main Topics   • Smoking status: Former Smoker   • Smokeless tobacco: Never Used   • Alcohol use No   • Drug use: No   • Sexual activity: Defer     Other Topics Concern   • Not on file     Social History Narrative    OB/GYN PATIENT SINCE .        ALLERGIES  Patient has no known allergies.    REVIEW OF SYSTEMS  Review of Systems   Constitutional: Negative for appetite change and fever.   HENT: Negative for sore throat.    Eyes: Negative.    Respiratory: Negative for cough and shortness of breath.    Cardiovascular: Negative for chest pain.   Gastrointestinal: Negative for abdominal pain, diarrhea and vomiting.   Genitourinary: Positive for frequency (decreased). Negative for dysuria.   Musculoskeletal: Negative for neck pain.   Skin: Negative for rash.   Allergic/Immunologic: Negative.    Neurological: Negative for weakness, numbness and headaches.   Hematological: Negative.    Psychiatric/Behavioral: Negative.    All other systems reviewed and are negative.      PHYSICAL EXAM  ED Triage Vitals   Temp Heart Rate Resp BP SpO2   09/14/18 1340 09/14/18 1340 09/14/18 1401 09/14/18 1401 09/14/18 1340   98.8 °F (37.1 °C) 96 16 138/95 99 %      Temp src Heart Rate Source Patient Position BP Location FiO2 (%)   09/14/18 1340 09/14/18 1340 09/14/18 1401 09/14/18 1401 --   Tympanic Monitor Sitting Right arm        Physical Exam   Constitutional: She is oriented to person, place, and time. No distress.   HENT:   Head: Normocephalic and atraumatic.   Eyes: Pupils are equal, round, and reactive to light. EOM are normal.   Neck: Normal range of motion. Neck supple.   Cardiovascular: Normal rate, regular rhythm and normal heart sounds.    Pulmonary/Chest: Effort normal and breath sounds normal. No respiratory distress.   Abdominal: Soft. There is no tenderness. There is no rebound and no guarding.   Musculoskeletal: Normal range of motion. She exhibits no edema (lower extremity).   Neurological: She is alert and oriented to person, place, and time. She has normal sensation and normal strength.   Neurologically intact   Skin: Skin is warm and dry. No rash noted.   Psychiatric: Mood and affect normal.   Nursing note and vitals reviewed.      LAB RESULTS  Lab Results  (last 24 hours)     Procedure Component Value Units Date/Time    POC Urinalysis Dipstick [[id number]]  (Abnormal) Collected:  09/14/18 1036    Specimen:  Urine Updated:  09/14/18 1037     Color Yellow     Clarity, UA Clear     Glucose, UA Negative mg/dL      Bilirubin Small (1+) (A)     Ketones, UA Negative     Specific Gravity  1.030     Blood, UA Large (A)     pH, Urine 6.0     Protein,  mg/dL (A) mg/dL      Urobilinogen, UA Normal     Leukocytes Trace (A)     Nitrite, UA Negative    CBC & Differential [[id number]] Collected:  09/14/18 1426    Specimen:  Blood Updated:  09/14/18 1444    Narrative:       The following orders were created for panel order CBC & Differential.  Procedure                               Abnormality         Status                     ---------                               -----------         ------                     CBC Auto Differential[[id number]]        Abnormal            Final result                 Please view results for these tests on the individual orders.    Comprehensive Metabolic Panel [[id number]]  (Abnormal) Collected:  09/14/18 1426    Specimen:  Blood Updated:  09/14/18 1507     Glucose 104 (H) mg/dL      BUN 18 mg/dL      Creatinine 0.56 (L) mg/dL      Sodium 143 mmol/L      Potassium 3.9 mmol/L      Chloride 107 mmol/L      CO2 25.8 mmol/L      Calcium 9.2 mg/dL      Total Protein 6.5 g/dL      Albumin 3.80 g/dL      ALT (SGPT) 18 U/L      AST (SGOT) 13 U/L      Alkaline Phosphatase 96 U/L      Total Bilirubin 0.2 mg/dL      eGFR Non African Amer 137 mL/min/1.73      Globulin 2.7 gm/dL      A/G Ratio 1.4 g/dL      BUN/Creatinine Ratio 32.1 (H)     Anion Gap 10.2 mmol/L     Magnesium [[id number]]  (Normal) Collected:  09/14/18 1426    Specimen:  Blood Updated:  09/14/18 1505     Magnesium 2.0 mg/dL     CBC Auto Differential [[id number]]  (Abnormal) Collected:  09/14/18 1426    Specimen:  Blood Updated:  09/14/18 1444     WBC 11.67 (H) 10*3/mm3      RBC 4.45 10*6/mm3       Hemoglobin 11.6 (L) g/dL      Hematocrit 37.9 %      MCV 85.2 fL      MCH 26.1 (L) pg      MCHC 30.6 (L) g/dL      RDW 13.4 (H) %      RDW-SD 41.1 fl      MPV 10.3 fL      Platelets 388 10*3/mm3      Neutrophil % 67.7 %      Lymphocyte % 22.1 %      Monocyte % 5.3 %      Eosinophil % 3.7 %      Basophil % 0.3 %      Immature Grans % 0.9 (H) %      Neutrophils, Absolute 7.91 10*3/mm3      Lymphocytes, Absolute 2.58 10*3/mm3      Monocytes, Absolute 0.62 10*3/mm3      Eosinophils, Absolute 0.43 10*3/mm3      Basophils, Absolute 0.03 10*3/mm3      Immature Grans, Absolute 0.10 (H) 10*3/mm3     Urinalysis With Microscopic If Indicated (No Culture) - Urine, Clean Catch [[id number]]  (Abnormal) Collected:  09/14/18 1437    Specimen:  Urine from Urine, Clean Catch Updated:  09/14/18 1455     Color, UA Dark Yellow (A)     Appearance, UA Cloudy (A)     pH, UA 6.0     Specific Gravity, UA >=1.030     Glucose, UA Negative     Ketones, UA Negative     Bilirubin, UA Negative     Blood, UA Large (3+) (A)     Protein, UA 30 mg/dL (1+) (A)     Leuk Esterase, UA Moderate (2+) (A)     Nitrite, UA Negative     Urobilinogen, UA 1.0 E.U./dL    Urinalysis, Microscopic Only - Urine, Clean Catch [[id number]]  (Abnormal) Collected:  09/14/18 1437    Specimen:  Urine from Urine, Clean Catch Updated:  09/14/18 1455     RBC, UA Too Numerous to Count (A) /HPF      WBC, UA Too Numerous to Count (A) /HPF      Bacteria, UA None Seen /HPF      Squamous Epithelial Cells, UA 0-2 /HPF      Hyaline Casts, UA 7-12 /LPF      Methodology Automated Microscopy          I ordered the above labs and reviewed the results    RADIOLOGY  MRI Brain Without Contrast   Final Result           1. No acute infarct. Normal signal intensity of the brain on the   unenhanced exam.   2. Abnormal increased height of the pituitary, follow-up evaluation is   advised, as described.   3. No dural venous sinus thrombosis is identified. Appearance of signal   void in the left  internal jugular vein,  suspected to be artifactual,   but potentially evidence of thrombus in the left internal jugular vein,   suggest further characterization of the left internal jugular vein with   ultrasound or CT venography.       Discussed by telephone with nurse Valorie for Dr. Davis at 1821,   9/14/2018.       This report was finalized on 9/14/2018 6:27 PM by Dr. Wilfredo Samaniego M.D.          MRI Angiogram Venogram Head   Final Result           1. No acute infarct. Normal signal intensity of the brain on the   unenhanced exam.   2. Abnormal increased height of the pituitary, follow-up evaluation is   advised, as described.   3. No dural venous sinus thrombosis is identified. Appearance of signal   void in the left internal jugular vein,  suspected to be artifactual,   but potentially evidence of thrombus in the left internal jugular vein,   suggest further characterization of the left internal jugular vein with   ultrasound or CT venography.       Discussed by telephone with nurse Valorie for Dr. Davis at 1821,   9/14/2018.       This report was finalized on 9/14/2018 6:27 PM by Dr. Wilfredo Samaniego M.D.          CT Head Without Contrast   Final Result       Although there is no convincing evidence for acute intracranial   pathology on this head CT, the history is certainly worrisome for a   posterior reversible encephalopathy syndrome. This could potentially be   occult on CT imaging and I would recommend performing an MRI of the   brain without contrast for further evaluation.       These findings and recommendations were discussed with Dr. Davis on   09/14/2018 at approximately 3:23 PM.       Radiation dose reduction techniques were utilized, including automated   exposure control and exposure modulation based on body size.       This report was finalized on 9/14/2018 4:57 PM by Dr. Ger Lomax M.D.               I ordered the above noted radiological studies. Interpreted by  radiologist. Discussed with radiologist (Dr. Samaniego). Reviewed by me in PACS.       PROCEDURES  Procedures      PROGRESS AND CONSULTS       1410   Blood pressure = 139/92    1412  Ordered lab work and CT Head.     1527  Discussed the pt's case with Dr. Del Angel, [facility] who recommends to give pt 6 g of mag and start drip at 2 g an hour. Agrees to admit after MRI done.     1530  Pt recheck. Notified pt of discussion with Dr. Del Angel. Discussed with pt the plan to admit. Pt understands and agrees with treatment plan. All concerns addressed.     1533  Discussed the pt's CT Head that shows no acute findings with Dr. Lomax, Radiology. Recommends MRI and MRV of brain.    1526  Pt recheck. Notified pt of negative CT Head results. Discussed with pt the plan to further evaluate with MRI and MRV of brain. Pt is agreeable.     1830  Discussed with Dr. Samaniego, Radiology the pt's imaging results. MRI brain shows brain normal, pituitary enlarged and signal void in left iternal jugular vein cant exlcude rhombus. Needs further workup. MRV negative    1836  MRI results relayed to Dr. Del Angel.    MEDICAL DECISION MAKING  Results were reviewed/discussed with the patient and they were also made aware of online access. Pt also made aware that some labs, such as cultures, will not be resulted during ER visit and follow up with PMD is necessary.     MDM  Number of Diagnoses or Management Options  Pre-eclampsia in postpartum period:      Amount and/or Complexity of Data Reviewed  Clinical lab tests: ordered and reviewed (UA dark yellow and cloudy. TNTC RBC and WBC)  Tests in the radiology section of CPT®: ordered and reviewed (CT Head shows no acute findings. MRI brain shows brain normal, pituitary enlarged and signal void in left iternal jugular vein cant exlcude rhombus. Needs further workup. MRV negative)  Decide to obtain previous medical records or to obtain history from someone other than the patient: yes  Discuss the patient with other  providers: yes (Dr. Samaniego Radiology Dr. Del Angel, [facility])           DIAGNOSIS  Final diagnoses:   Pre-eclampsia in postpartum period       DISPOSITION  ADMISSION    Discussed treatment plan and reason for admission with pt/family and admitting physician.  Pt/family voiced understanding of the plan for admission for further testing/treatment as needed.         Latest Documented Vital Signs:  As of 7:01 PM  BP- 146/83 HR- 79 Temp- 98.8 °F (37.1 °C) (Tympanic) O2 sat- 100%    --  Documentation assistance provided by mima Farias for Dr. Davis.  Information recorded by the scribe was done at my direction and has been verified and validated by me.                Willam Farias  09/14/18 1902       Tony Davis MD  09/14/18 1918

## 2018-09-14 NOTE — ED NOTES
Pt is being monitored in MRI by ROVERTO Tony . This RN informed Cecily to transport pt to L&D when MRI is complete. Will inform pt's family.      Tanna Juárez RN  09/14/18 1640

## 2018-09-14 NOTE — H&P
[facility]  Obstetric History and Physical    Chief Complaint   Patient presents with   • Hypertension     pt delivered baby 7 days ago. pt went to ob appt today and vishal 140s/100s. pt reports hypertension during pregnancy and started bp meds at 21 weeks. pt takes labetalol       Subjective     Patient is a 23 y.o. female  currently PPD # 7 s/p  of LVMI at 34-6 weeks who presented to the ER today with severe headache since yesterday and visual changes with elevated blood pressures.  Patient was on Labetalol 200mg BID throughout her pregnancy for CHTN.  Ct Scan and MRI performed in the ER were negative other than possible thrombosis of left jugular vs artifact.         Her previous obstetric/gynecological history is noted for is remarkable for history of preeclampsia.     The following portions of the patients history were reviewed and updated as appropriate: current medications, allergies, past medical history, past surgical history, past family history, past social history and problem list .                 Past OB History:     Obstetric History       T2      L3     SAB1   TAB0   Ectopic0   Molar0   Multiple0   Live Births3       # Outcome Date GA Lbr Avinash/2nd Weight Sex Delivery Anes PTL Lv   4  18 34w6d 03:03 / 00:16 3162 g (6 lb 15.5 oz) M Vag-Spont EPI Y JOAN      Name: XENIA LIZ      Apgar1:  8                Apgar5: 9   3 Term /16 37w0d 03:02 / 00:13 3756 g (8 lb 4.5 oz) F Vag-Spont EPI N JOAN      Name: LUIS LIZ      Apgar1:  9                Apgar5: 9   2 Term 11/05/15 37w0d  2906 g (6 lb 6.5 oz) F Vag-Spont   JOAN      Name: CINTHIA      Complications: Preeclampsia      Apgar1:  8                Apgar5: 9   1 2014              Obstetric Comments   3.1.18 - 7-5 weeks by LMP - MICHAEL 10/13/18 - [facility]     5..29.18 - 20-3 weeks - 20-3 weeks - Normal but incompleted anatomy - [facility]     6..18 = 24-3 weeks - Normal completed anatomy, eFW 56%, aC 62% - [facility]      8/6/2018 30w2d normal interval growth: EFW 76% AC 93% hb    8.28.18 - 33-3 weeks - EFW >90%, AC > 97% - [facility]        Past Medical History: Past Medical History:   Diagnosis Date   • Anxiety    • Asthma    • Convulsive seizure (CMS/HCC) 2015    Neurologic workup to date is inconclusive for seizure disorder, patient did not follow through with EEG, saw neurologist Dr. Willam Calderon   • Depression     hx when younger   • Eye infection 2000   • Eye infection    • Mild preeclampsia 2016   • Obstetrical complication    • Preeclampsia 2015   • Rubella non-immune status, antepartum    • UTI in pregnancy       Past Surgical History Past Surgical History:   Procedure Laterality Date   • EAR TUBES        Family History: Family History   Problem Relation Age of Onset   • Hypertension Father    • Cancer Maternal Grandmother    • Stroke Maternal Grandfather    • Diabetes Maternal Grandfather    • Migraines Maternal Grandfather    • Dementia Maternal Grandfather    • Stroke Maternal Aunt    • Diabetes Maternal Uncle    • Migraines Mother    • Arthritis Mother       Social History:  reports that she has quit smoking. She has never used smokeless tobacco.   reports that she does not drink alcohol.   reports that she does not use drugs.        Review of Systems      Objective     Vital Signs Range for the last 24 hours  Temperature: Temp:  [98.8 °F (37.1 °C)] 98.8 °F (37.1 °C)   Temp Source: Temp src: Tympanic   BP: BP: (138-165)/() 144/70   Pulse: Heart Rate:  [60-96] 70   Respirations: Resp:  [15-16] 16   SPO2: SpO2:  [92 %-100 %] 99 %   O2 Amount (l/min):     O2 Devices Device (Oxygen Therapy): room air   Weight: Weight:  [93.9 kg (207 lb)-96 kg (211 lb 11.2 oz)] 93.9 kg (207 lb)     Physical Examination: General appearance - alert, well appearing, and in no distress  Chest - clear to auscultation, no wheezes, rales or rhonchi, symmetric air entry  Heart - normal rate, regular rhythm, normal S1, S2, no murmurs, rubs,  clicks or gallops  Musculoskeletal - no joint tenderness, deformity or swelling  Extremities - 2 + DTR's, no clonus, peripheral pulses normal, no pedal edema, no clubbing or cyanosis  Skin - normal coloration and turgor, no rashes, no suspicious skin lesions noted      Laboratory Results:     Results from last 7 days  Lab Units 09/14/18  1426   WBC 10*3/mm3 11.67*   HEMOGLOBIN g/dL 11.6*   HEMATOCRIT % 37.9   PLATELETS 10*3/mm3 388       Results from last 7 days  Lab Units 09/14/18  1426   SODIUM mmol/L 143   POTASSIUM mmol/L 3.9   CHLORIDE mmol/L 107   CO2 mmol/L 25.8   BUN mg/dL 18   CREATININE mg/dL 0.56*   CALCIUM mg/dL 9.2   BILIRUBIN mg/dL 0.2   ALK PHOS U/L 96   ALT (SGPT) U/L 18   AST (SGOT) U/L 13   GLUCOSE mg/dL 104*     MRI examination of the brain:    1. No acute infarct. Normal signal intensity of the brain on the  unenhanced exam.  2. Abnormal increased height of the pituitary, follow-up evaluation is  advised, as described.  3. No dural venous sinus thrombosis is identified. Appearance of signal  void in the left internal jugular vein,  suspected to be artifactual,  but potentially evidence of thrombus in the left internal jugular vein,  suggest further characterization of the left internal jugular vein with  ultrasound or CT venography.    Assessment/Plan     Active Problems:    Pre-eclampsia in postpartum period      Assessment & Plan    Assessment/Plan   1.  Postpartum Preeclampsia - on Mag - cont x 24 hours, cont Labetalol 200 mg BID   2.  Mri finding of possible internal jugular - doppler studies pending   3. Enlarged pituitary on MRI - will follow-up as outpatient      4. Plan of care has been reviewed with patient and mother   3.  Risks, benefits of treatment plan have been discussed.  4.  All questions have been answered.        Anali Del Angel MD  9/14/2018  7:34 PM

## 2018-09-14 NOTE — ED NOTES
MRI screening sheet completed and results called to Yana in MRI     Valorie Campoverde RN  09/14/18 [id number]

## 2018-09-15 LAB
BH CV UPPER VENOUS LEFT AXILLARY AUGMENT: NORMAL
BH CV UPPER VENOUS LEFT AXILLARY COMPETENT: NORMAL
BH CV UPPER VENOUS LEFT AXILLARY COMPRESS: NORMAL
BH CV UPPER VENOUS LEFT AXILLARY PHASIC: NORMAL
BH CV UPPER VENOUS LEFT AXILLARY SPONT: NORMAL
BH CV UPPER VENOUS LEFT BASILIC FOREARM COMPRESS: NORMAL
BH CV UPPER VENOUS LEFT BASILIC UPPER COMPRESS: NORMAL
BH CV UPPER VENOUS LEFT BRACHIAL COMPRESS: NORMAL
BH CV UPPER VENOUS LEFT CEPHALIC FOREARM COMPRESS: NORMAL
BH CV UPPER VENOUS LEFT CEPHALIC UPPER COMPRESS: NORMAL
BH CV UPPER VENOUS LEFT INTERNAL JUGULAR AUGMENT: NORMAL
BH CV UPPER VENOUS LEFT INTERNAL JUGULAR COMPETENT: NORMAL
BH CV UPPER VENOUS LEFT INTERNAL JUGULAR COMPRESS: NORMAL
BH CV UPPER VENOUS LEFT INTERNAL JUGULAR PHASIC: NORMAL
BH CV UPPER VENOUS LEFT INTERNAL JUGULAR SPONT: NORMAL
BH CV UPPER VENOUS LEFT RADIAL COMPRESS: NORMAL
BH CV UPPER VENOUS LEFT SUBCLAVIAN AUGMENT: NORMAL
BH CV UPPER VENOUS LEFT SUBCLAVIAN COMPETENT: NORMAL
BH CV UPPER VENOUS LEFT SUBCLAVIAN COMPRESS: NORMAL
BH CV UPPER VENOUS LEFT SUBCLAVIAN PHASIC: NORMAL
BH CV UPPER VENOUS LEFT SUBCLAVIAN SPONT: NORMAL
BH CV UPPER VENOUS LEFT ULNAR COMPRESS: NORMAL
BH CV UPPER VENOUS RIGHT INTERNAL JUGULAR AUGMENT: NORMAL
BH CV UPPER VENOUS RIGHT INTERNAL JUGULAR COMPETENT: NORMAL
BH CV UPPER VENOUS RIGHT INTERNAL JUGULAR COMPRESS: NORMAL
BH CV UPPER VENOUS RIGHT INTERNAL JUGULAR PHASIC: NORMAL
BH CV UPPER VENOUS RIGHT INTERNAL JUGULAR SPONT: NORMAL
BH CV UPPER VENOUS RIGHT SUBCLAVIAN AUGMENT: NORMAL
BH CV UPPER VENOUS RIGHT SUBCLAVIAN COMPETENT: NORMAL
BH CV UPPER VENOUS RIGHT SUBCLAVIAN COMPRESS: NORMAL
BH CV UPPER VENOUS RIGHT SUBCLAVIAN PHASIC: NORMAL
BH CV UPPER VENOUS RIGHT SUBCLAVIAN SPONT: NORMAL

## 2018-09-15 PROCEDURE — 96366 THER/PROPH/DIAG IV INF ADDON: CPT

## 2018-09-15 PROCEDURE — 99225 PR SBSQ OBSERVATION CARE/DAY 25 MINUTES: CPT | Performed by: OBSTETRICS & GYNECOLOGY

## 2018-09-15 PROCEDURE — G0378 HOSPITAL OBSERVATION PER HR: HCPCS

## 2018-09-15 RX ORDER — IBUPROFEN 600 MG/1
600 TABLET ORAL EVERY 6 HOURS PRN
Status: DISCONTINUED | OUTPATIENT
Start: 2018-09-15 | End: 2018-09-16 | Stop reason: HOSPADM

## 2018-09-15 RX ORDER — LABETALOL 200 MG/1
200 TABLET, FILM COATED ORAL EVERY 12 HOURS SCHEDULED
Status: DISCONTINUED | OUTPATIENT
Start: 2018-09-15 | End: 2018-09-16

## 2018-09-15 RX ADMIN — IBUPROFEN 600 MG: 600 TABLET ORAL at 11:22

## 2018-09-15 RX ADMIN — ACETAMINOPHEN 1000 MG: 500 TABLET, FILM COATED ORAL at 03:23

## 2018-09-15 RX ADMIN — LABETALOL HCL 200 MG: 200 TABLET, FILM COATED ORAL at 21:20

## 2018-09-15 RX ADMIN — IBUPROFEN 600 MG: 600 TABLET ORAL at 17:09

## 2018-09-15 RX ADMIN — LABETALOL HCL 200 MG: 200 TABLET, FILM COATED ORAL at 09:05

## 2018-09-15 RX ADMIN — SODIUM CHLORIDE, POTASSIUM CHLORIDE, SODIUM LACTATE AND CALCIUM CHLORIDE 75 ML/HR: 600; 310; 30; 20 INJECTION, SOLUTION INTRAVENOUS at 10:16

## 2018-09-15 NOTE — PROGRESS NOTES
Left upper extremity venous doppler completed, Prelim negative for DVT given to ROVERTO Balderrama.

## 2018-09-15 NOTE — NURSING NOTE
At bedside to assist with bedpan. Pt is visibly upset amd appears frustrated. She currently rates her pain of headache 3/10, but is asking why she cant get it to go away. I explained hormonal changes after delivery as well as stress of baby in NICU and having two other small children at home and not sleeping can all be potential factors in headache. I reasurred her that we have competed numerous test (MRI...) that ruled out more serious problems and now we need to focus on rest and treating her BP's. I encouraged pt to get comfortable in bed, assisted her with pillow placement. Pt requested SCD's off for a short while. I wikl recheck pt at 0900, but encouraged her to sleep and not look at phone for a full hour to see if that helps.

## 2018-09-15 NOTE — PLAN OF CARE
Problem: Skin Injury Risk (Adult)  Goal: Identify Related Risk Factors and Signs and Symptoms  Outcome: Ongoing (interventions implemented as appropriate)   09/15/18 0636   Skin Injury Risk (Adult)   Related Risk Factors (Skin Injury Risk) mobility impaired     Goal: Skin Health and Integrity  Outcome: Ongoing (interventions implemented as appropriate)   09/15/18 0636   Skin Injury Risk (Adult)   Skin Health and Integrity making progress toward outcome       Problem: Fall Risk,  (Adult,Obstetrics,Pediatric)  Goal: Identify Related Risk Factors and Signs and Symptoms  Outcome: Ongoing (interventions implemented as appropriate)   09/15/18 0636   Fall Risk,  (Adult,Obstetrics,Pediatric)   Related Risk Factors (Fall Risk, ) medication side effects   Signs and Symptoms (Fall Risk, ) presence of fall risk factors     Goal: Absence of Maternal Fall  Outcome: Ongoing (interventions implemented as appropriate)   09/15/18 0636   Fall Risk,  (Adult,Obstetrics,Pediatric)   Absence of Maternal Fall making progress toward outcome     Goal: Absence of  Fall/Drop  Outcome: Ongoing (interventions implemented as appropriate)   09/15/18 0636   Fall Risk,  (Adult,Obstetrics,Pediatric)   Absence of  Fall/Drop unable to achieve outcome  (baby in NICU)       Problem: Hypertensive Disorders in Pregnancy (Adult,Obstetrics,Pediatric)  Goal: Signs and Symptoms of Listed Potential Problems Will be Absent, Minimized or Managed (Hypertensive Disorders in Pregnancy)  Outcome: Ongoing (interventions implemented as appropriate)   09/15/18 0636   Goal/Outcome Evaluation   Problems Assessed (Hypertensive Disorders in Pregnancy) all   Problems Present (Hypertensive/in PG) other (see comments)  (headaches)       Problem: Patient Care Overview  Goal: Plan of Care Review  Outcome: Outcome(s) achieved Date Met: 09/15/18   09/15/18 0636   Coping/Psychosocial   Plan of Care Reviewed With  patient;mother   Plan of Care Review   Progress no change   OTHER   Outcome Summary BPs lower, still having headaches     Goal: Individualization and Mutuality  Outcome: Ongoing (interventions implemented as appropriate)   09/15/18 0636   Individualization   Patient Specific Preferences pumping - breastpump in room   Patient Specific Goals (Include Timeframe) BPs lower, pt able to rest   Patient Specific Interventions Blood pressure medicine, Tylenol for headaches, quiet environment   Mutuality/Individual Preferences   What Anxieties, Fears, Concerns, or Questions Do You Have About Your Care? IV sticks - pt has been stuck numeroud times with infiltrated sites   What Information Would Help Us Give You More Personalized Care? baby is 8 days old and in NICU   How Would You and/or Your Support Person Like to Participate in Your Care? be part of discussion with plan of care   Mutuality/Individual Preferences   How to Address Anxieties/Fears discuss plan of care     Goal: Discharge Needs Assessment  Outcome: Ongoing (interventions implemented as appropriate)   09/15/18 0636   Discharge Needs Assessment   Readmission Within the Last 30 Days other (see comments)  (7 days postpartum, BP problems, headache)   Concerns to be Addressed denies needs/concerns at this time   Patient/Family Anticipates Transition to home   Patient/Family Anticipated Services at Transition none   Transportation Concerns car, none   Transportation Anticipated family or friend will provide   Anticipated Changes Related to Illness none   Equipment Needed After Discharge none   Disability   Equipment Currently Used at Home none     Goal: Interprofessional Rounds/Family Conf  Outcome: Ongoing (interventions implemented as appropriate)   09/15/18 0636   Interdisciplinary Rounds/Family Conf   Participants family;nursing;patient;physician

## 2018-09-15 NOTE — PROGRESS NOTES
ROUNDING NOTE     Admission date 2018     Patient: Rhina Marquez MRN: [id number]   YOB: 1995 Age: 23 y.o. Sex: female     Chief Complaint   Patient presents with   • Hypertension     pt delivered baby 7 days ago. pt went to ob appt today and vishal 140s/100s. pt reports hypertension during pregnancy and started bp meds at 21 weeks. pt takes labetalol       HPI:    Rhina Marquez is a 23 y.o.,  AT Unknown admitted for treatment of postpartum preeclampsia. She was started on magnesium sulfate which will be scheduled for discontinuation later this afternoon. Subsequently blood pressures have decreased. She has a history of chronic headaches and complained of worsening headache upon admission. MRI was negative. She states otherwise she feels better than upon admission    Past Medical History:   Diagnosis Date   • Anxiety    • Asthma    • Convulsive seizure (CMS/[facility])     Neurologic workup to date is inconclusive for seizure disorder, patient did not follow through with EEG, saw neurologist Dr. Willam Calderon   • Depression     hx when younger   • Eye infection    • Eye infection    • Mild preeclampsia    • Obstetrical complication    • Preeclampsia    • Rubella non-immune status, antepartum    • UTI in pregnancy      Past Surgical History:   Procedure Laterality Date   • EAR TUBES       No current facility-administered medications on file prior to encounter.      Current Outpatient Prescriptions on File Prior to Encounter   Medication Sig Dispense Refill   • ibuprofen (ADVIL,MOTRIN) 600 MG tablet Take 1 tablet by mouth Every 8 (Eight) Hours As Needed for Mild Pain . 30 tablet 0   • labetalol (NORMODYNE) 200 MG tablet Take 1 tablet by mouth Every 12 (Twelve) Hours. 60 tablet 3   • VENTOLIN  (90 Base) MCG/ACT inhaler Inhale 2 puffs Every 6 (Six) Hours As Needed.     • labetalol (NORMODYNE) 200 MG tablet Take 1 tablet by mouth Every 12 (Twelve) Hours. 60 tablet 1   •  "oxyCODONE-acetaminophen (PERCOCET) 5-325 MG per tablet Take 1 tablet by mouth Every 4 (Four) Hours As Needed for Severe Pain  for up to 8 days. 15 tablet 0   • PRENATAL GUMMY VITAMIN Chew 1 each Daily.         ROS:      Except as outlined in history of physical illness, patient denies any changes in her GYN, , GI systems. All other systems reviewed are negative.      OBJECTIVE:     Vitals:   Vitals:    09/15/18 0815 09/15/18 0830 09/15/18 0845 09/15/18 0900   BP:       Pulse: 64 75 67 64   Resp:    16   Temp:       TempSrc:       SpO2: 99% 99% 99% 99%   Weight:       Height:             Appearance/Psychiatric: In no distress   Constitutional: The patient is well nourished   Cardiovascular: She does not have edema. Heart RRR  Respiratory: Respiratory effort is normal. CTAB   Abdomen: Soft, positive bowel sounds no rebound no guarding  Ext: nontender, and plus edema. +2/4 bilateral patellar reflexes   Cx; deferred      Patient Active Problem List    Diagnosis   • Pre-eclampsia in postpartum period [O14.95]   • Pituitary gland enlarged (CMS/HCC) [E23.6]   • Chronic hypertension affecting pregnancy [O10.919]     Overview Note:     Not previously treated for HTN but elevations noted in 2nd trimester. Hx gestationalal HTN    Baseline 24 hr protein= 165         • Chronic headaches [R51]     Overview Note:     Seeing neurology 6/11/18     • Obesity affecting pregnancy in second trimester [O99.212]   • Hx of preeclampsia, prior pregnancy, currently pregnant [O09.299]   • Generalized convulsive seizures (CMS/HCC) [R56.9]     Overview Note:     Patient has seen a neurologist and he thought that these were anxiety related episodes and requested the patient see a cardiologist.  She only has this\" seizure-like activity\" during her pregnancies.         LOS: 0 days    Willam Hutchins MD   September 15, 2018    Assessment and Plan: Patient appears to be improving since admission. Will continue with mag sulfate for postpartum " preeclampsia, day 8. Labetalol at 200 mg twice a day ordered. Have discussed MRI findings of internal jugular was negative Doppler studies. Enlargement of pituitary will need to be followed up as outpatient and this is been discussed with patient as well.

## 2018-09-16 VITALS
TEMPERATURE: 98.5 F | OXYGEN SATURATION: 99 % | RESPIRATION RATE: 18 BRPM | BODY MASS INDEX: 38.46 KG/M2 | HEIGHT: 62 IN | DIASTOLIC BLOOD PRESSURE: 82 MMHG | HEART RATE: 70 BPM | WEIGHT: 209 LBS | SYSTOLIC BLOOD PRESSURE: 135 MMHG

## 2018-09-16 PROCEDURE — G0378 HOSPITAL OBSERVATION PER HR: HCPCS

## 2018-09-16 PROCEDURE — 99217 PR OBSERVATION CARE DISCHARGE MANAGEMENT: CPT | Performed by: OBSTETRICS & GYNECOLOGY

## 2018-09-16 RX ORDER — LABETALOL 200 MG/1
200 TABLET, FILM COATED ORAL EVERY 8 HOURS SCHEDULED
Status: DISCONTINUED | OUTPATIENT
Start: 2018-09-16 | End: 2018-09-16

## 2018-09-16 RX ORDER — LABETALOL 200 MG/1
200 TABLET, FILM COATED ORAL EVERY 8 HOURS SCHEDULED
Qty: 90 TABLET | Refills: 1 | Status: SHIPPED | OUTPATIENT
Start: 2018-09-16 | End: 2020-07-22

## 2018-09-16 RX ORDER — LABETALOL 200 MG/1
200 TABLET, FILM COATED ORAL EVERY 8 HOURS SCHEDULED
Status: DISCONTINUED | OUTPATIENT
Start: 2018-09-16 | End: 2018-09-16 | Stop reason: HOSPADM

## 2018-09-16 RX ADMIN — LABETALOL HCL 200 MG: 200 TABLET, FILM COATED ORAL at 09:00

## 2018-09-16 NOTE — DISCHARGE SUMMARY
2018    Name:Rhina Marquez   MR#:[id number]    Vaginal Delivery Progress Note    HD#0    Subjective   Postpartum Day 9: 23 y.o. yo Female  who was readmitted for postpartum preeclampsia.  The patient received magnesium and had an adequate diuresis and some improvement in her blood pressures.  The patient had a persisting moderate to severe headache.  Her past medical history is complicated by chronic headaches.  MRI performed shows normal findings.  The patient continues to have some mild labile blood pressures but is feeling well today and requests discharge       Patient Active Problem List   Diagnosis   • Generalized convulsive seizures (CMS/HCC)   • Hx of preeclampsia, prior pregnancy, currently pregnant   • Obesity affecting pregnancy in second trimester   • Chronic hypertension affecting pregnancy   • Chronic headaches   • Pre-eclampsia in postpartum period   • Pituitary gland enlarged (CMS/HCC)       Objective   Vital Signs Range for the last 24 hours  Temp: Temp:  [97.1 °F (36.2 °C)-98.6 °F (37 °C)] 98.5 °F (36.9 °C) Temp src: Oral   BP: BP: (118-156)/(71-96) 138/84        Pulse: Heart Rate:  [54-94] 63  RR: Resp:  [16-18] 18  Weight: 94.8 kg (209 lb)  BMI:  Body mass index is 38.23 kg/m².      Lab Results   Component Value Date    WBC 11.67 (H) 2018    HGB 11.6 (L) 2018    HCT 37.9 2018    MCV 85.2 2018     2018     ROS  Gen:  Negative  Neuro:  No Headache or visual changes   GI:  Negative  Pul:  Negative     Physical Exam  General:  no acute distress, appears well   Abdomen: abdomen is soft without significant tenderness, masses, organomegaly or guarding. Fundus: Firm with scant lochia  Extremities: no cyanosis, and 1+ edema, no CT, DTR trace     Perineum:  Intact    Assessment/Plan   1.  PPD# 9  2.  Readmission for postpartum preeclampsia-improved  3.  History of chronic headaches-improved  4.  History of seizure disorder-stable      Plan:  Routine  Postpartum care      Carlo Sanderson MD  9/16/2018 8:44 AM

## 2018-09-16 NOTE — PLAN OF CARE
Problem: Hypertensive Disorders in Pregnancy (Adult,Obstetrics,Pediatric)  Goal: Signs and Symptoms of Listed Potential Problems Will be Absent, Minimized or Managed (Hypertensive Disorders in Pregnancy)  Outcome: Ongoing (interventions implemented as appropriate)      Problem: Patient Care Overview  Goal: Plan of Care Review  Outcome: Ongoing (interventions implemented as appropriate)

## 2020-06-30 ENCOUNTER — TELEPHONE (OUTPATIENT)
Dept: OBSTETRICS AND GYNECOLOGY | Age: 25
End: 2020-06-30

## 2020-06-30 NOTE — TELEPHONE ENCOUNTER
Patient called and states she is having migraines that is causing her vision loss.  Spoke with Dr. Del Angel and she said to that these headaches would not be due to early pregnancy.  Advised patient to go to ER if they continue or get worse.

## 2020-07-22 ENCOUNTER — OFFICE VISIT (OUTPATIENT)
Dept: OBSTETRICS AND GYNECOLOGY | Age: 25
End: 2020-07-22

## 2020-07-22 ENCOUNTER — PROCEDURE VISIT (OUTPATIENT)
Dept: OBSTETRICS AND GYNECOLOGY | Age: 25
End: 2020-07-22

## 2020-07-22 VITALS
HEIGHT: 62 IN | SYSTOLIC BLOOD PRESSURE: 152 MMHG | DIASTOLIC BLOOD PRESSURE: 94 MMHG | BODY MASS INDEX: 42.33 KG/M2 | WEIGHT: 230 LBS

## 2020-07-22 DIAGNOSIS — O36.80X0 ENCOUNTER TO DETERMINE FETAL VIABILITY OF PREGNANCY, SINGLE OR UNSPECIFIED FETUS: Primary | ICD-10-CM

## 2020-07-22 DIAGNOSIS — Z13.89 SCREENING FOR BLOOD OR PROTEIN IN URINE: ICD-10-CM

## 2020-07-22 DIAGNOSIS — O10.919 CHRONIC HYPERTENSION AFFECTING PREGNANCY: ICD-10-CM

## 2020-07-22 DIAGNOSIS — Z34.81 ENCOUNTER FOR SUPERVISION OF OTHER NORMAL PREGNANCY IN FIRST TRIMESTER: Primary | ICD-10-CM

## 2020-07-22 LAB
BILIRUB BLD-MCNC: ABNORMAL MG/DL
CLARITY, POC: ABNORMAL
COLOR UR: ABNORMAL
GLUCOSE UR STRIP-MCNC: NEGATIVE MG/DL
KETONES UR QL: ABNORMAL
LEUKOCYTE EST, POC: NEGATIVE
NITRITE UR-MCNC: NEGATIVE MG/ML
PH UR: 5.5 [PH] (ref 5–8)
PROT UR STRIP-MCNC: ABNORMAL MG/DL
RBC # UR STRIP: NEGATIVE /UL
SP GR UR: 1.03 (ref 1–1.03)
UROBILINOGEN UR QL: NORMAL

## 2020-07-22 PROCEDURE — 99214 OFFICE O/P EST MOD 30 MIN: CPT | Performed by: NURSE PRACTITIONER

## 2020-07-22 PROCEDURE — 76817 TRANSVAGINAL US OBSTETRIC: CPT | Performed by: OBSTETRICS & GYNECOLOGY

## 2020-07-22 RX ORDER — NIFEDIPINE 30 MG/1
30 TABLET, EXTENDED RELEASE ORAL DAILY
Qty: 30 TABLET | Refills: 3 | Status: SHIPPED | OUTPATIENT
Start: 2020-07-22 | End: 2020-07-31 | Stop reason: ALTCHOICE

## 2020-07-22 RX ORDER — ASPIRIN 81 MG/1
81 TABLET ORAL DAILY
Qty: 30 TABLET | Refills: 6 | Status: SHIPPED | OUTPATIENT
Start: 2020-07-22 | End: 2021-12-01

## 2020-07-22 NOTE — PROGRESS NOTES
[facility]  Routine Annual Visit    2020    Patient: Rhina Marquez          MR#:[id number]      History of Present Illness    25 y.o. female  who presents for annual exam and confirmation of pregnancy    US confirms viable IUP.   This pregnancy is complicated by chronic hypertension and chronic headaches  Blood pressure elevated today- she is currently taking labetolol 200 mg BID and reports compliance  She has a neurology appointment next month for her headaches    Refuses pap smear today- states will have done at next visit  Prenatal labs collected today as well as instructions for baseline 24 hour urine.    She voices no complaints today.    Patient's last menstrual period was 2020 (exact date).  Obstetric History:  OB History        5    Para   3    Term   2       1    AB   1    Living   3       SAB   1    TAB   0    Ectopic   0    Molar   0    Multiple   0    Live Births   3               Menstrual History:     Patient's last menstrual period was 2020 (exact date).       Sexual History:       ________________________________________  Patient Active Problem List   Diagnosis   • Generalized convulsive seizures (CMS/HCC)   • Hx of preeclampsia, prior pregnancy, currently pregnant   • Obesity affecting pregnancy in second trimester   • Chronic hypertension affecting pregnancy   • Chronic headaches   • Pituitary gland enlarged (CMS/HCC)       Past Medical History:   Diagnosis Date   • Anxiety    • Asthma    • Convulsive seizure (CMS/HCC)     Neurologic workup to date is inconclusive for seizure disorder, patient did not follow through with EEG, saw neurologist Dr. Willam Calderon   • Depression     hx when younger   • Eye infection    • Eye infection    • Mild preeclampsia    • Obstetrical complication    • Pre-eclampsia in postpartum period 2018   • Preeclampsia    • Rubella non-immune status, antepartum    • UTI in pregnancy         Past Surgical History:   Procedure Laterality Date   • EAR TUBES         Social History     Tobacco Use   Smoking Status Former Smoker   Smokeless Tobacco Never Used       Family History   Problem Relation Age of Onset   • Hypertension Father    • Cancer Maternal Grandmother    • Stroke Maternal Grandfather    • Diabetes Maternal Grandfather    • Migraines Maternal Grandfather    • Dementia Maternal Grandfather    • Stroke Maternal Aunt    • Diabetes Maternal Uncle    • Migraines Mother    • Arthritis Mother        Prior to Admission medications    Medication Sig Start Date End Date Taking? Authorizing Provider   labetalol (NORMODYNE) 200 MG tablet Take 1 tablet by mouth Every 8 (Eight) Hours. 9/16/18  Yes Carlo Sanderson MD   PRENATAL GUMMY VITAMIN Chew 1 each Daily.    ProviderTeresa MD   VENTOLIN  (90 Base) MCG/ACT inhaler Inhale 2 puffs Every 6 (Six) Hours As Needed. 6/9/18   ProviderTeresa MD   ibuprofen (ADVIL,MOTRIN) 600 MG tablet Take 1 tablet by mouth Every 8 (Eight) Hours As Needed for Mild Pain . 9/9/18 7/22/20  Abdirashid Romero MD       The following portions of the patient's history were reviewed and updated as appropriate: allergies, current medications, past family history, past medical history, past social history, past surgical history and problem list.    Review of Systems   Constitutional: Negative.    HENT: Negative.    Eyes: Negative for visual disturbance.   Respiratory: Negative for cough, shortness of breath and wheezing.    Cardiovascular: Negative for chest pain, palpitations and leg swelling.   Gastrointestinal: Negative for abdominal distention, abdominal pain, blood in stool, constipation, diarrhea, nausea and vomiting.   Endocrine: Negative for cold intolerance and heat intolerance.   Genitourinary: Negative for difficulty urinating, dyspareunia, dysuria, frequency, genital sores, hematuria, menstrual problem, pelvic pain, urgency, vaginal bleeding, vaginal  "discharge and vaginal pain.   Musculoskeletal: Negative.    Skin: Negative.    Neurological: Negative for dizziness, weakness, light-headedness, numbness and headaches.   Hematological: Negative.    Psychiatric/Behavioral: Negative.    Breasts: negative for lumps skin changes, dimpling, swelling, nipple changes/discharge bilaterally       Objective   Physical Exam   Constitutional: She is oriented to person, place, and time. She appears well-developed and well-nourished. No distress.   Cardiovascular: Normal rate and regular rhythm.   Pulmonary/Chest: Effort normal and breath sounds normal.   Abdominal: Soft. Bowel sounds are normal.   Neurological: She is alert and oriented to person, place, and time.   Skin: Skin is warm and dry.   Psychiatric: She has a normal mood and affect. Her behavior is normal.       /94 (BP Location: Left arm, Patient Position: Sitting, Cuff Size: Adult)   Ht 157.5 cm (62\")   Wt 104 kg (230 lb)   LMP 05/31/2020 (Exact Date)   BMI 42.07 kg/m²    BP Readings from Last 3 Encounters:   07/22/20 152/94   09/16/18 135/82   09/14/18 140/100      Wt Readings from Last 3 Encounters:   07/22/20 104 kg (230 lb)   09/15/18 94.8 kg (209 lb)   09/14/18 93.9 kg (207 lb)        BMI: Estimated body mass index is 42.07 kg/m² as calculated from the following:    Height as of this encounter: 157.5 cm (62\").    Weight as of this encounter: 104 kg (230 lb).        Assessment:    1. Viable IUP  2. Chronic hypertension- discussed with Dr. Sanderson. Recommend D/C labetalol. Start procardia 30 mg ER daily as well as daily aspirin. Will RTO 1 week for BP check  3. Early pregnancy counseling provided   Patient is taking Prenatal vitamins  Problem list reviewed and updated  Reviewed routine prenatal care with the office to include but not limited to expected weight gain during pregnancy, Tylenol products are fine, avoid aspirin and ibuprofen; Zika (travel restrictions/ok to use insect repellant); not to change " cat litter; food restrictions; avoidance of alcohol, tobacco, drugs and saunas/hot tubs.   All questions answered.  4. SAB warnings      RTO 1 week for BP check and 4 weeks for OB intake    Plan:    []  Rx:   []  Mammogram request made  []  PAP done  []  Occult fecal blood test (Ins[facility])  []  Labs:   []  GC/Chl/TV  []  DEXA scan   []  Referral for colonoscopy:           Caroline Arreguin, JOAN  7/22/2020 15:48

## 2020-07-23 LAB
ABO GROUP BLD: NORMAL
BACTERIA UR CULT: NO GROWTH
BACTERIA UR CULT: NORMAL
BLD GP AB SCN SERPL QL: NEGATIVE
ERYTHROCYTE [DISTWIDTH] IN BLOOD BY AUTOMATED COUNT: 13.8 % (ref 11.7–15.4)
HBA1C MFR BLD: 5.1 % (ref 4.8–5.6)
HBV SURFACE AG SERPL QL IA: NEGATIVE
HCT VFR BLD AUTO: 40.7 % (ref 34–46.6)
HCV AB S/CO SERPL IA: <0.1 S/CO RATIO (ref 0–0.9)
HGB A MFR BLD: 97.8 % (ref 96.4–98.8)
HGB A2 MFR BLD COLUMN CHROM: 2.2 % (ref 1.8–3.2)
HGB BLD-MCNC: 13.2 G/DL (ref 11.1–15.9)
HGB C MFR BLD: 0 %
HGB F MFR BLD: 0 % (ref 0–2)
HGB FRACT BLD-IMP: NORMAL
HGB S BLD QL SOLY: NEGATIVE
HGB S MFR BLD: 0 %
HIV 1+2 AB+HIV1 P24 AG SERPL QL IA: NON REACTIVE
MCH RBC QN AUTO: 29.7 PG (ref 26.6–33)
MCHC RBC AUTO-ENTMCNC: 32.4 G/DL (ref 31.5–35.7)
MCV RBC AUTO: 92 FL (ref 79–97)
PLATELET # BLD AUTO: 308 X10E3/UL (ref 150–450)
RBC # BLD AUTO: 4.44 X10E6/UL (ref 3.77–5.28)
RH BLD: POSITIVE
RPR SER QL: NON REACTIVE
RUBV IGG SERPL IA-ACNC: 1.63 INDEX
TSH SERPL DL<=0.005 MIU/L-ACNC: 1.85 UIU/ML (ref 0.45–4.5)
VZV IGG SER IA-ACNC: <135 INDEX
WBC # BLD AUTO: 10.6 X10E3/UL (ref 3.4–10.8)

## 2020-07-27 ENCOUNTER — RESULTS ENCOUNTER (OUTPATIENT)
Dept: OBSTETRICS AND GYNECOLOGY | Age: 25
End: 2020-07-27

## 2020-07-27 DIAGNOSIS — Z34.81 ENCOUNTER FOR SUPERVISION OF OTHER NORMAL PREGNANCY IN FIRST TRIMESTER: ICD-10-CM

## 2020-07-27 PROBLEM — O09.899 SUSCEPTIBLE TO VARICELLA (NON-IMMUNE), CURRENTLY PREGNANT: Status: ACTIVE | Noted: 2020-07-27

## 2020-07-27 PROBLEM — Z28.39 SUSCEPTIBLE TO VARICELLA (NON-IMMUNE), CURRENTLY PREGNANT: Status: ACTIVE | Noted: 2020-07-27

## 2020-07-31 ENCOUNTER — OFFICE VISIT (OUTPATIENT)
Dept: OBSTETRICS AND GYNECOLOGY | Age: 25
End: 2020-07-31

## 2020-07-31 VITALS
WEIGHT: 227.4 LBS | HEIGHT: 62 IN | BODY MASS INDEX: 41.85 KG/M2 | DIASTOLIC BLOOD PRESSURE: 88 MMHG | SYSTOLIC BLOOD PRESSURE: 134 MMHG

## 2020-07-31 DIAGNOSIS — O10.919 CHRONIC HYPERTENSION AFFECTING PREGNANCY: Primary | ICD-10-CM

## 2020-07-31 DIAGNOSIS — Z13.89 SCREENING FOR BLOOD OR PROTEIN IN URINE: ICD-10-CM

## 2020-07-31 LAB
BILIRUB BLD-MCNC: ABNORMAL MG/DL
CLARITY, POC: ABNORMAL
COLOR UR: ABNORMAL
GLUCOSE UR STRIP-MCNC: NEGATIVE MG/DL
KETONES UR QL: NEGATIVE
LEUKOCYTE EST, POC: NEGATIVE
NITRITE UR-MCNC: NEGATIVE MG/ML
PH UR: 5.5 [PH] (ref 5–8)
PROT UR STRIP-MCNC: ABNORMAL MG/DL
RBC # UR STRIP: NEGATIVE /UL
SP GR UR: 1.03 (ref 1–1.03)
UROBILINOGEN UR QL: NORMAL

## 2020-07-31 PROCEDURE — 99213 OFFICE O/P EST LOW 20 MIN: CPT | Performed by: NURSE PRACTITIONER

## 2020-07-31 RX ORDER — NITROFURANTOIN 25; 75 MG/1; MG/1
100 CAPSULE ORAL 2 TIMES DAILY
COMMUNITY
End: 2020-11-03

## 2020-07-31 RX ORDER — LABETALOL 200 MG/1
200 TABLET, FILM COATED ORAL 2 TIMES DAILY
Qty: 30 TABLET | Refills: 1 | Status: SHIPPED | OUTPATIENT
Start: 2020-07-31 | End: 2020-09-02

## 2020-07-31 RX ORDER — LABETALOL 100 MG/1
100 TABLET, FILM COATED ORAL 2 TIMES DAILY
COMMUNITY
End: 2020-07-31

## 2020-07-31 NOTE — PROGRESS NOTES
"Subjective   Rhina Marquez is a 25 y.o. female.     History of Present Illness     Rhina presents for BP check in early pregnancy.  She was started on procardia last week but states discontinued due to \"flu- like\" symptoms and headache which resolved after stopping the medication- only took 2 doses.  She then restarted her labetolol 100 BID    BP today 134/88 and 148/92.  Rhina denies headache, no complaints today.      The following portions of the patient's history were reviewed and updated as appropriate: allergies, current medications, past family history, past medical history, past social history, past surgical history and problem list.    Review of Systems   Constitutional: Negative for chills, fatigue and fever.   Gastrointestinal: Negative for abdominal distention and abdominal pain.   Genitourinary: Negative for dysuria, frequency, hematuria, menstrual problem, pelvic pain, pelvic pressure, urgency, urinary incontinence, vaginal bleeding, vaginal discharge and vaginal pain.       Objective   Physical Exam   Constitutional: She is oriented to person, place, and time. She appears well-developed and well-nourished. No distress.   Neurological: She is alert and oriented to person, place, and time.   Skin: Skin is warm and dry.   Psychiatric: She has a normal mood and affect. Her behavior is normal.       Assessment/Plan   Rhina was seen today for routine prenatal visit.    Diagnoses and all orders for this visit:    Chronic hypertension affecting pregnancy    Screening for blood or protein in urine  -     POC Urinalysis Dipstick    Other orders  -     labetalol (NORMODYNE) 200 MG tablet; Take 1 tablet by mouth 2 (Two) Times a Day.      Discussed with Dr. Del Angel. Recommends increasing labetalol to 200 mg BID.  Advised strict 12 hour dosing.  She will follow up in 1 week for repeat BP check.  Recommend continue monitoring at home. Parameters reinforced. Will call for abnormal readings or symptoms.    Caroline " JO Arreguin, APRN

## 2020-08-07 ENCOUNTER — OFFICE VISIT (OUTPATIENT)
Dept: OBSTETRICS AND GYNECOLOGY | Age: 25
End: 2020-08-07

## 2020-08-07 VITALS
WEIGHT: 225 LBS | DIASTOLIC BLOOD PRESSURE: 88 MMHG | BODY MASS INDEX: 41.41 KG/M2 | SYSTOLIC BLOOD PRESSURE: 128 MMHG | HEIGHT: 62 IN

## 2020-08-07 DIAGNOSIS — Z13.89 SCREENING FOR BLOOD OR PROTEIN IN URINE: Primary | ICD-10-CM

## 2020-08-07 DIAGNOSIS — O10.919 CHRONIC HYPERTENSION AFFECTING PREGNANCY: ICD-10-CM

## 2020-08-07 LAB
BILIRUB BLD-MCNC: ABNORMAL MG/DL
CLARITY, POC: CLEAR
COLOR UR: YELLOW
GLUCOSE UR STRIP-MCNC: NEGATIVE MG/DL
KETONES UR QL: NEGATIVE
LEUKOCYTE EST, POC: NEGATIVE
NITRITE UR-MCNC: NEGATIVE MG/ML
PH UR: 6 [PH] (ref 5–8)
PROT UR STRIP-MCNC: ABNORMAL MG/DL
RBC # UR STRIP: NEGATIVE /UL
SP GR UR: 1.03 (ref 1–1.03)
UROBILINOGEN UR QL: NORMAL

## 2020-08-07 PROCEDURE — 99213 OFFICE O/P EST LOW 20 MIN: CPT | Performed by: NURSE PRACTITIONER

## 2020-08-07 NOTE — PROGRESS NOTES
Subjective   Rhina Marquez is a 25 y.o. female.     History of Present Illness     Rhina presents for follow up blood pressure.    Her labetolol was increased last week to 200 mg BID.   Blood pressure improved. She reports normal readings at home <130/80.  She is feeling well without complaint today.  Nausea improving she reports and no headaches.  She has her 24 hour urine jug and instructions at home and will bring to next visit.    The following portions of the patient's history were reviewed and updated as appropriate: allergies, current medications, past family history, past medical history, past social history, past surgical history and problem list.    Review of Systems   Constitutional: Negative for chills, fatigue and fever.   Gastrointestinal: Negative for abdominal distention and abdominal pain.   Genitourinary: Negative for dysuria, frequency, menstrual problem, pelvic pain, pelvic pressure and vaginal bleeding.   Neurological: Negative for dizziness, light-headedness and headache.       Objective   Physical Exam   Constitutional: She is oriented to person, place, and time. She appears well-developed and well-nourished. No distress.   Neurological: She is alert and oriented to person, place, and time.   Skin: Skin is warm and dry.   Psychiatric: She has a normal mood and affect. Her behavior is normal.       Assessment/Plan   Rhina was seen today for hypertension.    Diagnoses and all orders for this visit:    Screening for blood or protein in urine  -     POC Urinalysis Dipstick    Chronic hypertension affecting pregnancy      Call with any changes or concerns before next appointment. Call with elevated or low blood pressures.    JOAN Leach

## 2020-08-18 ENCOUNTER — INITIAL PRENATAL (OUTPATIENT)
Dept: OBSTETRICS AND GYNECOLOGY | Age: 25
End: 2020-08-18

## 2020-08-18 VITALS — SYSTOLIC BLOOD PRESSURE: 144 MMHG | DIASTOLIC BLOOD PRESSURE: 82 MMHG | WEIGHT: 216 LBS | BODY MASS INDEX: 39.51 KG/M2

## 2020-08-18 DIAGNOSIS — O10.919 CHRONIC HYPERTENSION AFFECTING PREGNANCY: ICD-10-CM

## 2020-08-18 DIAGNOSIS — Z28.39 SUSCEPTIBLE TO VARICELLA (NON-IMMUNE), CURRENTLY PREGNANT: ICD-10-CM

## 2020-08-18 DIAGNOSIS — O09.899 SUSCEPTIBLE TO VARICELLA (NON-IMMUNE), CURRENTLY PREGNANT: ICD-10-CM

## 2020-08-18 DIAGNOSIS — O09.299 HX OF PREECLAMPSIA, PRIOR PREGNANCY, CURRENTLY PREGNANT: ICD-10-CM

## 2020-08-18 DIAGNOSIS — O99.210 OBESITY IN PREGNANCY, ANTEPARTUM: ICD-10-CM

## 2020-08-18 DIAGNOSIS — O09.219 PREVIOUS PRETERM DELIVERY, ANTEPARTUM: ICD-10-CM

## 2020-08-18 DIAGNOSIS — Z34.81 PRENATAL CARE, SUBSEQUENT PREGNANCY IN FIRST TRIMESTER: Primary | ICD-10-CM

## 2020-08-18 DIAGNOSIS — Z13.89 SCREENING FOR BLOOD OR PROTEIN IN URINE: ICD-10-CM

## 2020-08-18 DIAGNOSIS — Z30.2 REQUEST FOR STERILIZATION: ICD-10-CM

## 2020-08-18 PROBLEM — O99.212 OBESITY AFFECTING PREGNANCY IN SECOND TRIMESTER: Status: RESOLVED | Noted: 2018-05-29 | Resolved: 2020-08-18

## 2020-08-18 LAB
BILIRUB BLD-MCNC: ABNORMAL MG/DL
EXTERNAL NIPT: NEGATIVE
GLUCOSE UR STRIP-MCNC: NEGATIVE MG/DL
KETONES UR QL: ABNORMAL
LEUKOCYTE EST, POC: NEGATIVE
NITRITE UR-MCNC: NEGATIVE MG/ML
PH UR: 7 [PH] (ref 5–8)
PROT UR STRIP-MCNC: ABNORMAL MG/DL
RBC # UR STRIP: NEGATIVE /UL
SP GR UR: 1.02 (ref 1–1.03)
UROBILINOGEN UR QL: NORMAL

## 2020-08-18 PROCEDURE — 99214 OFFICE O/P EST MOD 30 MIN: CPT | Performed by: OBSTETRICS & GYNECOLOGY

## 2020-08-18 NOTE — PROGRESS NOTES
Chief Complaint   Patient presents with   • Routine Prenatal Visit     cc: ob intake, pt well, no c/o needs 24 hr urine order        HPI: 25 y.o.  at 10w3d presents for ob intake - denies complaints -      Vitals:    20 1458   BP: 144/82   Weight: 98 kg (216 lb)       Review of systems:     Gen: negative  CV:     negative  GI: negative  :   negative  MS:    negative  Neuro: negative  Pul: negative      A/P  1. Intrauterine pregnancy at 10w3d   2. Pregnancy Risk:  HIGH RISK    Blood pressure is out of parameters.      Rhina was seen today for routine prenatal visit.    Diagnoses and all orders for this visit:    Screening for blood or protein in urine  -     POC Urinalysis Dipstick    Chronic hypertension affecting pregnancy  -     Comprehensive Metabolic Panel  -     Protein, Urine, 24 Hour - Urine, Clean Catch    Obesity in pregnancy, antepartum    Hx of preeclampsia, prior pregnancy, currently pregnant    Susceptible to varicella (non-immune), currently pregnant    Request for sterilization    Previous  delivery, antepartum        Nutrition and weight gain were addressed.  SAB warnings       -----------------------  PLAN:   Return in about 4 weeks (around 9/15/2020), or ob check .  CHTN - stable on Labetalol 200 mg bid   24-hour urine turned in today   OB intake done   Prenatal labs reviewed   Desires cfDNA today   H/o PreE - start ASA 81 mg at 12 weeks  Counseling was given to patient for the following topics: instructions for management, risk factor reductions and patient and family education . Total time of the encounter was 25 minutes and 20 minutes was spend counseling.         Anali Del Angel MD  2020 16:48

## 2020-08-19 LAB
ALBUMIN SERPL-MCNC: 4.3 G/DL (ref 3.9–5)
ALBUMIN/GLOB SERPL: 1.6 {RATIO} (ref 1.2–2.2)
ALP SERPL-CCNC: 53 IU/L (ref 39–117)
ALT SERPL-CCNC: 31 IU/L (ref 0–32)
AST SERPL-CCNC: 22 IU/L (ref 0–40)
BILIRUB SERPL-MCNC: 0.2 MG/DL (ref 0–1.2)
BUN SERPL-MCNC: 9 MG/DL (ref 6–20)
BUN/CREAT SERPL: 21 (ref 9–23)
CALCIUM SERPL-MCNC: 9.8 MG/DL (ref 8.7–10.2)
CHLORIDE SERPL-SCNC: 102 MMOL/L (ref 96–106)
CO2 SERPL-SCNC: 24 MMOL/L (ref 20–29)
CREAT SERPL-MCNC: 0.42 MG/DL (ref 0.57–1)
GLOBULIN SER CALC-MCNC: 2.7 G/DL (ref 1.5–4.5)
GLUCOSE SERPL-MCNC: 87 MG/DL (ref 65–99)
POTASSIUM SERPL-SCNC: 4.1 MMOL/L (ref 3.5–5.2)
PROT 24H UR-MRATE: 146 MG/24 HR (ref 30–150)
PROT SERPL-MCNC: 7 G/DL (ref 6–8.5)
PROT UR-MCNC: 19.4 MG/DL
SODIUM SERPL-SCNC: 139 MMOL/L (ref 134–144)

## 2020-09-01 PROBLEM — Z14.8 CARRIER OF FRAGILE X SYNDROME: Status: ACTIVE | Noted: 2020-09-01

## 2020-09-02 RX ORDER — LABETALOL 200 MG/1
TABLET, FILM COATED ORAL
Qty: 30 TABLET | Refills: 0 | Status: SHIPPED | OUTPATIENT
Start: 2020-09-02 | End: 2020-09-30

## 2020-09-15 ENCOUNTER — ROUTINE PRENATAL (OUTPATIENT)
Dept: OBSTETRICS AND GYNECOLOGY | Age: 25
End: 2020-09-15

## 2020-09-15 VITALS — WEIGHT: 219 LBS | DIASTOLIC BLOOD PRESSURE: 85 MMHG | BODY MASS INDEX: 40.06 KG/M2 | SYSTOLIC BLOOD PRESSURE: 132 MMHG

## 2020-09-15 DIAGNOSIS — O21.9 NAUSEA AND VOMITING DURING PREGNANCY: ICD-10-CM

## 2020-09-15 DIAGNOSIS — Z23 ENCOUNTER FOR ADMINISTRATION OF VACCINE: ICD-10-CM

## 2020-09-15 DIAGNOSIS — O09.219 PREVIOUS PRETERM DELIVERY, ANTEPARTUM: ICD-10-CM

## 2020-09-15 DIAGNOSIS — Z30.2 REQUEST FOR STERILIZATION: ICD-10-CM

## 2020-09-15 DIAGNOSIS — O09.299 HX OF PREECLAMPSIA, PRIOR PREGNANCY, CURRENTLY PREGNANT: ICD-10-CM

## 2020-09-15 DIAGNOSIS — Z34.82 PRENATAL CARE, SUBSEQUENT PREGNANCY IN SECOND TRIMESTER: Primary | ICD-10-CM

## 2020-09-15 DIAGNOSIS — O99.210 OBESITY IN PREGNANCY, ANTEPARTUM: ICD-10-CM

## 2020-09-15 DIAGNOSIS — Z28.39 SUSCEPTIBLE TO VARICELLA (NON-IMMUNE), CURRENTLY PREGNANT: ICD-10-CM

## 2020-09-15 DIAGNOSIS — Z13.89 SCREENING FOR BLOOD OR PROTEIN IN URINE: ICD-10-CM

## 2020-09-15 DIAGNOSIS — O10.919 CHRONIC HYPERTENSION AFFECTING PREGNANCY: ICD-10-CM

## 2020-09-15 DIAGNOSIS — O09.899 SUSCEPTIBLE TO VARICELLA (NON-IMMUNE), CURRENTLY PREGNANT: ICD-10-CM

## 2020-09-15 PROCEDURE — 90471 IMMUNIZATION ADMIN: CPT | Performed by: OBSTETRICS & GYNECOLOGY

## 2020-09-15 PROCEDURE — 90686 IIV4 VACC NO PRSV 0.5 ML IM: CPT | Performed by: OBSTETRICS & GYNECOLOGY

## 2020-09-15 PROCEDURE — 99214 OFFICE O/P EST MOD 30 MIN: CPT | Performed by: OBSTETRICS & GYNECOLOGY

## 2020-09-15 RX ORDER — ONDANSETRON 4 MG/1
4 TABLET, FILM COATED ORAL EVERY 6 HOURS PRN
Qty: 30 TABLET | Refills: 1 | Status: SHIPPED | OUTPATIENT
Start: 2020-09-15 | End: 2020-11-03

## 2020-09-15 NOTE — PROGRESS NOTES
Chief Complaint   Patient presents with   • Routine Prenatal Visit     cc: no c/o flu vaccine given        HPI: 25 y.o.  at 14w3d presents for prenatal care - c/o nausea      Vitals:    09/15/20 1547   BP: 132/85   Weight: 99.3 kg (219 lb)       Review of systems:     Gen: negative  CV:     negative  GI: nausea  :   negative  MS:    negative  Neuro: negative  Pul: negative      A/P  1. Intrauterine pregnancy at 14w3d   2. Pregnancy Risk:  HIGH RISK    Blood pressure is out of parameters.      Rhina was seen today for routine prenatal visit.    Diagnoses and all orders for this visit:    Prenatal care, subsequent pregnancy in second trimester    Screening for blood or protein in urine  -     POC Urinalysis Dipstick    Encounter for administration of vaccine    Chronic hypertension affecting pregnancy    Obesity in pregnancy, antepartum    Susceptible to varicella (non-immune), currently pregnant    Request for sterilization    Previous  delivery, antepartum    Hx of preeclampsia, prior pregnancy, currently pregnant    Nausea and vomiting during pregnancy  -     ondansetron (Zofran) 4 MG tablet; Take 1 tablet by mouth Every 6 (Six) Hours As Needed for Nausea or Vomiting.    Other orders  -     FluLaval Quad >6 Months ([id number])              -----------------------  PLAN:   Return in about 4 weeks (around 10/13/2020), or ob check and anatomy uS.  Nausea- Zofran Rx   CHTN - stable on labetalol/ASA   Obesity- doing well with weight gain   SAB warnings        Anali Del Angel MD  9/15/2020 16:17 EDT

## 2020-09-21 RX ORDER — LABETALOL 200 MG/1
TABLET, FILM COATED ORAL
Qty: 30 TABLET | Refills: 0 | OUTPATIENT
Start: 2020-09-21

## 2020-09-30 RX ORDER — LABETALOL 200 MG/1
TABLET, FILM COATED ORAL
Qty: 30 TABLET | Refills: 1 | Status: SHIPPED | OUTPATIENT
Start: 2020-09-30 | End: 2020-12-23 | Stop reason: SDUPTHER

## 2020-10-13 ENCOUNTER — PROCEDURE VISIT (OUTPATIENT)
Dept: OBSTETRICS AND GYNECOLOGY | Age: 25
End: 2020-10-13

## 2020-10-13 ENCOUNTER — ROUTINE PRENATAL (OUTPATIENT)
Dept: OBSTETRICS AND GYNECOLOGY | Age: 25
End: 2020-10-13

## 2020-10-13 VITALS — WEIGHT: 221 LBS | SYSTOLIC BLOOD PRESSURE: 134 MMHG | BODY MASS INDEX: 40.42 KG/M2 | DIASTOLIC BLOOD PRESSURE: 80 MMHG

## 2020-10-13 DIAGNOSIS — O09.219 PREVIOUS PRETERM DELIVERY, ANTEPARTUM: ICD-10-CM

## 2020-10-13 DIAGNOSIS — Z28.39 SUSCEPTIBLE TO VARICELLA (NON-IMMUNE), CURRENTLY PREGNANT: ICD-10-CM

## 2020-10-13 DIAGNOSIS — O99.210 OBESITY IN PREGNANCY, ANTEPARTUM: ICD-10-CM

## 2020-10-13 DIAGNOSIS — O09.299 HX OF PREECLAMPSIA, PRIOR PREGNANCY, CURRENTLY PREGNANT: ICD-10-CM

## 2020-10-13 DIAGNOSIS — O21.9 NAUSEA AND VOMITING DURING PREGNANCY: ICD-10-CM

## 2020-10-13 DIAGNOSIS — Z34.82 PRENATAL CARE, SUBSEQUENT PREGNANCY IN SECOND TRIMESTER: Primary | ICD-10-CM

## 2020-10-13 DIAGNOSIS — O10.919 CHRONIC HYPERTENSION AFFECTING PREGNANCY: ICD-10-CM

## 2020-10-13 DIAGNOSIS — O09.899 SUSCEPTIBLE TO VARICELLA (NON-IMMUNE), CURRENTLY PREGNANT: ICD-10-CM

## 2020-10-13 DIAGNOSIS — G89.29 CHRONIC INTRACTABLE HEADACHE, UNSPECIFIED HEADACHE TYPE: ICD-10-CM

## 2020-10-13 DIAGNOSIS — Z13.89 SCREENING FOR BLOOD OR PROTEIN IN URINE: ICD-10-CM

## 2020-10-13 DIAGNOSIS — Z30.2 REQUEST FOR STERILIZATION: ICD-10-CM

## 2020-10-13 DIAGNOSIS — R51.9 CHRONIC INTRACTABLE HEADACHE, UNSPECIFIED HEADACHE TYPE: ICD-10-CM

## 2020-10-13 DIAGNOSIS — Z36.89 SCREENING, ANTENATAL, FOR FETAL ANATOMIC SURVEY: Primary | ICD-10-CM

## 2020-10-13 LAB
BILIRUB BLD-MCNC: NEGATIVE MG/DL
GLUCOSE UR STRIP-MCNC: NEGATIVE MG/DL
KETONES UR QL: ABNORMAL
LEUKOCYTE EST, POC: NEGATIVE
NITRITE UR-MCNC: NEGATIVE MG/ML
PH UR: 5.5 [PH] (ref 5–8)
PROT UR STRIP-MCNC: NEGATIVE MG/DL
RBC # UR STRIP: NEGATIVE /UL
SP GR UR: 1.03 (ref 1–1.03)
UROBILINOGEN UR QL: NORMAL

## 2020-10-13 PROCEDURE — 99214 OFFICE O/P EST MOD 30 MIN: CPT | Performed by: OBSTETRICS & GYNECOLOGY

## 2020-10-13 PROCEDURE — 76805 OB US >/= 14 WKS SNGL FETUS: CPT | Performed by: OBSTETRICS & GYNECOLOGY

## 2020-10-13 NOTE — PROGRESS NOTES
Chief Complaint   Patient presents with   • Routine Prenatal Visit     cc: no c/o        HPI: 25 y.o.  at 18w3d presents for prenatal care - denies complaints   Vitals:    10/13/20 1440   BP: 134/80   Weight: 100 kg (221 lb)       Review of systems:     Gen: negative  CV:     negative  GI: negative  :   negative  MS:    negative  Neuro: negative  Pul: negative      A/P  1. Intrauterine pregnancy at 18w3d   2. Pregnancy Risk:  HIGH RISK    Blood pressure is out of parameters.      Diagnoses and all orders for this visit:    1. Screening for blood or protein in urine (Primary)  -     POC Urinalysis Dipstick    2. Chronic hypertension affecting pregnancy    3. Nausea and vomiting during pregnancy    4. Obesity in pregnancy, antepartum    5. Chronic intractable headache, unspecified headache type    6. Susceptible to varicella (non-immune), currently pregnant    7. Request for sterilization    8. Hx of preeclampsia, prior pregnancy, currently pregnant    9. Previous  delivery, antepartum        US discussed       -----------------------  PLAN:   Return in about 3 weeks (around 11/3/2020), or ob check.  CHTN - BP stable on Labetalol 200 BID   Cont ASA   Normal anatomy   Declines AFP   Obesity - 4 lb weight loss     Anali Del Angel MD  10/13/2020 14:50 EDT

## 2020-11-03 ENCOUNTER — ROUTINE PRENATAL (OUTPATIENT)
Dept: OBSTETRICS AND GYNECOLOGY | Age: 25
End: 2020-11-03

## 2020-11-03 VITALS — WEIGHT: 226 LBS | BODY MASS INDEX: 41.34 KG/M2 | DIASTOLIC BLOOD PRESSURE: 92 MMHG | SYSTOLIC BLOOD PRESSURE: 148 MMHG

## 2020-11-03 DIAGNOSIS — O99.210 OBESITY IN PREGNANCY, ANTEPARTUM: ICD-10-CM

## 2020-11-03 DIAGNOSIS — Z14.8 CARRIER OF FRAGILE X SYNDROME: ICD-10-CM

## 2020-11-03 DIAGNOSIS — O10.919 CHRONIC HYPERTENSION AFFECTING PREGNANCY: ICD-10-CM

## 2020-11-03 DIAGNOSIS — Z30.2 REQUEST FOR STERILIZATION: ICD-10-CM

## 2020-11-03 DIAGNOSIS — O09.899 SUSCEPTIBLE TO VARICELLA (NON-IMMUNE), CURRENTLY PREGNANT: ICD-10-CM

## 2020-11-03 DIAGNOSIS — Z28.39 SUSCEPTIBLE TO VARICELLA (NON-IMMUNE), CURRENTLY PREGNANT: ICD-10-CM

## 2020-11-03 DIAGNOSIS — Z13.89 SCREENING FOR BLOOD OR PROTEIN IN URINE: ICD-10-CM

## 2020-11-03 DIAGNOSIS — O09.219 PREVIOUS PRETERM DELIVERY, ANTEPARTUM: ICD-10-CM

## 2020-11-03 DIAGNOSIS — O09.299 HX OF PREECLAMPSIA, PRIOR PREGNANCY, CURRENTLY PREGNANT: ICD-10-CM

## 2020-11-03 DIAGNOSIS — Z34.82 PRENATAL CARE, SUBSEQUENT PREGNANCY IN SECOND TRIMESTER: Primary | ICD-10-CM

## 2020-11-03 LAB
BILIRUB BLD-MCNC: ABNORMAL MG/DL
GLUCOSE UR STRIP-MCNC: NEGATIVE MG/DL
KETONES UR QL: ABNORMAL
LEUKOCYTE EST, POC: NEGATIVE
NITRITE UR-MCNC: NEGATIVE MG/ML
PH UR: 5.5 [PH] (ref 5–8)
PROT UR STRIP-MCNC: NEGATIVE MG/DL
RBC # UR STRIP: NEGATIVE /UL
SP GR UR: 1.03 (ref 1–1.03)
UROBILINOGEN UR QL: NORMAL

## 2020-11-03 PROCEDURE — 99213 OFFICE O/P EST LOW 20 MIN: CPT | Performed by: OBSTETRICS & GYNECOLOGY

## 2020-11-03 RX ORDER — CEPHALEXIN 500 MG/1
CAPSULE ORAL
COMMUNITY
Start: 2020-11-02 | End: 2020-12-23

## 2020-11-03 RX ORDER — METRONIDAZOLE 7.5 MG/G
GEL VAGINAL
COMMUNITY
Start: 2020-11-02 | End: 2020-12-23

## 2020-12-01 ENCOUNTER — ROUTINE PRENATAL (OUTPATIENT)
Dept: OBSTETRICS AND GYNECOLOGY | Age: 25
End: 2020-12-01

## 2020-12-01 VITALS
SYSTOLIC BLOOD PRESSURE: 120 MMHG | BODY MASS INDEX: 41.53 KG/M2 | DIASTOLIC BLOOD PRESSURE: 82 MMHG | WEIGHT: 227.07 LBS

## 2020-12-01 DIAGNOSIS — O09.219 PREVIOUS PRETERM DELIVERY, ANTEPARTUM: ICD-10-CM

## 2020-12-01 DIAGNOSIS — Z34.82 PRENATAL CARE, SUBSEQUENT PREGNANCY IN SECOND TRIMESTER: Primary | ICD-10-CM

## 2020-12-01 DIAGNOSIS — O09.299 HX OF PREECLAMPSIA, PRIOR PREGNANCY, CURRENTLY PREGNANT: ICD-10-CM

## 2020-12-01 DIAGNOSIS — O09.899 SUSCEPTIBLE TO VARICELLA (NON-IMMUNE), CURRENTLY PREGNANT: ICD-10-CM

## 2020-12-01 DIAGNOSIS — Z14.8 CARRIER OF FRAGILE X SYNDROME: ICD-10-CM

## 2020-12-01 DIAGNOSIS — Z13.89 SCREENING FOR BLOOD OR PROTEIN IN URINE: ICD-10-CM

## 2020-12-01 DIAGNOSIS — O10.919 CHRONIC HYPERTENSION AFFECTING PREGNANCY: ICD-10-CM

## 2020-12-01 DIAGNOSIS — Z28.39 SUSCEPTIBLE TO VARICELLA (NON-IMMUNE), CURRENTLY PREGNANT: ICD-10-CM

## 2020-12-01 DIAGNOSIS — O99.210 OBESITY IN PREGNANCY, ANTEPARTUM: ICD-10-CM

## 2020-12-01 DIAGNOSIS — Z30.2 REQUEST FOR STERILIZATION: ICD-10-CM

## 2020-12-01 LAB
BILIRUB BLD-MCNC: NEGATIVE MG/DL
GLUCOSE UR STRIP-MCNC: NEGATIVE MG/DL
KETONES UR QL: NEGATIVE
LEUKOCYTE EST, POC: NEGATIVE
NITRITE UR-MCNC: NEGATIVE MG/ML
PH UR: 6 [PH] (ref 5–8)
PROT UR STRIP-MCNC: ABNORMAL MG/DL
RBC # UR STRIP: NEGATIVE /UL
SP GR UR: 1.03 (ref 1–1.03)
UROBILINOGEN UR QL: NORMAL

## 2020-12-01 PROCEDURE — 99214 OFFICE O/P EST MOD 30 MIN: CPT | Performed by: OBSTETRICS & GYNECOLOGY

## 2020-12-01 NOTE — PROGRESS NOTES
Chief Complaint   Patient presents with   • Routine Prenatal Visit     cc: ob chk, taking ASA and Labetalol, increased water intake, pelvic pain was severe since thursday until yest where she could hardly move eased up some yest, tasneem kwon have been so strong around the last week she almost went to the hospital       HPI: 25 y.o.  at 25w3d presents for prenatal care -       Vitals:    20 1457   BP: 120/82   Weight: 103 kg (227 lb 1.2 oz)       Review of systems:     Gen: negative  CV:     negative  GI: negative  :   good fetal movement noted  and pelvic pressure  MS:    negative  Neuro: negative  Pul: negative      A/P  1. Intrauterine pregnancy at 25w3d   2. Pregnancy Risk:  HIGH RISK        Diagnoses and all orders for this visit:    1. Prenatal care, subsequent pregnancy in second trimester (Primary)    2. Screening for blood or protein in urine  -     POC Urinalysis Dipstick    3. Susceptible to varicella (non-immune), currently pregnant    4. Request for sterilization    5. Previous  delivery, antepartum    6. Hx of preeclampsia, prior pregnancy, currently pregnant    7. Carrier of fragile X syndrome    8. Obesity in pregnancy, antepartum    9. Chronic hypertension affecting pregnancy        Pre-eclampsia symptoms were discussed and warnings were given   labor was discussed.  Warnings were provided.      -----------------------  PLAN:   Return in about 3 weeks (around 2020), or ob check/growth US and one-hour gtt.  CHTN - stable on labetalol and asa- check growth 3 weeks  Pelvic pressure - rec pelvic girdle   PTL warnings   Obesity - 11 lb weight gain         Anali Del Angel MD  2020 15:11 EST

## 2020-12-11 ENCOUNTER — TELEPHONE (OUTPATIENT)
Dept: OBSTETRICS AND GYNECOLOGY | Age: 25
End: 2020-12-11

## 2020-12-23 ENCOUNTER — ROUTINE PRENATAL (OUTPATIENT)
Dept: OBSTETRICS AND GYNECOLOGY | Age: 25
End: 2020-12-23

## 2020-12-23 VITALS — DIASTOLIC BLOOD PRESSURE: 80 MMHG | SYSTOLIC BLOOD PRESSURE: 125 MMHG | BODY MASS INDEX: 41.52 KG/M2 | WEIGHT: 227 LBS

## 2020-12-23 DIAGNOSIS — Z13.89 SCREENING FOR BLOOD OR PROTEIN IN URINE: ICD-10-CM

## 2020-12-23 DIAGNOSIS — Z13.1 SCREENING FOR DIABETES MELLITUS: ICD-10-CM

## 2020-12-23 DIAGNOSIS — O09.90 SUPERVISION OF HIGH RISK PREGNANCY, ANTEPARTUM: ICD-10-CM

## 2020-12-23 DIAGNOSIS — G44.229 CHRONIC TENSION-TYPE HEADACHE, NOT INTRACTABLE: ICD-10-CM

## 2020-12-23 DIAGNOSIS — O10.919 CHRONIC HYPERTENSION AFFECTING PREGNANCY: ICD-10-CM

## 2020-12-23 DIAGNOSIS — Z3A.28 28 WEEKS GESTATION OF PREGNANCY: Primary | ICD-10-CM

## 2020-12-23 DIAGNOSIS — Z30.2 REQUEST FOR STERILIZATION: ICD-10-CM

## 2020-12-23 DIAGNOSIS — Z13.0 SCREENING FOR IRON DEFICIENCY ANEMIA: ICD-10-CM

## 2020-12-23 DIAGNOSIS — O09.219 PREVIOUS PRETERM DELIVERY, ANTEPARTUM: ICD-10-CM

## 2020-12-23 DIAGNOSIS — O99.210 OBESITY IN PREGNANCY, ANTEPARTUM: ICD-10-CM

## 2020-12-23 DIAGNOSIS — O21.9 NAUSEA AND VOMITING DURING PREGNANCY: ICD-10-CM

## 2020-12-23 DIAGNOSIS — Z23 ENCOUNTER FOR ADMINISTRATION OF VACCINE: ICD-10-CM

## 2020-12-23 DIAGNOSIS — R56.9 GENERALIZED CONVULSIVE SEIZURES (HCC): ICD-10-CM

## 2020-12-23 LAB
BILIRUB BLD-MCNC: NEGATIVE MG/DL
CLARITY, POC: CLEAR
COLOR UR: YELLOW
GLUCOSE 1H P 50 G GLC PO SERPL-MCNC: 86 MG/DL (ref 65–139)
GLUCOSE UR STRIP-MCNC: NEGATIVE MG/DL
HCT VFR BLD AUTO: 35.6 % (ref 34–46.6)
HGB BLD-MCNC: 12 G/DL (ref 12–15.9)
KETONES UR QL: NEGATIVE
LEUKOCYTE EST, POC: NEGATIVE
NITRITE UR-MCNC: NEGATIVE MG/ML
PH UR: 6.5 [PH] (ref 5–8)
PROT UR STRIP-MCNC: NEGATIVE MG/DL
RBC # UR STRIP: NEGATIVE /UL
SP GR UR: 1.02 (ref 1–1.03)
UROBILINOGEN UR QL: NORMAL

## 2020-12-23 PROCEDURE — 90715 TDAP VACCINE 7 YRS/> IM: CPT | Performed by: OBSTETRICS & GYNECOLOGY

## 2020-12-23 PROCEDURE — 90471 IMMUNIZATION ADMIN: CPT | Performed by: OBSTETRICS & GYNECOLOGY

## 2020-12-23 PROCEDURE — 99214 OFFICE O/P EST MOD 30 MIN: CPT | Performed by: OBSTETRICS & GYNECOLOGY

## 2020-12-23 RX ORDER — LABETALOL 200 MG/1
200 TABLET, FILM COATED ORAL 3 TIMES DAILY
Qty: 90 TABLET | Refills: 5 | Status: SHIPPED | OUTPATIENT
Start: 2020-12-23 | End: 2022-06-06 | Stop reason: HOSPADM

## 2020-12-23 RX ORDER — LABETALOL 200 MG/1
200 TABLET, FILM COATED ORAL 2 TIMES DAILY
Qty: 60 TABLET | Refills: 5 | Status: SHIPPED | OUTPATIENT
Start: 2020-12-23 | End: 2020-12-23 | Stop reason: SDUPTHER

## 2020-12-23 NOTE — PROGRESS NOTES
Chief Complaint   Patient presents with   • Routine Prenatal Visit     cc: gtt/ hh/ tdap . pt c/o blurry vision , face swelling , dizziness , headaches , fast heart beat  pt needs a refill for labetalol .        HPI: 25 y.o.  at 28w4d presents for routine OB check with multiple complaints including increased frequency of chronic headaches that are intermittent and moderate over the past 2 weeks.  The patient is having periodic increase in heart rate and periodic palpitations that she reports are relatively infrequent.  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  Neurology previously thought the seizure disorder was some type of vagal episode and exacerbated by anxiety and stress.  Outside of pregnancy the patient is never been treated for seizure disorder.  Previously the patient saw the [facility] neurologist and reported her experience was unfavorable and she is requesting to be seen by a [facility] group.    Notably the patient is normotensive today with a pulse of 72.  She denies symptoms of headache today.  Morbid obesity additionally complicates the pregnancy.    Vitals:    20 0907   BP: 125/80   Weight: 103 kg (227 lb)       Review of systems:     Gen: malaise and increase seizures  CV:     palpations, lower extremity edema and systemic edema - hands, feet, face   GI: negative  :   negative and good fetal movement noted   MS:    negative  Neuro: visual changes, headache, blurring vision  and possible seizure activity   Pul: negative      A/P  1. Intrauterine pregnancy at 28w4d   2. Pregnancy Risk:  HIGH RISK        Diagnoses and all orders for this visit:    1. 28 weeks gestation of pregnancy (Primary)    2. Screening for iron deficiency anemia  -     Gestational Screen 1 Hr ([facility])  -     Hemoglobin & Hematocrit, Blood  -     POC Urinalysis Dipstick    3. Screening for diabetes mellitus  -     Gestational Screen 1 Hr ([facility])  -      Hemoglobin & Hematocrit, Blood  -     POC Urinalysis Dipstick    4. Encounter for administration of vaccine  -     Gestational Screen 1 Hr ([facility])  -     Hemoglobin & Hematocrit, Blood  -     POC Urinalysis Dipstick    5. Screening for blood or protein in urine  -     Gestational Screen 1 Hr ([facility])  -     Hemoglobin & Hematocrit, Blood  -     POC Urinalysis Dipstick    6. Supervision of high risk pregnancy, antepartum    7. Chronic hypertension affecting pregnancy Chronic medical problem - stable     8. Obesity in pregnancy, antepartum    9. Nausea and vomiting during pregnancy    10. Previous  delivery, antepartum    11. Request for sterilization    12. Generalized convulsive seizures (CMS/HCC) Chronic medical problem - worsening   -     Ambulatory Referral to Neurology    13. Chronic tension-type headache, not intractable Chronic medical problem - worsening   -Additional element for neurology to address    Other orders  -     Tdap Vaccine Greater Than or Equal To 6yo IM  -     Discontinue: labetalol (NORMODYNE) 200 MG tablet; Take 1 tablet by mouth 2 (Two) Times a Day.  Dispense: 60 tablet; Refill: 5  -     labetalol (NORMODYNE) 200 MG tablet; Take 1 tablet by mouth 3 (Three) Times a Day.  Dispense: 90 tablet; Refill: 5        Pre-eclampsia symptoms were discussed and warnings were given   labor was discussed.  Warnings were provided.  Nutrition and weight gain were addressed.      -----------------------  PLAN:   Return in about 2 weeks (around 2021) for ob check.  Ambulatory referral made for seizure type activity  Chronic hypertension seemingly stable but will increase labetalol to 3 times daily dosing.  The patient has chronic headaches which makes it harder to discern onset of preeclampsia  The patient is reporting increased [place] contractions and is concerned about  delivery.   labor warnings were given.  The patient may benefit from weekly fibronectin study  starting with the next visit        Cralo Sanderson MD  12/23/2020 10:15 EST

## 2021-01-06 ENCOUNTER — ROUTINE PRENATAL (OUTPATIENT)
Dept: OBSTETRICS AND GYNECOLOGY | Age: 26
End: 2021-01-06

## 2021-01-06 VITALS — BODY MASS INDEX: 41.88 KG/M2 | DIASTOLIC BLOOD PRESSURE: 78 MMHG | WEIGHT: 229 LBS | SYSTOLIC BLOOD PRESSURE: 124 MMHG

## 2021-01-06 DIAGNOSIS — O09.219 PREVIOUS PRETERM DELIVERY, ANTEPARTUM: ICD-10-CM

## 2021-01-06 DIAGNOSIS — Z13.89 SCREENING FOR HEMATURIA OR PROTEINURIA: ICD-10-CM

## 2021-01-06 DIAGNOSIS — O10.919 CHRONIC HYPERTENSION AFFECTING PREGNANCY: ICD-10-CM

## 2021-01-06 DIAGNOSIS — Z3A.30 30 WEEKS GESTATION OF PREGNANCY: Primary | ICD-10-CM

## 2021-01-06 LAB
GLUCOSE UR STRIP-MCNC: NEGATIVE MG/DL
PROT UR STRIP-MCNC: ABNORMAL MG/DL

## 2021-01-06 PROCEDURE — 99213 OFFICE O/P EST LOW 20 MIN: CPT | Performed by: NURSE PRACTITIONER

## 2021-01-06 NOTE — PROGRESS NOTES
Chief Complaint   Patient presents with   • Routine Prenatal Visit     Reports GFM.  She is having tasneem kwon and some cramping the past few days. She has had TDAP. Ped = Dr. Kanwal Mcghee       HPI: 25 y.o.  at 30w4d presents for routine OB visit    She c/o persistent, intermittent mild BHC- Dr. Sanderson recommended weekly FFN and this will begin today. PTL warnings reinforced with patient.  Denies leaking of fluid and bleeding  Reports good fetal movement  Normotensive   Denies headaches, swelling, vision changes    Vitals:    21 1053   BP: 124/78   Weight: 104 kg (229 lb)       Review of systems:     Gen: negative  CV:     negative  GI: negative  :   good fetal movement noted  and tasneem kwon type contractions  MS:    negative  Neuro: denies headaches and visual changes   Pul: negative      A/P  1. Intrauterine pregnancy at 30w4d   2. Pregnancy Risk:  COMPLICATED        Diagnoses and all orders for this visit:    1. 30 weeks gestation of pregnancy (Primary)  -     POC Urinalysis Dipstick  -     Fetal Fibronectin - Vaginal Fluid, Cervix    2. Screening for hematuria or proteinuria  -     POC Urinalysis Dipstick    3. Previous  delivery, antepartum    4. Chronic hypertension affecting pregnancy         labor was discussed.  Warnings were provided.  [facility] encouraged    -----------------------  PLAN:     1 week OB visit  Call for changes or concerns      Caroline Arreguin, APRN  2021 11:29 EST

## 2021-01-07 LAB — FIBRONECTIN FETAL VAG QL: NEGATIVE

## 2021-01-13 ENCOUNTER — ROUTINE PRENATAL (OUTPATIENT)
Dept: OBSTETRICS AND GYNECOLOGY | Age: 26
End: 2021-01-13

## 2021-01-13 VITALS — SYSTOLIC BLOOD PRESSURE: 110 MMHG | DIASTOLIC BLOOD PRESSURE: 70 MMHG | BODY MASS INDEX: 41.85 KG/M2 | WEIGHT: 228.8 LBS

## 2021-01-13 DIAGNOSIS — O09.219 PREVIOUS PRETERM DELIVERY, ANTEPARTUM: ICD-10-CM

## 2021-01-13 DIAGNOSIS — O10.919 CHRONIC HYPERTENSION AFFECTING PREGNANCY: ICD-10-CM

## 2021-01-13 DIAGNOSIS — Z3A.31 31 WEEKS GESTATION OF PREGNANCY: Primary | ICD-10-CM

## 2021-01-13 LAB
BILIRUB BLD-MCNC: NEGATIVE MG/DL
CLARITY, POC: CLEAR
COLOR UR: YELLOW
GLUCOSE UR STRIP-MCNC: NEGATIVE MG/DL
KETONES UR QL: ABNORMAL
LEUKOCYTE EST, POC: ABNORMAL
NITRITE UR-MCNC: NEGATIVE MG/ML
PH UR: 6 [PH] (ref 5–8)
PROT UR STRIP-MCNC: NEGATIVE MG/DL
RBC # UR STRIP: ABNORMAL /UL
SP GR UR: 1.03 (ref 1–1.03)
UROBILINOGEN UR QL: NORMAL

## 2021-01-13 PROCEDURE — 99213 OFFICE O/P EST LOW 20 MIN: CPT | Performed by: NURSE PRACTITIONER

## 2021-01-13 NOTE — PROGRESS NOTES
Chief Complaint   Patient presents with   • Routine Prenatal Visit       HPI: 25 y.o.  at 31w4d presents for routine OB visit without complaint    Denies ctx, lof, bleeding  Reports good fetal movement  Normotensive, neg protein    Vitals:    21 1459   BP: 110/70   Weight: 104 kg (228 lb 12.8 oz)       Review of systems:     Gen: negative  CV:     negative  GI: negative  :   good fetal movement noted   MS:    negative  Neuro: denies headaches and visual changes   Pul: negative      A/P  1. Intrauterine pregnancy at 31w4d   2. Pregnancy Risk:  HIGH RISK        Diagnoses and all orders for this visit:    1. 31 weeks gestation of pregnancy (Primary)  -     POC Urinalysis Dipstick  -     Fetal Fibronectin - Vaginal Fluid, Cervix    2. Chronic hypertension affecting pregnancy    3. Previous  delivery, antepartum  -     Fetal Fibronectin - Vaginal Fluid, Cervix        Pre-eclampsia symptoms were discussed and warnings were given   labor was discussed.  Warnings were provided.  [facility] encouraged  -----------------------  PLAN:     Weekly FFN today  1 week growth scan and OB visit  Call for changes or concerns       Caroline Arreguin, APRN  2021 15:19 EST

## 2021-01-14 LAB — FIBRONECTIN FETAL VAG QL: NEGATIVE

## 2021-01-20 ENCOUNTER — ROUTINE PRENATAL (OUTPATIENT)
Dept: OBSTETRICS AND GYNECOLOGY | Age: 26
End: 2021-01-20

## 2021-01-20 VITALS — WEIGHT: 232 LBS | SYSTOLIC BLOOD PRESSURE: 124 MMHG | DIASTOLIC BLOOD PRESSURE: 82 MMHG | BODY MASS INDEX: 42.43 KG/M2

## 2021-01-20 DIAGNOSIS — O09.219 PREVIOUS PRETERM DELIVERY, ANTEPARTUM: ICD-10-CM

## 2021-01-20 DIAGNOSIS — Z13.89 SCREENING FOR BLOOD OR PROTEIN IN URINE: ICD-10-CM

## 2021-01-20 DIAGNOSIS — O21.9 NAUSEA AND VOMITING DURING PREGNANCY: ICD-10-CM

## 2021-01-20 DIAGNOSIS — O99.210 OBESITY IN PREGNANCY, ANTEPARTUM: ICD-10-CM

## 2021-01-20 DIAGNOSIS — G44.221 CHRONIC TENSION-TYPE HEADACHE, INTRACTABLE: ICD-10-CM

## 2021-01-20 DIAGNOSIS — Z14.8 CARRIER OF FRAGILE X SYNDROME: ICD-10-CM

## 2021-01-20 DIAGNOSIS — R51.9 PREGNANCY HEADACHE IN THIRD TRIMESTER: ICD-10-CM

## 2021-01-20 DIAGNOSIS — Z3A.32 32 WEEKS GESTATION OF PREGNANCY: Primary | ICD-10-CM

## 2021-01-20 DIAGNOSIS — O09.90 SUPERVISION OF HIGH RISK PREGNANCY, ANTEPARTUM: ICD-10-CM

## 2021-01-20 DIAGNOSIS — O47.9 BRAXTON HICK'S CONTRACTION: ICD-10-CM

## 2021-01-20 DIAGNOSIS — O26.893 PREGNANCY HEADACHE IN THIRD TRIMESTER: ICD-10-CM

## 2021-01-20 DIAGNOSIS — O10.919 CHRONIC HYPERTENSION AFFECTING PREGNANCY: ICD-10-CM

## 2021-01-20 DIAGNOSIS — R56.9 GENERALIZED CONVULSIVE SEIZURES (HCC): ICD-10-CM

## 2021-01-20 PROCEDURE — 99214 OFFICE O/P EST MOD 30 MIN: CPT | Performed by: OBSTETRICS & GYNECOLOGY

## 2021-01-20 NOTE — PROGRESS NOTES
Chief Complaint   Patient presents with   • Routine Prenatal Visit     US, pt c/o lost of Tasneem kwon but no contractions       HPI: 25 y.o.  at 32w4d presents with complaint of intermittent mild Tasneem Kwon contractions that are barely perceptible with no discomfort over the last week.  The patient periodically will have some milder contractions that she rates 2/10.  With the patient's prior  delivery fetal fibronectin is being followed and will be sent today.  The pregnancy is complicated by chronic hypertension with the patient's blood pressure well controlled on current dose of medication.  In the last 3 to 4 days the patient reports intermittent mild headaches mostly in the afternoon.    Vitals:    21 1044   BP: 124/82   Weight: 105 kg (232 lb)       Review of systems:     Gen: negative  CV:     negative  GI: negative  :   good fetal movement noted , tasneem kwon type contractions and pelvic pressure  MS:    back pain   Neuro: headache  Pul: negative      A/P  1. Intrauterine pregnancy at 32w4d   2. Pregnancy Risk:  HIGH RISK        Diagnoses and all orders for this visit:    1. 32 weeks gestation of pregnancy (Primary)    2. Screening for blood or protein in urine    3. Supervision of high risk pregnancy, antepartum    4. Chronic hypertension affecting pregnancy    5. Carrier of fragile X syndrome    6. Chronic tension-type headache, intractable    7. Obesity in pregnancy, antepartum    8. Nausea and vomiting during pregnancy    9. Generalized convulsive seizures (CMS/HCC)    10. Previous  delivery, antepartum    11. Pregnancy headache in third trimester    12. [place] contraction        Pre-eclampsia symptoms were discussed and warnings were given   labor was discussed.  Warnings were provided.  Nutrition and weight gain were addressed.      -----------------------  PLAN:   Return in about 1 week (around 2021) for OB check and BPP.      Carlo Sanderson,  MD  1/20/2021 11:11 EST

## 2021-01-21 ENCOUNTER — HOSPITAL ENCOUNTER (INPATIENT)
Facility: HOSPITAL | Age: 26
LOS: 2 days | Discharge: HOME | End: 2021-01-23
Attending: OBSTETRICS & GYNECOLOGY | Admitting: OBSTETRICS & GYNECOLOGY

## 2021-01-21 ENCOUNTER — TELEPHONE (OUTPATIENT)
Dept: OBSTETRICS AND GYNECOLOGY | Age: 26
End: 2021-01-21

## 2021-01-21 PROBLEM — Z34.90 PREGNANCY: Status: ACTIVE | Noted: 2021-01-21

## 2021-01-21 LAB
ABO GROUP BLD: NORMAL
ALBUMIN SERPL-MCNC: 3.4 G/DL (ref 3.5–5.2)
ALBUMIN/GLOB SERPL: 1.3 G/DL
ALP SERPL-CCNC: 100 U/L (ref 39–117)
ALT SERPL W P-5'-P-CCNC: 15 U/L (ref 1–33)
ANION GAP SERPL CALCULATED.3IONS-SCNC: 11.9 MMOL/L (ref 5–15)
ANISOCYTOSIS BLD QL: ABNORMAL
AST SERPL-CCNC: 22 U/L (ref 1–32)
BASOPHILS # BLD MANUAL: 0.12 10*3/MM3 (ref 0–0.2)
BASOPHILS NFR BLD AUTO: 1 % (ref 0–1.5)
BILIRUB SERPL-MCNC: 0.2 MG/DL (ref 0–1.2)
BILIRUB UR QL STRIP: NEGATIVE
BLD GP AB SCN SERPL QL: NEGATIVE
BUN SERPL-MCNC: 10 MG/DL (ref 6–20)
BUN/CREAT SERPL: 25.6 (ref 7–25)
CALCIUM SPEC-SCNC: 9.1 MG/DL (ref 8.6–10.5)
CHLORIDE SERPL-SCNC: 107 MMOL/L (ref 98–107)
CLARITY UR: CLEAR
CO2 SERPL-SCNC: 20.1 MMOL/L (ref 22–29)
COLOR UR: YELLOW
CREAT SERPL-MCNC: 0.39 MG/DL (ref 0.57–1)
CREAT UR-MCNC: 115.4 MG/DL
DEPRECATED RDW RBC AUTO: 40.4 FL (ref 37–54)
EOSINOPHIL # BLD MANUAL: 0.24 10*3/MM3 (ref 0–0.4)
EOSINOPHIL NFR BLD MANUAL: 2 % (ref 0.3–6.2)
ERYTHROCYTE [DISTWIDTH] IN BLOOD BY AUTOMATED COUNT: 13.1 % (ref 12.3–15.4)
FIBRONECTIN FETAL VAG QL: NEGATIVE
GFR SERPL CREATININE-BSD FRML MDRD: >150 ML/MIN/1.73
GLOBULIN UR ELPH-MCNC: 2.7 GM/DL
GLUCOSE SERPL-MCNC: 68 MG/DL (ref 65–99)
GLUCOSE UR STRIP-MCNC: NEGATIVE MG/DL
HCT VFR BLD AUTO: 35.4 % (ref 34–46.6)
HGB BLD-MCNC: 11.6 G/DL (ref 12–15.9)
HGB UR QL STRIP.AUTO: NEGATIVE
KETONES UR QL STRIP: NEGATIVE
LEUKOCYTE ESTERASE UR QL STRIP.AUTO: NEGATIVE
LYMPHOCYTES # BLD MANUAL: 2.14 10*3/MM3 (ref 0.7–3.1)
LYMPHOCYTES NFR BLD MANUAL: 18.2 % (ref 19.6–45.3)
LYMPHOCYTES NFR BLD MANUAL: 4 % (ref 5–12)
MCH RBC QN AUTO: 28.2 PG (ref 26.6–33)
MCHC RBC AUTO-ENTMCNC: 32.8 G/DL (ref 31.5–35.7)
MCV RBC AUTO: 85.9 FL (ref 79–97)
METAMYELOCYTES NFR BLD MANUAL: 2 % (ref 0–0)
MONOCYTES # BLD AUTO: 0.47 10*3/MM3 (ref 0.1–0.9)
MYELOCYTES NFR BLD MANUAL: 1 % (ref 0–0)
NEUTROPHILS # BLD AUTO: 8.45 10*3/MM3 (ref 1.7–7)
NEUTROPHILS NFR BLD MANUAL: 71.7 % (ref 42.7–76)
NITRITE UR QL STRIP: NEGATIVE
NRBC SPEC MANUAL: 1 /100 WBC (ref 0–0.2)
PH UR STRIP.AUTO: 6.5 [PH] (ref 5–8)
PLAT MORPH BLD: NORMAL
PLATELET # BLD AUTO: 293 10*3/MM3 (ref 140–450)
PMV BLD AUTO: 11.6 FL (ref 6–12)
POTASSIUM SERPL-SCNC: 4.1 MMOL/L (ref 3.5–5.2)
PROT SERPL-MCNC: 6.1 G/DL (ref 6–8.5)
PROT UR QL STRIP: NEGATIVE
PROT UR-MCNC: 15 MG/DL
PROT/CREAT UR: 130 MG/G CREA (ref 0–200)
RBC # BLD AUTO: 4.12 10*6/MM3 (ref 3.77–5.28)
RH BLD: POSITIVE
SARS-COV-2 RNA RESP QL NAA+PROBE: NOT DETECTED
SODIUM SERPL-SCNC: 139 MMOL/L (ref 136–145)
SP GR UR STRIP: >=1.03 (ref 1–1.03)
T&S EXPIRATION DATE: NORMAL
UROBILINOGEN UR QL STRIP: NORMAL
WBC # BLD AUTO: 11.78 10*3/MM3 (ref 3.4–10.8)
WBC MORPH BLD: NORMAL

## 2021-01-21 PROCEDURE — U0003 INFECTIOUS AGENT DETECTION BY NUCLEIC ACID (DNA OR RNA); SEVERE ACUTE RESPIRATORY SYNDROME CORONAVIRUS 2 (SARS-COV-2) (CORONAVIRUS DISEASE [COVID-19]), AMPLIFIED PROBE TECHNIQUE, MAKING USE OF HIGH THROUGHPUT TECHNOLOGIES AS DESCRIBED BY CMS-2020-01-R: HCPCS | Performed by: OBSTETRICS & GYNECOLOGY

## 2021-01-21 PROCEDURE — 87081 CULTURE SCREEN ONLY: CPT | Performed by: OBSTETRICS & GYNECOLOGY

## 2021-01-21 PROCEDURE — 25010000002 BETAMETHASONE ACET & SOD PHOS PER 4 MG: Performed by: OBSTETRICS & GYNECOLOGY

## 2021-01-21 PROCEDURE — 85007 BL SMEAR W/DIFF WBC COUNT: CPT | Performed by: OBSTETRICS & GYNECOLOGY

## 2021-01-21 PROCEDURE — 87086 URINE CULTURE/COLONY COUNT: CPT | Performed by: OBSTETRICS & GYNECOLOGY

## 2021-01-21 PROCEDURE — 81003 URINALYSIS AUTO W/O SCOPE: CPT | Performed by: OBSTETRICS & GYNECOLOGY

## 2021-01-21 PROCEDURE — 99284 EMERGENCY DEPT VISIT MOD MDM: CPT | Performed by: OBSTETRICS & GYNECOLOGY

## 2021-01-21 PROCEDURE — 25010000002 MAGNESIUM SULFATE 40 GM/1000ML SOLUTION: Performed by: OBSTETRICS & GYNECOLOGY

## 2021-01-21 PROCEDURE — 80053 COMPREHEN METABOLIC PANEL: CPT | Performed by: OBSTETRICS & GYNECOLOGY

## 2021-01-21 PROCEDURE — 86900 BLOOD TYPING SEROLOGIC ABO: CPT | Performed by: OBSTETRICS & GYNECOLOGY

## 2021-01-21 PROCEDURE — 99221 1ST HOSP IP/OBS SF/LOW 40: CPT | Performed by: OBSTETRICS & GYNECOLOGY

## 2021-01-21 PROCEDURE — 82570 ASSAY OF URINE CREATININE: CPT | Performed by: OBSTETRICS & GYNECOLOGY

## 2021-01-21 PROCEDURE — 84156 ASSAY OF PROTEIN URINE: CPT | Performed by: OBSTETRICS & GYNECOLOGY

## 2021-01-21 PROCEDURE — 86901 BLOOD TYPING SEROLOGIC RH(D): CPT | Performed by: OBSTETRICS & GYNECOLOGY

## 2021-01-21 PROCEDURE — 85025 COMPLETE CBC W/AUTO DIFF WBC: CPT | Performed by: OBSTETRICS & GYNECOLOGY

## 2021-01-21 PROCEDURE — 86850 RBC ANTIBODY SCREEN: CPT | Performed by: OBSTETRICS & GYNECOLOGY

## 2021-01-21 RX ORDER — MAGNESIUM SULFATE HEPTAHYDRATE 40 MG/ML
2 INJECTION, SOLUTION INTRAVENOUS CONTINUOUS
Status: DISCONTINUED | OUTPATIENT
Start: 2021-01-21 | End: 2021-01-23

## 2021-01-21 RX ORDER — SODIUM CHLORIDE 0.9 % (FLUSH) 0.9 %
10 SYRINGE (ML) INJECTION AS NEEDED
Status: DISCONTINUED | OUTPATIENT
Start: 2021-01-21 | End: 2021-01-23 | Stop reason: HOSPADM

## 2021-01-21 RX ORDER — SODIUM CHLORIDE 0.9 % (FLUSH) 0.9 %
10 SYRINGE (ML) INJECTION EVERY 12 HOURS SCHEDULED
Status: DISCONTINUED | OUTPATIENT
Start: 2021-01-21 | End: 2021-01-23 | Stop reason: HOSPADM

## 2021-01-21 RX ORDER — SODIUM CHLORIDE, SODIUM LACTATE, POTASSIUM CHLORIDE, CALCIUM CHLORIDE 600; 310; 30; 20 MG/100ML; MG/100ML; MG/100ML; MG/100ML
75 INJECTION, SOLUTION INTRAVENOUS CONTINUOUS
Status: DISCONTINUED | OUTPATIENT
Start: 2021-01-21 | End: 2021-01-23

## 2021-01-21 RX ORDER — LABETALOL 200 MG/1
200 TABLET, FILM COATED ORAL EVERY 8 HOURS SCHEDULED
Status: DISCONTINUED | OUTPATIENT
Start: 2021-01-21 | End: 2021-01-23 | Stop reason: HOSPADM

## 2021-01-21 RX ORDER — BETAMETHASONE SODIUM PHOSPHATE AND BETAMETHASONE ACETATE 3; 3 MG/ML; MG/ML
12 INJECTION, SUSPENSION INTRA-ARTICULAR; INTRALESIONAL; INTRAMUSCULAR; SOFT TISSUE EVERY 24 HOURS
Status: COMPLETED | OUTPATIENT
Start: 2021-01-21 | End: 2021-01-22

## 2021-01-21 RX ORDER — PRENATAL VIT/IRON FUM/FOLIC AC 27MG-0.8MG
1 TABLET ORAL DAILY
Status: DISCONTINUED | OUTPATIENT
Start: 2021-01-22 | End: 2021-01-23 | Stop reason: HOSPADM

## 2021-01-21 RX ADMIN — MAGNESIUM SULFATE HEPTAHYDRATE 2 G/HR: 40 INJECTION, SOLUTION INTRAVENOUS at 18:31

## 2021-01-21 RX ADMIN — LABETALOL HYDROCHLORIDE 200 MG: 200 TABLET, FILM COATED ORAL at 16:32

## 2021-01-21 RX ADMIN — SODIUM CHLORIDE, POTASSIUM CHLORIDE, SODIUM LACTATE AND CALCIUM CHLORIDE 1000 ML: 600; 310; 30; 20 INJECTION, SOLUTION INTRAVENOUS at 16:35

## 2021-01-21 RX ADMIN — BETAMETHASONE SODIUM PHOSPHATE AND BETAMETHASONE ACETATE 12 MG: 3; 3 INJECTION, SUSPENSION INTRA-ARTICULAR; INTRALESIONAL; INTRAMUSCULAR; SOFT TISSUE at 16:38

## 2021-01-21 RX ADMIN — SODIUM CHLORIDE, POTASSIUM CHLORIDE, SODIUM LACTATE AND CALCIUM CHLORIDE 75 ML/HR: 600; 310; 30; 20 INJECTION, SOLUTION INTRAVENOUS at 18:28

## 2021-01-21 NOTE — OBED NOTES
[facility]  Rhina Marquez  : 1995  MRN: [id number]  CSN: [id number]    OB ED Provider Note    Subjective   Chief Complaint   Patient presents with   • Contractions     Pt to MAYTE with c/o contractions that started yesterday in the office after vaginal exam.  Pt was 1 cm yesterday.  Pt denies vaginal bleeding and loss of fluid.     hRina Marquez is a 25 y.o. year old  with an Estimated Date of Delivery: 3/13/21 currently at 32w5d presenting with CTX since 0500.  They are occurring every 5 min.  She denies ROM or VB.  FM is present.  She was seen in the office yesterday for cramping at which time she had an fFN performed due to cramping.  It was negative. Cervix was reportedly 1 cm dilated.  She denies HA, visual changes.      Prenatal care has been with Dr. Carlo Sanderson.  It has been complicated by CHTN, CTX, Hx PTD.    OB History    Para Term  AB Living   5 3 2 1 1 3   SAB TAB Ectopic Molar Multiple Live Births   1 0 0 0 0 3      # Outcome Date GA Lbr Avinash/2nd Weight Sex Delivery Anes PTL Lv   5 Current            4  18 34w6d 03:03 / 00:16 3162 g (6 lb 15.5 oz) M Vag-Spont EPI Y JOAN      Birth Comments: del note reviewed - no comps -F        Complications: Preeclampsia in postpartum period      Name: Yandel       Apgar1: 8  Apgar5: 9   3 Term 16 37w0d 03:02 / 00:13 3756 g (8 lb 4.5 oz) F Vag-Spont EPI N JOAN      Birth Comments: delivery note reviewed - no comps - F       Complications: Preeclampsia      Name: Siri       Apgar1: 9  Apgar5: 9   2 Term 11/05/15 37w0d  2906 g (6 lb 6.5 oz) F Vag-Spont   JOAN      Birth Comments: Delivery note reviewed.  No complications HB      Complications: Preeclampsia      Name: KYDELORISARELY      Apgar1: 8  Apgar5: 9   1 SAB               Obstetric Comments   2020 6w4d Dating US:  Estimated Date of Delivery: 3/13/21 based on US hb    10/13/20 - 18-3 weeks - Normal CL and normal completed anatomy - EIF- JHF    2020  "28w4d normal interval growth: EFW 72% AC 86%, Breech  hb      Past Medical History:   Diagnosis Date   • Eye infection 2000   • Convulsive seizure (CMS/HCC) 2015    Neurologic workup to date is inconclusive for seizure disorder, patient did not follow through with EEG, saw neurologist Dr. Willam Calderon   • Preeclampsia 2015   • Mild preeclampsia 2016   • Pre-eclampsia in postpartum period 9/14/2018   • Anxiety    • Asthma    • Depression     hx when younger   • Eye infection    • Gestational hypertension    • Obstetrical complication    • Rubella non-immune status, antepartum    • UTI in pregnancy      Past Surgical History:   Procedure Laterality Date   • EAR TUBES         Current Facility-Administered Medications:   •  lactated ringers bolus 1,000 mL, 1,000 mL, Intravenous, Once, Aury Villafuerte MD  •  sodium chloride 0.9 % flush 10 mL, 10 mL, Intravenous, Q12H, Aury Villafuerte MD  •  sodium chloride 0.9 % flush 10 mL, 10 mL, Intravenous, PRN, Aury Villafuerte MD    No Known Allergies  Social History    Tobacco Use      Smoking status: Former Smoker      Smokeless tobacco: Never Used    Review of Systems   All other systems reviewed and are negative.        Objective   Temp 99.2 °F (37.3 °C) (Oral)   Resp 17   Ht 157.5 cm (62\")   Wt 105 kg (230 lb 12.8 oz)   LMP 05/31/2020 (Exact Date)   BMI 42.21 kg/m²   General: well developed; well nourished  no acute distress   Abdomen: soft, non-tender; no masses  gravid    FHT's: reactive and category 1      Cervix: was checked (by RN): 2 cm / 70 % / -2   Presentation: cephalic   Contractions: q 3-4 min   Chest: Unlabored respirations    CV:  RRR   Ext:   No C/C/1+ BLE to ankles   Back: CVA tenderness is absent bilateral        Prenatal Labs  Lab Results   Component Value Date    HGB 12.0 12/23/2020    RUBELLAABIGG 1.63 07/22/2020    HEPBSAG Negative 07/22/2020    ABORH O Rh Positive 11/03/2015    ABSCRN Negative 07/22/2020    GRL5EMR0 Non Reactive 07/22/2020    " HEPCVIRUSABY <0.1 2020    GCT 86 2020    AYQ7LATF 83 10/07/2016    STREPGPB Positive (A) 2016    URINECX Final report 2020    CHLAMNAA Negative 2018    NGONORRHON Negative 2018       Current Labs Reviewed   Pregnancy 28 week labs:   Lab Results   Component Value Date    HGB 12.0 2020    HCT 35.6 2020    GCT 86 2020    QHR1GXPS 83 10/07/2016     Prenatal Panel:   Lab Results   Component Value Date    HGB 12.0 2020    RUBELLAABIGG 1.63 2020    HEPBSAG Negative 2020    ABORH O Rh Positive 2015    ABSCRN Negative 2020    CXS1MHL3 Non Reactive 2020    HEPCVIRUSABY <0.1 2020    GCT 86 2020    PNW2LGKF 83 10/07/2016    STREPGPB Positive (A) 2016    URINECX Final report 2020    CHLAMNAA Negative 2018    NGONORRHON Negative 2018          Assessment   1. IUP at 32w5d  2.  labor- despite negative fFN, cervical change since yesterday with ongoing CTX.  She has a history of a 34 week delivery.  3. Gestational HTN- Hx of pre-eclampsia with previous gestations     Plan   1. Admit to L&D for BMZ, continuous monitoring, GBS cultures and prophylaxis.  Dr. Hutchins notified and is assuming management.      Aury Villafuerte MD  2021  15:03 EST

## 2021-01-21 NOTE — H&P
History and physical    Admission date 2021     Patient: Rhina Marquez MRN: [id number]   YOB: 1995 Age: 25 y.o. Sex: female     Chief Complaint   Patient presents with   • Contractions     Pt to MAYTE with c/o contractions that started yesterday in the office after vaginal exam.  Pt was 1 cm yesterday.  Pt denies vaginal bleeding and loss of fluid.       HPI:    Rhina Marquez is a 25 y.o.,  AT 32w5d admitted for  labor. Denies vaginal bleeding, leakage of fluid, but does complain of having contractions since early this morning.  States they have been occurring every 5 minutes.. Admits to good fetal movement.     Patient was seen in the office yesterday, fetal fibronectin was performed and was reported as negative.    Patient previously had a 34-week delivery of a 6 pound 15 ounce male infant at 34 weeks 6 days.  She also was noted to have some postpartum preeclamptic symptoms after that delivery.    Patient does not have a headache visual changes or other abdominal pain.  Pregnancy is also been complicated by chronic hypertension.  She has been managed by utilizing labetalol this pregnancy.  Dosage is said to be 200 mg twice a day.  Patient also takes baby aspirin daily.  Compliance is uncertain.  She states she has had seizures previously but her history is a little unclear says it was not associated with preeclampsia.  States she does not typically have seizures in a nonpregnant state.  She never followed through on a neurology consultation that had been scheduled.  Patient did not follow through on a neurology consultation that had been ordered.    Patient Active Problem List   Diagnosis   • Generalized convulsive seizures (CMS/HCC)   • Hx of preeclampsia, prior pregnancy, currently pregnant   • Chronic hypertension affecting pregnancy   • Chronic headaches   • Pituitary gland enlarged (CMS/HCC)   • Susceptible to varicella (non-immune), currently pregnant   • Obesity in  pregnancy, antepartum   • Previous  delivery, antepartum   • Request for sterilization   • Carrier of fragile X syndrome   • Nausea and vomiting during pregnancy   • Supervision of high risk pregnancy, antepartum     OB History    Para Term  AB Living   5 3 2 1 1 3   SAB TAB Ectopic Molar Multiple Live Births   1 0 0 0 0 3      # Outcome Date GA Lbr Avinash/2nd Weight Sex Delivery Anes PTL Lv   5 Current            4  18 34w6d 03:03 / 00:16 3162 g (6 lb 15.5 oz) M Vag-Spont EPI Y JOAN      Birth Comments: del note reviewed - no comps -JHF        Complications: Preeclampsia in postpartum period   3 Term 16 37w0d 03:02 / 00:13 3756 g (8 lb 4.5 oz) F Vag-Spont EPI N JOAN      Birth Comments: delivery note reviewed - no comps - JHF       Complications: Preeclampsia   2 Term 11/05/15 37w0d  2906 g (6 lb 6.5 oz) F Vag-Spont   JOAN      Birth Comments: Delivery note reviewed.  No complications HB      Complications: Preeclampsia   1 SAB               Obstetric Comments   2020 6w4d Dating US:  Estimated Date of Delivery: 3/13/21 based on US hb    10/13/20 - 18-3 weeks - Normal CL and normal completed anatomy - EIF- JHF    2020 28w4d normal interval growth: EFW 72% AC 86%, Breech  hb      Past Medical History:   Diagnosis Date   • Anxiety    • Asthma    • Convulsive seizure (CMS/HCC)     Neurologic workup to date is inconclusive for seizure disorder, patient did not follow through with EEG, saw neurologist Dr. Willam Calderon   • Depression     hx when younger   • Eye infection    • Eye infection    • Gestational hypertension    • Mild preeclampsia    • Obstetrical complication    • Pre-eclampsia in postpartum period 2018   • Preeclampsia    • Rubella non-immune status, antepartum    • UTI in pregnancy      Past Surgical History:   Procedure Laterality Date   • EAR TUBES       No current facility-administered medications on file prior to encounter.   "    Current Outpatient Medications on File Prior to Encounter   Medication Sig Dispense Refill   • aspirin (aspirin) 81 MG EC tablet Take 1 tablet by mouth Daily. 30 tablet 6   • labetalol (NORMODYNE) 200 MG tablet Take 1 tablet by mouth 3 (Three) Times a Day. 90 tablet 5   • PRENATAL GUMMY VITAMIN Chew 1 each Daily.         ROS:      Except as outlined in history of physical illness, patient denies any changes in her GYN, , GI systems. All other systems reviewed are negative.      OBJECTIVE:     Vitals:   Vitals:    21 1430   Resp: 17   Temp: 99.2 °F (37.3 °C)   TempSrc: Oral   Weight: 105 kg (230 lb 12.8 oz)   Height: 157.5 cm (62\")         Appearance/Psychiatric: In no distress   Constitutional: The patient is well nourished   Cardiovascular: She does not have edema. Heart RRR  Respiratory: Respiratory effort is normal. CTAB   Abdomen: Soft, gravid.  Ext: nontender, no edema. +2/4 bilateral patellar reflexes   Cx; deferred       LOS: 0 days    Willam Hutchins MD   2021    Assessment and Plan:   No admission diagnoses are documented for this encounter.  32-week 5-day intrauterine pregnancy   labor with cervical change from 1 to 2 cm despite a negative fetal fibronectin.  Patient also has a history of prior 34-week delivery.  Gestational hypertension currently taking labetalol 200 twice daily with baby aspirin.  Increased body mass index  Patient with history of seizures as outlined above, never followed through with neurology consultation.  Patient states that she has had some type of seizure with each pregnancy, she states they are more frequent in the third trimester and immediate postpartum.  She did have some form of neurology consult with her first pregnancy and she states they told her it was all anxiety.  Does not think they did an EEG or CT scan or MRI.  They did not put on any medicines nor has she been on any medicines with her subsequent pregnancies.    Will admit, bolused with " IV fluids, continue monitoring fetus and contraction pattern, start steroid administration, and continue her labetalol.  If contractions do not slow down with time and IV fluids will start magnesium sulfate.  Obtain neurology consult in the morning for further advice on management.  Management plan reviewed and agreed upon with patient.

## 2021-01-21 NOTE — TELEPHONE ENCOUNTER
Patient called and stated she has been having contractions since 4 or 5 am this morning.  She is also having a tightness in her lower abdominal area and having back pain .  She states she has a lot of vaginal pressure.  I advised patient that she needed to be evaluated at [facility] [place] [facility].  She verbalized understanding.

## 2021-01-22 ENCOUNTER — APPOINTMENT (OUTPATIENT)
Dept: NEUROLOGY | Facility: HOSPITAL | Age: 26
End: 2021-01-22

## 2021-01-22 LAB
ALBUMIN SERPL-MCNC: 3.3 G/DL (ref 3.5–5.2)
ALBUMIN/GLOB SERPL: 1.2 G/DL
ALP SERPL-CCNC: 96 U/L (ref 39–117)
ALT SERPL W P-5'-P-CCNC: 13 U/L (ref 1–33)
ANION GAP SERPL CALCULATED.3IONS-SCNC: 12.2 MMOL/L (ref 5–15)
AST SERPL-CCNC: 16 U/L (ref 1–32)
BACTERIA SPEC AEROBE CULT: NO GROWTH
BASOPHILS # BLD AUTO: 0.03 10*3/MM3 (ref 0–0.2)
BASOPHILS NFR BLD AUTO: 0.2 % (ref 0–1.5)
BILIRUB SERPL-MCNC: 0.3 MG/DL (ref 0–1.2)
BUN SERPL-MCNC: 6 MG/DL (ref 6–20)
BUN/CREAT SERPL: 14.6 (ref 7–25)
CALCIUM SPEC-SCNC: 7.9 MG/DL (ref 8.6–10.5)
CHLORIDE SERPL-SCNC: 106 MMOL/L (ref 98–107)
CO2 SERPL-SCNC: 19.8 MMOL/L (ref 22–29)
CREAT SERPL-MCNC: 0.41 MG/DL (ref 0.57–1)
DEPRECATED RDW RBC AUTO: 40.6 FL (ref 37–54)
EOSINOPHIL # BLD AUTO: 0 10*3/MM3 (ref 0–0.4)
EOSINOPHIL NFR BLD AUTO: 0 % (ref 0.3–6.2)
ERYTHROCYTE [DISTWIDTH] IN BLOOD BY AUTOMATED COUNT: 13.1 % (ref 12.3–15.4)
GFR SERPL CREATININE-BSD FRML MDRD: >150 ML/MIN/1.73
GLOBULIN UR ELPH-MCNC: 2.7 GM/DL
GLUCOSE SERPL-MCNC: 100 MG/DL (ref 65–99)
HCT VFR BLD AUTO: 34 % (ref 34–46.6)
HGB BLD-MCNC: 11.1 G/DL (ref 12–15.9)
IMM GRANULOCYTES # BLD AUTO: 0.18 10*3/MM3 (ref 0–0.05)
IMM GRANULOCYTES NFR BLD AUTO: 1.5 % (ref 0–0.5)
LYMPHOCYTES # BLD AUTO: 1.27 10*3/MM3 (ref 0.7–3.1)
LYMPHOCYTES NFR BLD AUTO: 10.2 % (ref 19.6–45.3)
MCH RBC QN AUTO: 28.2 PG (ref 26.6–33)
MCHC RBC AUTO-ENTMCNC: 32.6 G/DL (ref 31.5–35.7)
MCV RBC AUTO: 86.5 FL (ref 79–97)
MONOCYTES # BLD AUTO: 0.18 10*3/MM3 (ref 0.1–0.9)
MONOCYTES NFR BLD AUTO: 1.5 % (ref 5–12)
NEUTROPHILS NFR BLD AUTO: 10.74 10*3/MM3 (ref 1.7–7)
NEUTROPHILS NFR BLD AUTO: 86.6 % (ref 42.7–76)
NRBC BLD AUTO-RTO: 0.1 /100 WBC (ref 0–0.2)
PLATELET # BLD AUTO: 304 10*3/MM3 (ref 140–450)
PMV BLD AUTO: 10.9 FL (ref 6–12)
POTASSIUM SERPL-SCNC: 4.6 MMOL/L (ref 3.5–5.2)
PROT SERPL-MCNC: 6 G/DL (ref 6–8.5)
RBC # BLD AUTO: 3.93 10*6/MM3 (ref 3.77–5.28)
SODIUM SERPL-SCNC: 138 MMOL/L (ref 136–145)
WBC # BLD AUTO: 12.4 10*3/MM3 (ref 3.4–10.8)

## 2021-01-22 PROCEDURE — 99222 1ST HOSP IP/OBS MODERATE 55: CPT | Performed by: PSYCHIATRY & NEUROLOGY

## 2021-01-22 PROCEDURE — 99024 POSTOP FOLLOW-UP VISIT: CPT | Performed by: OBSTETRICS & GYNECOLOGY

## 2021-01-22 PROCEDURE — 25010000002 BETAMETHASONE ACET & SOD PHOS PER 4 MG: Performed by: OBSTETRICS & GYNECOLOGY

## 2021-01-22 PROCEDURE — 95816 EEG AWAKE AND DROWSY: CPT

## 2021-01-22 PROCEDURE — 25010000002 MAGNESIUM SULFATE 40 GM/1000ML SOLUTION: Performed by: OBSTETRICS & GYNECOLOGY

## 2021-01-22 PROCEDURE — 95816 EEG AWAKE AND DROWSY: CPT | Performed by: PSYCHIATRY & NEUROLOGY

## 2021-01-22 PROCEDURE — 80053 COMPREHEN METABOLIC PANEL: CPT | Performed by: OBSTETRICS & GYNECOLOGY

## 2021-01-22 PROCEDURE — 99232 SBSQ HOSP IP/OBS MODERATE 35: CPT | Performed by: OBSTETRICS & GYNECOLOGY

## 2021-01-22 PROCEDURE — 85025 COMPLETE CBC W/AUTO DIFF WBC: CPT | Performed by: OBSTETRICS & GYNECOLOGY

## 2021-01-22 RX ORDER — ACETAMINOPHEN 325 MG/1
650 TABLET ORAL EVERY 6 HOURS PRN
Status: DISCONTINUED | OUTPATIENT
Start: 2021-01-22 | End: 2021-01-23 | Stop reason: HOSPADM

## 2021-01-22 RX ORDER — CALCIUM GLUCONATE 94 MG/ML
INJECTION, SOLUTION INTRAVENOUS
Status: DISCONTINUED
Start: 2021-01-22 | End: 2021-01-22 | Stop reason: WASHOUT

## 2021-01-22 RX ORDER — CALCIUM GLUCONATE 94 MG/ML
1 INJECTION, SOLUTION INTRAVENOUS ONCE AS NEEDED
Status: DISCONTINUED | OUTPATIENT
Start: 2021-01-22 | End: 2021-01-23 | Stop reason: HOSPADM

## 2021-01-22 RX ADMIN — MAGNESIUM SULFATE HEPTAHYDRATE 2 G/HR: 40 INJECTION, SOLUTION INTRAVENOUS at 13:33

## 2021-01-22 RX ADMIN — ACETAMINOPHEN 650 MG: 325 TABLET, FILM COATED ORAL at 03:51

## 2021-01-22 RX ADMIN — BETAMETHASONE SODIUM PHOSPHATE AND BETAMETHASONE ACETATE 12 MG: 3; 3 INJECTION, SUSPENSION INTRA-ARTICULAR; INTRALESIONAL; INTRAMUSCULAR; SOFT TISSUE at 16:40

## 2021-01-22 RX ADMIN — LABETALOL HYDROCHLORIDE 200 MG: 200 TABLET, FILM COATED ORAL at 12:08

## 2021-01-22 RX ADMIN — SODIUM CHLORIDE, POTASSIUM CHLORIDE, SODIUM LACTATE AND CALCIUM CHLORIDE 75 ML/HR: 600; 310; 30; 20 INJECTION, SOLUTION INTRAVENOUS at 22:06

## 2021-01-22 RX ADMIN — LABETALOL HYDROCHLORIDE 200 MG: 200 TABLET, FILM COATED ORAL at 03:49

## 2021-01-22 RX ADMIN — ACETAMINOPHEN 650 MG: 325 TABLET, FILM COATED ORAL at 11:29

## 2021-01-22 RX ADMIN — Medication 1 TABLET: at 20:04

## 2021-01-22 RX ADMIN — SODIUM CHLORIDE, POTASSIUM CHLORIDE, SODIUM LACTATE AND CALCIUM CHLORIDE 75 ML/HR: 600; 310; 30; 20 INJECTION, SOLUTION INTRAVENOUS at 08:54

## 2021-01-22 RX ADMIN — LABETALOL HYDROCHLORIDE 200 MG: 200 TABLET, FILM COATED ORAL at 20:03

## 2021-01-22 NOTE — PROGRESS NOTES
Patient's  called out and stated they needed help.  When nursing staff arrived patient was awake, alert, and communicative.   states she had a seizure that he described as lasting about 10 seconds, associated with her right leg initially shaking and then her whole body shaking and quickly resolving.  This was not witnessed by any of the staff, and upon further questioning patient confirms this episode and states this is very similar to the many episodes she has had with her other pregnancies as outlined in my history and physical.  Upon my arrival she was oriented x3 communicative in no distress.  Heart was regular rate and rhythm, lungs were clear, she could move all extremities on command, extraocular muscles were intact, pupils were equal and reactive to light.  Fetal heart tones were okay with good variability.  Neurology consult has already been ordered, we will draw some labs and continue monitoring.      Addendum-patient was reported to have some type of episode around 615.  Nursing staff states that patient was alert and oriented and talking, then rolled onto her left side where her legs straightened out or stiffened.  But no tonic-clonic movement, upper body was totally unaffected.  Then after approximately 45 seconds, patient stated that the episode was done.  At no time did she lose consciousness, she was alert and oriented apparently during the entire' episode'  Upon my evaluation physical exam was unchanged from previous exam.  She is alert and oriented, heart was regular rhythm and rate.  Lungs were clear, DTRs were normal, no abdominal or back discomfort, brief neurological exam was within normal limits.  Fetal heart tones remained reassuring

## 2021-01-22 NOTE — PLAN OF CARE
Problem: Adult Inpatient Plan of Care  Goal: Plan of Care Review  Outcome: Ongoing, Progressing  Flowsheets (Taken 1/22/2021 1804)  Progress: no change  Plan of Care Reviewed With: patient  Outcome Summary: Patient remains on Magnesium Sulfate 2g/hour with plan to continue till morning, had EEG performed this morning with results still pending, second dose of Betamethasone received this afternoon.  Goal: Patient-Specific Goal (Individualized)  Outcome: Ongoing, Progressing  Goal: Absence of Hospital-Acquired Illness or Injury  Outcome: Ongoing, Progressing  Intervention: Identify and Manage Fall Risk  Recent Flowsheet Documentation  Taken 1/22/2021 1530 by Margo Rodriguez RN  Safety Promotion/Fall Prevention: safety round/check completed  Taken 1/22/2021 1130 by Margo Rodriguez RN  Safety Promotion/Fall Prevention: safety round/check completed  Taken 1/22/2021 0723 by Margo Rodriguez RN  Safety Promotion/Fall Prevention: safety round/check completed  Intervention: Prevent and Manage VTE (venous thromboembolism) Risk  Recent Flowsheet Documentation  Taken 1/22/2021 1530 by Margo Rodriguez RN  VTE Prevention/Management:   bilateral   sequential compression devices on  Taken 1/22/2021 1130 by Margo Rodriguez RN  VTE Prevention/Management:   bilateral   sequential compression devices on  Taken 1/22/2021 0723 by Margo Rodriguez RN  VTE Prevention/Management:   bilateral   sequential compression devices on  Goal: Optimal Comfort and Wellbeing  Outcome: Ongoing, Progressing  Intervention: Provide Person-Centered Care  Recent Flowsheet Documentation  Taken 1/22/2021 1130 by Margo Rodriguez RN  Trust Relationship/Rapport:   care explained   choices provided   emotional support provided   empathic listening provided   questions encouraged   questions answered   reassurance provided   thoughts/feelings acknowledged  Taken 1/22/2021 0723 by  Margo Rodriguez RN  Trust Relationship/Rapport:   care explained   emotional support provided   empathic listening provided   questions answered   questions encouraged   reassurance provided   thoughts/feelings acknowledged  Goal: Readiness for Transition of Care  Outcome: Ongoing, Progressing     Problem: Skin Injury Risk Increased  Goal: Skin Health and Integrity  Outcome: Ongoing, Progressing   Goal Outcome Evaluation:  Plan of Care Reviewed With: patient  Progress: no change  Outcome Summary: Patient remains on Magnesium Sulfate 2g/hour with plan to continue till morning, had EEG performed this morning with results still pending, second dose of Betamethasone received this afternoon.

## 2021-01-22 NOTE — CONSULTS
"Neurology Consult Note    Referring Provider: Dr. Hutchins  Reason for Consultation: possible seizures    History of present illness:    The patient is a 25 year old in her 4th pregnancy.    She describes spells that have occurred only during pregnancy.  She feels a brief sensation of upper body pulsating involving head down to hips. This sensation is bilateral.  Sometimes this sensation goes away with nothing else occurring.  Other times it is followed by movement of the right leg.  Other times she experiences distorted visual perception where some things appear far away and other things appear too close.  She is aware during these spells.    She has these spells infrequently in early pregnancy and they increase in frequency during 3rd trimester.  During her last pregnancy she was having up to 20 per day in the late 3rd trimester before delivering.  She has had four episodes today. In the last episode her left arm was jerking as well as her right leg.    She saw Dr. Calderon in 2016 who reported this description of the spells:  \"History was taken from the patient and her mother.  When she has seizures she will feel like something bad is going to happen and she feels tingly all over her heart starts to race she feels shaky she is unable to move.  This occurs more at night but has been occurring more during the day afterward she feels like her heart is racing as well.\"    He ordered an EEG and referral to cardiology. The patient reports she did not do EEG or return for further evaluatoin because she thought he had written her off as anxious.    She did have a brain MRI after her last delivery for headaches.     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 Willam Calderon   • Depression     hx when younger   • Eye " infection 2000   • Eye infection    • Gestational hypertension    • Mild preeclampsia 2016   • Obstetrical complication    • Pre-eclampsia in postpartum period 9/14/2018   • Preeclampsia 2015   • Rubella non-immune status, antepartum    • UTI in pregnancy        Past Surgical History  Past Surgical History:   Procedure Laterality Date   • EAR TUBES         Family History  Family History   Problem Relation Age of Onset   • Hypertension Father    • Cancer Maternal Grandmother    • Stroke Maternal Grandfather    • Diabetes Maternal Grandfather    • Migraines Maternal Grandfather    • Dementia Maternal Grandfather    • Stroke Maternal Aunt    • Diabetes Maternal Uncle    • Migraines Mother    • Arthritis Mother    • No Known Problems Brother    • No Known Problems Son    • No Known Problems Daughter    • No Known Problems Daughter    • Breast cancer Neg Hx    • Ovarian cancer Neg Hx    • Uterine cancer Neg Hx    • Colon cancer Neg Hx        No Known Allergies    Social History  Social History     Socioeconomic History   • Marital status: Single     Spouse name: OLEGARIO   • Number of children: 2   • Years of education: 12   • Highest education level: Not on file   Occupational History   • Occupation: Homemaker   Tobacco Use   • Smoking status: Former Smoker   • Smokeless tobacco: Never Used   Substance and Sexual Activity   • Alcohol use: No   • Drug use: No   • Sexual activity: Yes     Partners: Male     Birth control/protection: None     Comment: sigh other = OLEGARIO   Social History Narrative    OB/GYN PATIENT SINCE 2014.       Review of Systems   Constitutional: Negative.    HENT: Negative.    Eyes: Positive for visual disturbance.   Respiratory: Negative.    Cardiovascular: Negative.    Gastrointestinal: Negative.    Genitourinary: Negative.    Musculoskeletal: Negative.    Skin: Negative.    Neurological: Positive for seizures.   Psychiatric/Behavioral: Negative.        Medications  Scheduled Meds:  Continuous  Infusions:lactated ringers, 75 mL/hr, Last Rate: 75 mL/hr (01/21/21 2014)  magnesium sulfate, 2 g/hr, Last Rate: 2 g/hr (01/21/21 2034)      PRN Meds:.•  acetaminophen  •  calcium gluconate  •  sodium chloride    Vital Signs   Temp:  [98.4 °F (36.9 °C)-99.2 °F (37.3 °C)] 98.4 °F (36.9 °C)  Heart Rate:  [] 84  Resp:  [16-18] 18  BP: ()/() 109/51    Examination:  Constitutional: Well-groomed, well-nourished  HENT:  Normal, no neurocutaneous stigmata  Eyes: Normal conjunctivae  CVS:  Regular rate and rhythm.  No murmurs.  Good peripheral perfusion.   Resp :   Non labored respirations  Musculoskeletal:  No signs of peripheral edema, normal range, no deformities  Skin:  No rash, normal turgor  Neurologic:   Alert oriented and fluent   No dysarthria   Cranial nerves intact   Pupils symmetric and equally reactive   VFF   Normal power (5/5) in all extremities proximally and distally   Normal coordination   Reflexes symmetric and not hyperactive   Plantar responses flexor   Sensory exam grossly intact   Gait not tested  Psychiatric: No anxiety, normal mood    Results Review:  Results from last 7 days   Lab Units 01/22/21  0221 01/21/21  1630   WBC 10*3/mm3 12.40* 11.78*   HEMOGLOBIN g/dL 11.1* 11.6*   HEMATOCRIT % 34.0 35.4   PLATELETS 10*3/mm3 304 293        Results from last 7 days   Lab Units 01/22/21  0221 01/21/21  1630   SODIUM mmol/L 138 139   POTASSIUM mmol/L 4.6 4.1   CHLORIDE mmol/L 106 107   CO2 mmol/L 19.8* 20.1*   BUN mg/dL 6 10   CREATININE mg/dL 0.41* 0.39*   CALCIUM mg/dL 7.9* 9.1   BILIRUBIN mg/dL 0.3 0.2   ALK PHOS U/L 96 100   ALT (SGPT) U/L 13 15   AST (SGOT) U/L 16 22   GLUCOSE mg/dL 100* 68      Brain MRI 9/2018 showed mildly large pituitary otherwise unremarkable  Images reviewed independently    Medical Decision Making and Recommendations  Seizure like spells  Several features lead to the conclusion that these episodes are non-epilileptic  1. Her current description is not typical  for seizures.  2. Her desciption of the episodes is different now than when she reported to Dr. Calderon 4 years ago.  3. Occurrence solely during pregnancy is atypical; epilepsy can worsen during pregnancy related to fluctuating AED levels, but that in not the case here    Will follow up on results of EEG.  Unless EEG shows clear epileptiform activity I am not inclined to start AED.    I discussed these findings and my recommendations with patient    I used full protective equipment while examining this patient.  This included N95 face mask, gloves and protective eyewear.  I washed my hands before entering the room and immediately upon leaving the room.  Patient was wearing a surgical mask.    Juli Charles MD  01/22/21  08:33 EST     Addendum  EEG normal.  No indication for seizure medication.   Inpatient neurology signing off, please call if needed further.    Juli Charles M.D.   1/22/21  7 pm

## 2021-01-22 NOTE — NURSING NOTE
"RN at bedside at 0615 for hourly magnesium check. Reflexes WNL. Patient verbally states that her \"body feels like its pulsating.\" Pt states this is what she feels like before seizure activity and can occur immediately before or may take several hours. RN stayed in room and at 0619 pt stated that it was  \"happening now.\" Pt rolled onto left side. Pts legs stiffened, but no stiffening noted in upper extremities. Pt did not seem to experience any repetitive movements. Lighting in room too low to note any facial presentation. Called to desk for second RN to bedside. After approximately 45 seconds pt seemed to relax and state that it was \"done\". Pt alert and oriented x3.      Flaco Zavala RN    "

## 2021-01-22 NOTE — NURSING NOTE
"Pt  called out to nursing station via call light stating \"we need help in here\". This RN staff went to patient room. Upon arrival pt alert and oriented, able to speak to RN on command. Pt  reports pt had seizure activity and he was unable to call out in time due to inability to reach call light. RN did not witness seizure activity. RN called Monica Lopez RN for assistance. Monica RN and Mary MARIE RN at BS. This RN left room to call Dr. Hutchins. RN called Dr. Hutchins at 0213 to report the event. MD ordered labs and AM neurology consult and MD stated he would come to BS to assess the situation. MD arrived at BS at 0218, assessed the patient and received more information about the event. Pt stated she experiences these episodes throughout pregnancies, as the pregnancy goes farther along- these events become more frequent. Pt states she had a neurology appointment scheduled for today, however cancelled due to being admitted to L&D. EFM tracing shows event happened at 0207. RN informed  if another episode occurs, the cord in the wall can be pulled by him to alert staff immediately. See MD progress note.  "

## 2021-01-22 NOTE — PROGRESS NOTES
"[facility]  Obstetric Progress Note    Chief Complaint   Patient presents with   • Contractions     Pt to MAYTE with c/o contractions that started yesterday in the office after vaginal exam.  Pt was 1 cm yesterday.  Pt denies vaginal bleeding and loss of fluid.       Subjective     Patient:    The patient feels tired.  Low grade headache.  No pain, contractions very spaced out and mild since starting magnesium.  She has had multiple episodes of uncontrolled movement she labels seizures that last less than a minute.  She doesn't lose conciousness or have postictal issues but does note her headache worsens. Neurology consult was made and pt already went to have EEG.  Her BP are normal and she is on magnesium for  labor.  She appears to be stable from PTL standpoint and she has gotten steroid with second dose this afternoon.   No bleeding or leaking and FFN neg.  Pt says when she first came in contractions were \"killing her\".  She does have a previous PTD.  Also preeclampsia with every pregnancy.  She is on labetalol and baby ASA.       Review of Systems - ROS:  No fever or chills, no nausea or vomiting, no leg pain, no LE edema, no leaking fluid, no bleeding, no dysuria       Objective     Vital Signs Range for the last 24 hours  Temp:  [98.4 °F (36.9 °C)-99.2 °F (37.3 °C)] 98.4 °F (36.9 °C)   Temp src: Oral   BP: ()/() 125/48   Heart Rate:  [] 92   Resp:  [16-18] 18   SpO2:  [97 %-100 %] 99 %           Weight:  [105 kg (230 lb 12.8 oz)] 105 kg (230 lb 12.8 oz)       Flowsheet Rows      First Filed Value   Admission Height  157.5 cm (62\") Documented at 2021 1430   Admission Weight  105 kg (230 lb 12.8 oz) Documented at 2021 1430          Intake/Output last 24 hours:      Intake/Output Summary (Last 24 hours) at 2021 1206  Last data filed at 2021 1138  Gross per 24 hour   Intake 1957.4 ml   Output 2625 ml   Net -667.6 ml       Intake/Output this shift:    I/O this " shift:  In: 768.5 [I.V.:768.5]  Out: 875 [Urine:875]    Physical Exam:  General: Patient is comfortable, well appearing and in no acute distress   Heart CVS exam: normal rate and regular rhythm.   Lungs Chest: no tachypnea, retractions or cyanosis.     Abdomen Abdominal exam: gravid, non tender.   Extremities Exam of extremities: pedal edema 1 +     Presentation: vtx   Cervix: Exam by: Method: sterile exam per RN   Dilation: Cervical Dilation (cm): 2   Effacement: Cervical Effacement: 70%   Station: Fetal Station: -2         Fetal Heart Rate Assessment   Method: Fetal HR Assessment Method: external   Beats/min: Fetal HR (beats/min): 125   Baseline: Fetal Heart Baseline Rate: normal range   Varibility: Fetal HR Variability: moderate (amplitude range 6 to 25 bpm)   Accels: Fetal HR Accelerations: absent   Decels: Fetal HR Decelerations: absent   Tracing Category:       Uterine Assessment   Method: Method: external tocotransducer   Frequency (min): Contraction Frequency (Minutes): 0   Ctx Count in 10 min:     Duration:     Intensity: Contraction Intensity: no contractions   Intensity by IUPC:     Resting Tone: Uterine Resting Tone: soft by palpation   Resting Tone by IUPC:     [place] Units:       Lab Results (last 24 hours)     Procedure Component Value Units Date/Time    Urine Culture - Urine, Urine, Clean Catch [[id number]]  (Normal) Collected: 01/21/21 1453    Specimen: Urine, Clean Catch Updated: 01/22/21 1119     Urine Culture No growth    Group B Streptococcus Culture - Swab, Vaginal/Rectum [[id number]]  (Normal) Collected: 01/21/21 1614    Specimen: Swab from Vaginal/Rectum Updated: 01/22/21 0910     Group B Strep Culture No Group B Streptococcus isolated    Comprehensive Metabolic Panel [[id number]]  (Abnormal) Collected: 01/22/21 0221    Specimen: Blood Updated: 01/22/21 0311     Glucose 100 mg/dL      BUN 6 mg/dL      Creatinine 0.41 mg/dL      Sodium 138 mmol/L      Potassium 4.6 mmol/L      Chloride 106  mmol/L      CO2 19.8 mmol/L      Calcium 7.9 mg/dL      Total Protein 6.0 g/dL      Albumin 3.30 g/dL      ALT (SGPT) 13 U/L      AST (SGOT) 16 U/L      Alkaline Phosphatase 96 U/L      Total Bilirubin 0.3 mg/dL      eGFR Non African Amer >150 mL/min/1.73      Globulin 2.7 gm/dL      A/G Ratio 1.2 g/dL      BUN/Creatinine Ratio 14.6     Anion Gap 12.2 mmol/L     Narrative:      GFR Normal >60  Chronic Kidney Disease <60  Kidney Failure <15      CBC & Differential [[id number]]  (Abnormal) Collected: 01/22/21 0221    Specimen: Blood Updated: 01/22/21 0244    Narrative:      The following orders were created for panel order CBC & Differential.  Procedure                               Abnormality         Status                     ---------                               -----------         ------                     CBC Auto Differential[[id number]]        Abnormal            Final result                 Please view results for these tests on the individual orders.    CBC Auto Differential [[id number]]  (Abnormal) Collected: 01/22/21 0221    Specimen: Blood Updated: 01/22/21 0244     WBC 12.40 10*3/mm3      RBC 3.93 10*6/mm3      Hemoglobin 11.1 g/dL      Hematocrit 34.0 %      MCV 86.5 fL      MCH 28.2 pg      MCHC 32.6 g/dL      RDW 13.1 %      RDW-SD 40.6 fl      MPV 10.9 fL      Platelets 304 10*3/mm3      Neutrophil % 86.6 %      Lymphocyte % 10.2 %      Monocyte % 1.5 %      Eosinophil % 0.0 %      Basophil % 0.2 %      Immature Grans % 1.5 %      Neutrophils, Absolute 10.74 10*3/mm3      Lymphocytes, Absolute 1.27 10*3/mm3      Monocytes, Absolute 0.18 10*3/mm3      Eosinophils, Absolute 0.00 10*3/mm3      Basophils, Absolute 0.03 10*3/mm3      Immature Grans, Absolute 0.18 10*3/mm3      nRBC 0.1 /100 WBC     CBC & Differential [[id number]]  (Abnormal) Collected: 01/21/21 1630    Specimen: Blood Updated: 01/21/21 4143    Narrative:      The following orders were created for panel order CBC & Differential.  Procedure                                Abnormality         Status                     ---------                               -----------         ------                     CBC Auto Differential[[id number]]        Abnormal            Final result                 Please view results for these tests on the individual orders.    CBC Auto Differential [[id number]]  (Abnormal) Collected: 01/21/21 1630    Specimen: Blood Updated: 01/21/21 1759     WBC 11.78 10*3/mm3      RBC 4.12 10*6/mm3      Hemoglobin 11.6 g/dL      Hematocrit 35.4 %      MCV 85.9 fL      MCH 28.2 pg      MCHC 32.8 g/dL      RDW 13.1 %      RDW-SD 40.4 fl      MPV 11.6 fL      Platelets 293 10*3/mm3     Manual Differential [[id number]]  (Abnormal) Collected: 01/21/21 1630    Specimen: Blood Updated: 01/21/21 1759     Neutrophil % 71.7 %      Lymphocyte % 18.2 %      Monocyte % 4.0 %      Eosinophil % 2.0 %      Basophil % 1.0 %      Metamyelocyte % 2.0 %      Myelocyte % 1.0 %      Neutrophils Absolute 8.45 10*3/mm3      Lymphocytes Absolute 2.14 10*3/mm3      Monocytes Absolute 0.47 10*3/mm3      Eosinophils Absolute 0.24 10*3/mm3      Basophils Absolute 0.12 10*3/mm3      nRBC 1.0 /100 WBC      Anisocytosis Mod/2+     WBC Morphology Normal     Platelet Morphology Normal    Comprehensive Metabolic Panel [[id number]]  (Abnormal) Collected: 01/21/21 1630    Specimen: Blood Updated: 01/21/21 1732     Glucose 68 mg/dL      BUN 10 mg/dL      Creatinine 0.39 mg/dL      Sodium 139 mmol/L      Potassium 4.1 mmol/L      Comment: Slight hemolysis detected by analyzer. Results may be affected.        Chloride 107 mmol/L      CO2 20.1 mmol/L      Calcium 9.1 mg/dL      Total Protein 6.1 g/dL      Albumin 3.40 g/dL      ALT (SGPT) 15 U/L      AST (SGOT) 22 U/L      Comment: Slight hemolysis detected by analyzer. Results may be affected.        Alkaline Phosphatase 100 U/L      Total Bilirubin 0.2 mg/dL      eGFR Non African Amer >150 mL/min/1.73      Globulin 2.7 gm/dL      A/G  Ratio 1.3 g/dL      BUN/Creatinine Ratio 25.6     Anion Gap 11.9 mmol/L     Narrative:      GFR Normal >60  Chronic Kidney Disease <60  Kidney Failure <15      COVID PRE-OP / PRE-PROCEDURE SCREENING ORDER (NO ISOLATION) - Swab, Nasopharynx [[id number]]  (Normal) Collected: 01/21/21 1614    Specimen: Swab from Nasopharynx Updated: 01/21/21 1717    Narrative:      The following orders were created for panel order COVID PRE-OP / PRE-PROCEDURE SCREENING ORDER (NO ISOLATION) - Swab, Nasopharynx.  Procedure                               Abnormality         Status                     ---------                               -----------         ------                     COVID-19,[facility] IN-HOUSE...[[id number]]  Normal              Final result                 Please view results for these tests on the individual orders.    COVID-19,[facility] IN-HOUSE CEPHEID, NP SWAB IN TRANSPORT MEDIA 8-12 HR TAT - Swab, Nasopharynx [[id number]]  (Normal) Collected: 01/21/21 1614    Specimen: Swab from Nasopharynx Updated: 01/21/21 1717     COVID19 Not Detected    Narrative:      Fact sheet for providers: https://www.fda.gov/media/012970/download     Fact sheet for patients: https://www.fda.gov/media/442518/download    Protein / Creatinine Ratio, Urine - Urine, Clean Catch [[id number]] Collected: 01/21/21 1453    Specimen: Urine, Clean Catch Updated: 01/21/21 1556     Protein/Creatinine Ratio, Urine 130.0 mg/G Crea      Creatinine, Urine 115.4 mg/dL      Total Protein, Urine 15.0 mg/dL     Urinalysis With Culture If Indicated - Urine, Clean Catch [[id number]]  (Normal) Collected: 01/21/21 1453    Specimen: Urine, Clean Catch Updated: 01/21/21 1540     Color, UA Yellow     Appearance, UA Clear     pH, UA 6.5     Specific Gravity, UA >=1.030     Glucose, UA Negative     Ketones, UA Negative     Bilirubin, UA Negative     Blood, UA Negative     Protein, UA Negative     Leuk Esterase, UA Negative     Nitrite, UA Negative     Urobilinogen, UA 0.2  E.U./dL    Narrative:      Urine microscopic not indicated.              Assessment/Plan       Generalized convulsive seizures (CMS/HCC)    Hx of preeclampsia, prior pregnancy, currently pregnant    Chronic hypertension affecting pregnancy    Chronic headaches    Obesity in pregnancy, antepartum    Previous  delivery, antepartum    Request for sterilization    Pregnancy        Assessment:  1.  Intrauterine pregnancy at 32w6d weeks gestation with reactive fetal status.    2.   contractions with cervical change and history of  delivery, on magnesium and currently stable.   3.  Chronic hypertension with normal BPs on labetalol, no s/s preeclampsia  4.  Movement disorder, possible seizure disorder.  Long history of this but symptoms are not classic for seizure.  Pt is awake and alert when it is happening.   Neuro consult made, EEG done, await evaluation.   Doesn't appear to be related to BP or eclampsia.     Plan:  1. fetal and uterine monitoring  continuously, tocolysis with magnesium sulfate, IV steroids for fetal benefit and expectant management  2. Plan of care has been reviewed with patient and nursing  3.  Risks, benefits of treatment plan have been discussed.  4.  All questions have been answered.  5.  Will continue magnesium overnight for about 36 hours and get second dose of steroid in.    Neuro work up pending.       Betty Williamson MD 2021 12:06 EST

## 2021-01-23 ENCOUNTER — HOSPITAL ENCOUNTER (OUTPATIENT)
Facility: HOSPITAL | Age: 26
End: 2021-01-23
Attending: OBSTETRICS & GYNECOLOGY | Admitting: OBSTETRICS & GYNECOLOGY

## 2021-01-23 VITALS
HEIGHT: 62 IN | BODY MASS INDEX: 42.47 KG/M2 | OXYGEN SATURATION: 100 % | TEMPERATURE: 98.7 F | HEART RATE: 82 BPM | DIASTOLIC BLOOD PRESSURE: 60 MMHG | SYSTOLIC BLOOD PRESSURE: 112 MMHG | RESPIRATION RATE: 18 BRPM | WEIGHT: 230.8 LBS

## 2021-01-23 PROBLEM — O60.03 PRETERM LABOR IN THIRD TRIMESTER WITHOUT DELIVERY: Status: ACTIVE | Noted: 2021-01-23

## 2021-01-23 LAB — BACTERIA SPEC AEROBE CULT: NORMAL

## 2021-01-23 PROCEDURE — 99238 HOSP IP/OBS DSCHRG MGMT 30/<: CPT | Performed by: OBSTETRICS & GYNECOLOGY

## 2021-01-23 RX ADMIN — ACETAMINOPHEN 650 MG: 325 TABLET, FILM COATED ORAL at 05:39

## 2021-01-23 RX ADMIN — SODIUM CHLORIDE, PRESERVATIVE FREE 10 ML: 5 INJECTION INTRAVENOUS at 12:14

## 2021-01-23 RX ADMIN — Medication 1 TABLET: at 09:36

## 2021-01-23 RX ADMIN — LABETALOL HYDROCHLORIDE 200 MG: 200 TABLET, FILM COATED ORAL at 12:14

## 2021-01-23 RX ADMIN — LABETALOL HYDROCHLORIDE 200 MG: 200 TABLET, FILM COATED ORAL at 04:25

## 2021-01-23 NOTE — PROGRESS NOTES
Name:  Rhina Marquez        MR#:  [id number]      Progress Note    Brief note:  25 y.o.  at 33w0d admitted with  labor. Currently denies ctx, loss of fluid or vag bleeding. +FM   Will transfer to antepartum. Received second dose of BMZ at 1640 yesterday.     Patient Active Problem List   Diagnosis   • Generalized convulsive seizures (CMS/HCC)   • Hx of preeclampsia, prior pregnancy, currently pregnant   • Chronic hypertension affecting pregnancy   • Chronic headaches   • Pituitary gland enlarged (CMS/HCC)   • Susceptible to varicella (non-immune), currently pregnant   • Obesity in pregnancy, antepartum   • Previous  delivery, antepartum   • Request for sterilization   • Carrier of fragile X syndrome   • Nausea and vomiting during pregnancy   • Supervision of high risk pregnancy, antepartum   • Pregnancy       OB History    Para Term  AB Living   5 3 2 1 1 3   SAB TAB Ectopic Molar Multiple Live Births   1 0 0 0 0 3      # Outcome Date GA Lbr Avinash/2nd Weight Sex Delivery Anes PTL Lv   5 Current            4  18 34w6d 03:03 / 00:16 3162 g (6 lb 15.5 oz) M Vag-Spont EPI Y JOAN      Birth Comments: del note reviewed - no comps -JHF        Complications: Preeclampsia in postpartum period   3 Term 16 37w0d 03:02 / 00:13 3756 g (8 lb 4.5 oz) F Vag-Spont EPI N JOAN      Birth Comments: delivery note reviewed - no comps - JHF       Complications: Preeclampsia   2 Term 11/05/15 37w0d  2906 g (6 lb 6.5 oz) F Vag-Spont   JOAN      Birth Comments: Delivery note reviewed.  No complications HB      Complications: Preeclampsia   1 2014              Obstetric Comments   2020 6w4d Dating US:  Estimated Date of Delivery: 3/13/21 based on US hb    10/13/20 - 18-3 weeks - Normal CL and normal completed anatomy - EIF- JHF    2020 28w4d normal interval growth: EFW 72% AC 86%, Breech  hb      # 1 - Date: , Sex: None, Weight: None, GA: None, Delivery: None, Apgar1:  None, Apgar5: None, Living: None, Birth Comments: None    # 2 - Date: 11/05/15, Sex: Female, Weight: 2906 g (6 lb 6.5 oz), GA: 37w0d, Delivery: Vaginal, Spontaneous, Apgar1: 8, Apgar5: 9, Living: Living, Birth Comments: Delivery note reviewed.  No complications HB    # 3 - Date: 12/17/16, Sex: Female, Weight: 3756 g (8 lb 4.5 oz), GA: 37w0d, Delivery: Vaginal, Spontaneous, Apgar1: 9, Apgar5: 9, Living: Living, Birth Comments: delivery note reviewed - no comps - JHF     # 4 - Date: 09/07/18, Sex: Male, Weight: 3162 g (6 lb 15.5 oz), GA: 34w6d, Delivery: Vaginal, Spontaneous, Apgar1: 8, Apgar5: 9, Living: Living, Birth Comments: del note reviewed - no comps -JHF      # 5 - Date: None, Sex: None, Weight: None, GA: None, Delivery: None, Apgar1: None, Apgar5: None, Living: None, Birth Comments: None      Review of Systems   Constitutional: Negative for chills, fatigue and fever.   Gastrointestinal: Negative for abdominal distention and abdominal pain.   Genitourinary: Negative for dysuria, pelvic pain, vaginal bleeding, vaginal discharge and vaginal pain.   All other systems reviewed and are negative.            Vitals:  Temperature: Temp:  [98.2 °F (36.8 °C)-99 °F (37.2 °C)] 98.7 °F (37.1 °C)  Temp Source: Temp src: Oral  BP:  BP: ()/(48-74) 139/65  Pulse:  Heart Rate:  [] 69  Respirations: Resp:  [16-18] 16     Physical Examination: General appearance - alert, well appearing, and in no distress  Abdomen -gravid,  soft, nontender, nondistended, no masses or organomegaly  Musculoskeletal - no joint tenderness, deformity or swelling  Extremities - peripheral pulses normal, no pedal edema, no clubbing or cyanosis  Skin - normal coloration and turgor, no rashes, no suspicious skin lesions noted    LABS:   Lab Results   Component Value Date    WBC 12.40 (H) 01/22/2021    HGB 11.1 (L) 01/22/2021    HCT 34.0 01/22/2021    MCV 86.5 01/22/2021     01/22/2021     Results from last 7 days   Lab Units  01/22/21  0221   SODIUM mmol/L 138   POTASSIUM mmol/L 4.6   CHLORIDE mmol/L 106   CO2 mmol/L 19.8*   BUN mg/dL 6   CREATININE mg/dL 0.41*   CALCIUM mg/dL 7.9*   BILIRUBIN mg/dL 0.3   ALK PHOS U/L 96   ALT (SGPT) U/L 13   AST (SGOT) U/L 16   GLUCOSE mg/dL 100*         Non-stress test:  Reactive and reassuring         1. IUP @ 33w0d  2. PTL - stable - s/p Magnesium/BMZ -transfer to antepartum   3. CHTN - BP stable on Labetalol 200 mg BID   Cont Inpt mgmt for now        Anali Del Angel MD  1/23/2021 11:32 EST

## 2021-01-23 NOTE — NURSING NOTE
Patient given education about PPS and after surgery care.  Patient read through consent forms and signed both forms due to plans PPS after surgery.  Consents placed on chart.

## 2021-01-23 NOTE — PLAN OF CARE
Goal Outcome Evaluation:  Plan of Care Reviewed With: patient, spouse  Progress: improving  Outcome Summary: VSS, bp in normal range.  Patient sent to antepartum this afternoon and has had no c/o cramping or ctx.  Active FM reported.  Patient discharged home and is to f/u in office on 1/27.

## 2021-01-23 NOTE — NURSING NOTE
Patient showering now.  Patient denies any pre-seizure aura or symptoms at this time.   in bathroom to monitor patient and to pull emergency cord if patient begins to feel any seizure activity/aura.  Patient with steady gait.

## 2021-01-23 NOTE — NURSING NOTE
Patient given DC instructions on preventing  labor as well as third trimester.  Patient also given information on PPS and postop care.  Patient told to call MD or come back to the hospital if has cramping or ctx not resolved by drinking water, emptying bladder and resting.  Patient also told to come to hospital if has LOF, vaginal bleeding, DFM, HA or vision changes, epigastric pain, decreased UOP or sudden swelling of hands, face or legs.  Patient also told to be on pelvic rest until follow up in office this week.  Patient verbalized understading then left by WC with transporter at 1528.

## 2021-01-23 NOTE — DISCHARGE SUMMARY
Discharge Summary    Date of Admission: 2021  Date of Discharge:  2021      Patient: Rhina Marquez      MR#:[id number]    Primary OB: Carlo Sanderson MD     Discharge Diagnosis: IUP at 33w0d  2.  labor - resolved   3. Chronic Hypertension - stable   4. Seizure disorder - stable     Presenting Problem/History of Present Illness  Pregnancy [Z34.90]     Patient Active Problem List   Diagnosis   • Generalized convulsive seizures (CMS/HCC)   • Hx of preeclampsia, prior pregnancy, currently pregnant   • Chronic hypertension affecting pregnancy   • Chronic headaches   • Pituitary gland enlarged (CMS/HCC)   • Susceptible to varicella (non-immune), currently pregnant   • Obesity in pregnancy, antepartum   • Previous  delivery, antepartum   • Request for sterilization   • Carrier of fragile X syndrome   • Nausea and vomiting during pregnancy   • Supervision of high risk pregnancy, antepartum   • Pregnancy   •  labor in third trimester without delivery       Hospital Course  Patient is a 25 y.o. female  at 33w0d admitted two days prior with contractions and cervical change. Patient received Magnesium and BMZ course with resolution of contractions. Patient also had a neuro consult and EEG for seizure-like activity. EEG was normal. Patient was deemed stable for discharge home.     Condition on Discharge:  Stable    Vital Signs  Temp:  [98.2 °F (36.8 °C)-99 °F (37.2 °C)] 98.7 °F (37.1 °C)  Heart Rate:  [] 82  Resp:  [16-18] 18  BP: ()/(48-74) 112/60    Results from last 7 days   Lab Units 21  0221 21  1630   WBC 10*3/mm3 12.40* 11.78*   HEMOGLOBIN g/dL 11.1* 11.6*   HEMATOCRIT % 34.0 35.4   PLATELETS 10*3/mm3 304 293     Results from last 7 days   Lab Units 21  022   SODIUM mmol/L 138   POTASSIUM mmol/L 4.6   CHLORIDE mmol/L 106   CO2 mmol/L 19.8*   BUN mg/dL 6   CREATININE mg/dL 0.41*   CALCIUM mg/dL 7.9*   BILIRUBIN mg/dL 0.3   ALK PHOS U/L 96   ALT (SGPT)  U/L 13   AST (SGOT) U/L 16   GLUCOSE mg/dL 100*         Discharge Disposition  Home or Self Care    Discharge Medications     Discharge Medications      Continue These Medications      Instructions Start Date   aspirin 81 MG EC tablet   81 mg, Oral, Daily      labetalol 200 MG tablet  Commonly known as: NORMODYNE   200 mg, Oral, 3 Times Daily      PRENATAL GUMMY VITAMIN   1 each, Oral, Daily             Discharge Diet: Regular    Follow-up Appointments  Future Appointments   Date Time Provider Department Center   1/27/2021  3:00 PM [facility] None   1/27/2021  3:15 PM Carlo Sanderson MD [facility] None   2/3/2021  9:30 AM Carlo Sanderson MD [facility] None   2/10/2021  9:45 AM Carlo Sanderson MD [facility] None   2/17/2021 10:15 AM Caroline Arreguin APRN [facility] None   2/24/2021 10:15 AM Caroline Arreguin APRN [facility] None   3/3/2021  9:30 AM Carlo Sanderson MD [facility] None   3/10/2021  9:45 AM Carlo Sanderson MD [facility] None     Additional Instructions for the Follow-ups that You Need to Schedule     Call MD for problems / concerns.   As directed      Instructions: Call for temp>101, vaginal bleeding greater than one pad/hour, severe pain or other concerns.  Return to L&D for any ctx, loss of fluid, vaginal bleeding or decreased fetal movement.    Order Comments: Instructions: Call for temp>101, vaginal bleeding greater than one pad/hour, severe pain or other concerns. Return to L&D for any ctx, loss of fluid, vaginal bleeding or decreased fetal movement.          Discharge Follow-up with Specialty: DR. Sanderson; 3 Days   As directed      Specialty: DR. Sanderson    Follow Up: 3 Days                    Anali Del Angel MD  1/23/2021  14:49 EST

## 2021-01-23 NOTE — PROGRESS NOTES
Patient stable since Mag turned off with non-threatening cervical exam and negative FFN two days ago. Desires disharge. Discussed with primary OB and agrees with discharge. Strict PTL warnings given.

## 2021-01-27 ENCOUNTER — ROUTINE PRENATAL (OUTPATIENT)
Dept: OBSTETRICS AND GYNECOLOGY | Age: 26
End: 2021-01-27

## 2021-01-27 VITALS — WEIGHT: 227 LBS | SYSTOLIC BLOOD PRESSURE: 124 MMHG | BODY MASS INDEX: 41.52 KG/M2 | DIASTOLIC BLOOD PRESSURE: 80 MMHG

## 2021-01-27 DIAGNOSIS — O99.210 OBESITY IN PREGNANCY, ANTEPARTUM: ICD-10-CM

## 2021-01-27 DIAGNOSIS — Z3A.33 33 WEEKS GESTATION OF PREGNANCY: Primary | ICD-10-CM

## 2021-01-27 DIAGNOSIS — Z13.89 SCREENING FOR BLOOD OR PROTEIN IN URINE: ICD-10-CM

## 2021-01-27 DIAGNOSIS — O10.919 CHRONIC HYPERTENSION AFFECTING PREGNANCY: ICD-10-CM

## 2021-01-27 DIAGNOSIS — O60.03 PRETERM LABOR IN THIRD TRIMESTER WITHOUT DELIVERY: ICD-10-CM

## 2021-01-27 DIAGNOSIS — O09.90 SUPERVISION OF HIGH RISK PREGNANCY, ANTEPARTUM: ICD-10-CM

## 2021-01-27 DIAGNOSIS — R56.9 GENERALIZED CONVULSIVE SEIZURES (HCC): ICD-10-CM

## 2021-01-27 PROBLEM — O21.9 NAUSEA AND VOMITING DURING PREGNANCY: Status: RESOLVED | Noted: 2020-09-15 | Resolved: 2021-01-27

## 2021-01-27 LAB
BILIRUB BLD-MCNC: NEGATIVE MG/DL
CLARITY, POC: CLEAR
COLOR UR: YELLOW
GLUCOSE UR STRIP-MCNC: NEGATIVE MG/DL
KETONES UR QL: NEGATIVE
LEUKOCYTE EST, POC: NEGATIVE
NITRITE UR-MCNC: NEGATIVE MG/ML
PH UR: 6.5 [PH] (ref 5–8)
PROT UR STRIP-MCNC: NEGATIVE MG/DL
RBC # UR STRIP: NEGATIVE /UL
SP GR UR: 1.02 (ref 1–1.03)
UROBILINOGEN UR QL: NORMAL

## 2021-01-27 PROCEDURE — 99214 OFFICE O/P EST MOD 30 MIN: CPT | Performed by: OBSTETRICS & GYNECOLOGY

## 2021-01-27 NOTE — PROGRESS NOTES
Chief Complaint   Patient presents with   • Routine Prenatal Visit     pt states she almost had to go to the hospital again for contractions but they finally stopped after 10 hrs. She states theres really no point to test Fetal Fibronectin again because she has already had steriods        HPI: 25 y.o.  at 33w4d presents with complaint of continued intermittent moderate contractions with a recent episode lasting nearly 10 hours.  The patient has chronic hypertension without medications and is been stable.  While hospitalized for the last  labor the patient had a seizure episode.  She was evaluated by neurology and no additional recommendations were made.    Vitals:    21 1533   BP: 124/80   Weight: 103 kg (227 lb)       Review of systems:     Gen: negative  CV:     negative  GI: negative  :   good fetal movement noted , pelvic pressure and pelvic cramping  MS:    back pain   Neuro: negative and denies headaches and visual changes   Pul: negative      A/P  1. Intrauterine pregnancy at 33w4d   2. Pregnancy Risk:  HIGH RISK    Diagnoses and all orders for this visit:    1. 33 weeks gestation of pregnancy (Primary)    2. Screening for blood or protein in urine  -     POC Urinalysis Dipstick    3. Supervision of high risk pregnancy, antepartum    4. Chronic hypertension affecting pregnancy Chronic medical problem - stable     5. Generalized convulsive seizures (CMS/HCC) Chronic medical problem - worsening     6. Obesity in pregnancy, antepartum Chronic medical problem - stable     7.  labor in third trimester without delivery Chronic medical problem - stable           Pre-eclampsia symptoms were discussed and warnings were given   labor was discussed.  Warnings were provided.      -----------------------  PLAN:   Return in about 1 week (around 2/3/2021) for OB check and BPP.      Carlo Sanderson MD  2021 15:45 EST

## 2021-02-01 ENCOUNTER — TELEPHONE (OUTPATIENT)
Dept: OBSTETRICS AND GYNECOLOGY | Age: 26
End: 2021-02-01

## 2021-02-01 NOTE — TELEPHONE ENCOUNTER
Patient called and stated she went into Labor last night and delivered her baby at home on the couch at 1:38 this morning.  She called EMS and was transferred to [facility] which was the closest hospital.  Baby is in the NICU at this time.  She will call for postpartum visit once released from the hospital.

## 2021-02-26 ENCOUNTER — TELEPHONE (OUTPATIENT)
Dept: OBSTETRICS AND GYNECOLOGY | Age: 26
End: 2021-02-26

## 2021-11-08 ENCOUNTER — TELEPHONE (OUTPATIENT)
Dept: OBSTETRICS AND GYNECOLOGY | Age: 26
End: 2021-11-08

## 2021-11-08 NOTE — TELEPHONE ENCOUNTER
I have called for the records, I will follow up with that.   She is coming in tomorrow for lab appt, could you put the orders in?

## 2021-11-08 NOTE — TELEPHONE ENCOUNTER
Pt called, she is around 4w3d pregnant. Went to ER over the weekend d/t some bright red blood when wiping and some cramping. They did an US saw a sac also did blood work and the HCG quat was 33- ER advised pt to follow up this week with OB. She states that she is has taken several pregnancy test ,took one this morning and the HCG line is getting darker each  Time.    Would you want her to come in this week to see you, possible US and blood work? There is an US opening tomorrow and a couple openings with you on Wednesday. I did advise her that we usually do not do ultrasounds until around 8 weeks.

## 2021-11-08 NOTE — TELEPHONE ENCOUNTER
Please get records from her hospital visit. You can offer her a lab appointment for hcg level today and Wednesday. Advise return to ER for heavy bleeding and/or pain. Thank you.

## 2021-11-10 DIAGNOSIS — N92.6 MISSED MENSES: Primary | ICD-10-CM

## 2021-11-10 NOTE — TELEPHONE ENCOUNTER
Called [facility] spoke [place] for a second time, requested records to be faxed.

## 2021-11-12 LAB — HCG INTACT+B SERPL-ACNC: 136 MIU/ML

## 2021-11-15 DIAGNOSIS — N92.6 MISSED MENSES: Primary | ICD-10-CM

## 2021-11-16 LAB — HCG INTACT+B SERPL-ACNC: 908 MIU/ML

## 2021-11-17 ENCOUNTER — TELEPHONE (OUTPATIENT)
Dept: OBSTETRICS AND GYNECOLOGY | Age: 26
End: 2021-11-17

## 2021-11-17 NOTE — TELEPHONE ENCOUNTER
Reviewed hcg results with patient. She is feeling well without complaint. US and visit scheduled - pt aware. Will call with changes or concerns.

## 2021-12-01 ENCOUNTER — OFFICE VISIT (OUTPATIENT)
Dept: OBSTETRICS AND GYNECOLOGY | Age: 26
End: 2021-12-01

## 2021-12-01 VITALS
DIASTOLIC BLOOD PRESSURE: 86 MMHG | BODY MASS INDEX: 41.22 KG/M2 | WEIGHT: 224 LBS | HEIGHT: 62 IN | SYSTOLIC BLOOD PRESSURE: 124 MMHG

## 2021-12-01 DIAGNOSIS — Z14.8 CARRIER OF FRAGILE X SYNDROME: ICD-10-CM

## 2021-12-01 DIAGNOSIS — Z30.2 REQUEST FOR STERILIZATION: ICD-10-CM

## 2021-12-01 DIAGNOSIS — R56.9 GENERALIZED CONVULSIVE SEIZURES (HCC): ICD-10-CM

## 2021-12-01 DIAGNOSIS — O09.299 HX OF PREECLAMPSIA, PRIOR PREGNANCY, CURRENTLY PREGNANT: ICD-10-CM

## 2021-12-01 DIAGNOSIS — Z28.39 SUSCEPTIBLE TO VARICELLA (NON-IMMUNE), CURRENTLY PREGNANT: ICD-10-CM

## 2021-12-01 DIAGNOSIS — O09.899 SUSCEPTIBLE TO VARICELLA (NON-IMMUNE), CURRENTLY PREGNANT: ICD-10-CM

## 2021-12-01 DIAGNOSIS — Z13.89 SCREENING FOR BLOOD OR PROTEIN IN URINE: ICD-10-CM

## 2021-12-01 DIAGNOSIS — O09.90 SUPERVISION OF HIGH RISK PREGNANCY, ANTEPARTUM: ICD-10-CM

## 2021-12-01 DIAGNOSIS — Z3A.01 LESS THAN 8 WEEKS GESTATION OF PREGNANCY: Primary | ICD-10-CM

## 2021-12-01 DIAGNOSIS — O99.210 OBESITY IN PREGNANCY, ANTEPARTUM: ICD-10-CM

## 2021-12-01 DIAGNOSIS — O09.219 PREVIOUS PRETERM DELIVERY, ANTEPARTUM: ICD-10-CM

## 2021-12-01 DIAGNOSIS — O10.919 CHRONIC HYPERTENSION AFFECTING PREGNANCY: ICD-10-CM

## 2021-12-01 LAB
BILIRUB BLD-MCNC: NEGATIVE MG/DL
CLARITY, POC: CLEAR
COLOR UR: YELLOW
GLUCOSE UR STRIP-MCNC: NEGATIVE MG/DL
KETONES UR QL: ABNORMAL
LEUKOCYTE EST, POC: NEGATIVE
NITRITE UR-MCNC: NEGATIVE MG/ML
PH UR: 6.5 [PH] (ref 5–8)
PROT UR STRIP-MCNC: NEGATIVE MG/DL
RBC # UR STRIP: NEGATIVE /UL
SP GR UR: 1.02 (ref 1–1.03)
UROBILINOGEN UR QL: NORMAL

## 2021-12-01 PROCEDURE — 99214 OFFICE O/P EST MOD 30 MIN: CPT | Performed by: OBSTETRICS & GYNECOLOGY

## 2021-12-01 NOTE — PROGRESS NOTES
[facility]   Obstetrics and Gynecology     2021      Patient:  Rhina Marquez   MR#:[id number]    Office note    Chief Complaint   Patient presents with   • Follow-up     preg confirm LMP 10/8/21; pt refuses pap today        Subjective     History of Present Illness  26 y.o. female  at 7w1d presents for confirmation of viability with viable gestation measuring stated gestational age.  The patient has a second gestational sac with a yolk sac and no fetal pole.  The patient has chronic hypertension and is currently taking labetalol 200 3 times daily.    Other factors complicating the patient's pregnancy include history of chronic headaches, history of  delivery,          Relevant data reviewed:      Patient Active Problem List   Diagnosis   • Generalized convulsive seizures (HCC)   • Hx of preeclampsia, prior pregnancy, currently pregnant   • Chronic hypertension affecting pregnancy   • Chronic headaches   • Pituitary gland enlarged (HCC)   • Susceptible to varicella (non-immune), currently pregnant   • Obesity in pregnancy, antepartum   • Previous  delivery, antepartum   • Request for sterilization   • Carrier of fragile X syndrome   • Supervision of high risk pregnancy, antepartum   • Pregnancy   •  labor in third trimester without delivery       Past Medical History:   Diagnosis Date   • Anxiety    • Asthma    • Convulsive seizure (HCC)     Neurologic workup to date is inconclusive for seizure disorder, patient did not follow through with EEG, saw neurologist Dr. Willam Calderon   • Depression     hx when younger   • Eye infection    • Gestational hypertension    • Mild preeclampsia    • Pre-eclampsia in postpartum period 2018   • Preeclampsia 2015   • Rubella non-immune status, antepartum      Past Surgical History:   Procedure Laterality Date   • EAR TUBES       Obstetric History:  OB History        6    Para   3    Term   2       1     AB   1    Living   3       SAB   1    IAB   0    Ectopic   0    Molar   0    Multiple   0    Live Births   3          Obstetric Comments   7/22/2020 6w4d Dating US:  Estimated Date of Delivery: 3/13/21 based on US hb   10/13/20 - 18-3 weeks - Normal CL and normal completed anatomy - [facility]   12/23/2020 28w4d normal interval growth: EFW 72% AC 86%, Breech  hb   1/27/2021 33w4d normal inter hardik growth: EFW 56% AC 88%, BPP 8/8 hb               Menstrual History:     Patient's last menstrual period was 10/08/2021 (exact date).       # 1 - Date: None, Sex: None, Weight: None, GA: None, Delivery: None, Apgar1: None, Apgar5: None, Living: None, Birth Comments: None    # 2 - Date: 2014, Sex: None, Weight: None, GA: None, Delivery: None, Apgar1: None, Apgar5: None, Living: None, Birth Comments: None    # 3 - Date: 11/05/15, Sex: Female, Weight: 2906 g (6 lb 6.5 oz), GA: 37w0d, Delivery: Vaginal, Spontaneous, Apgar1: 8, Apgar5: 9, Living: Living, Birth Comments: Delivery note reviewed.  No complications HB    # 4 - Date: 12/17/16, Sex: Female, Weight: 3756 g (8 lb 4.5 oz), GA: 37w0d, Delivery: Vaginal, Spontaneous, Apgar1: 9, Apgar5: 9, Living: Living, Birth Comments: delivery note reviewed - no comps - [facility]     # 5 - Date: 09/07/18, Sex: Male, Weight: 3162 g (6 lb 15.5 oz), GA: 34w6d, Delivery: Vaginal, Spontaneous, Apgar1: 8, Apgar5: 9, Living: Living, Birth Comments: del note reviewed - no comps -[facility]      # 6 - Date: None, Sex: None, Weight: None, GA: None, Delivery: None, Apgar1: None, Apgar5: None, Living: None, Birth Comments: None    Family History   Problem Relation Age of Onset   • Hypertension Father    • Cancer Maternal Grandmother    • Stroke Maternal Grandfather    • Diabetes Maternal Grandfather    • Migraines Maternal Grandfather    • Dementia Maternal Grandfather    • Stroke Maternal Aunt    • Diabetes Maternal Uncle    • Migraines Mother    • Arthritis Mother    • No Known Problems Brother    • No Known  "Problems Son    • No Known Problems Daughter    • No Known Problems Daughter    • Breast cancer Neg Hx    • Ovarian cancer Neg Hx    • Uterine cancer Neg Hx    • Colon cancer Neg Hx      Social History     Tobacco Use   • Smoking status: Former Smoker   • Smokeless tobacco: Never Used   Vaping Use   • Vaping Use: Never used   Substance Use Topics   • Alcohol use: No   • Drug use: No     Patient has no known allergies.    Current Outpatient Medications:   •  labetalol (NORMODYNE) 200 MG tablet, Take 1 tablet by mouth 3 (Three) Times a Day., Disp: 90 tablet, Rfl: 5  •  PRENATAL GUMMY VITAMIN, Chew 1 each Daily., Disp: , Rfl:     The following portions of the patient's history were reviewed and updated as appropriate: allergies, current medications, past family history, past medical history, past social history, past surgical history and problem list.    Review of Systems   Constitutional: Negative.    Respiratory: Negative.    Cardiovascular: Negative.    Gastrointestinal: Negative.    Genitourinary: Negative.    Psychiatric/Behavioral: Negative.        BP Readings from Last 3 Encounters:   12/01/21 124/86   01/27/21 124/80   01/23/21 112/60      Wt Readings from Last 3 Encounters:   12/01/21 102 kg (224 lb)   01/27/21 103 kg (227 lb)   01/21/21 105 kg (230 lb 12.8 oz)      BMI: Estimated body mass index is 40.97 kg/m² as calculated from the following:    Height as of this encounter: 157.5 cm (62\").    Weight as of this encounter: 102 kg (224 lb). BSA: Estimated body surface area is 2.01 meters squared as calculated from the following:    Height as of this encounter: 157.5 cm (62\").    Weight as of this encounter: 102 kg (224 lb).    Objective   Physical Exam  Vitals and nursing note reviewed.   Constitutional:       Appearance: She is well-developed.   HENT:      Head: Normocephalic and atraumatic.   Cardiovascular:      Rate and Rhythm: Normal rate.   Pulmonary:      Effort: Pulmonary effort is normal.   Abdominal: "      General: Bowel sounds are normal. There is no distension.      Palpations: Abdomen is soft.      Tenderness: There is no abdominal tenderness.   Skin:     General: Skin is warm and dry.   Neurological:      Mental Status: She is alert and oriented to person, place, and time.   Psychiatric:         Behavior: Behavior normal.         Thought Content: Thought content normal.         Judgment: Judgment normal.         Assessment/Plan     Diagnoses and all orders for this visit:    1. Less than 8 weeks gestation of pregnancy (Primary)    2. Screening for blood or protein in urine  -     POC Urinalysis Dipstick    3. Request for sterilization    4. Susceptible to varicella (non-immune), currently pregnant    5. Previous  delivery, antepartum    6. Supervision of high risk pregnancy, antepartum  -     ABO / Rh  -     RPR  -     Hepatitis B Surface Antigen  -     Rubella Antibody, IgG  -     Hepatitis C Antibody  -     Antibody Screen  -     Urine Culture - Urine, Urine, Clean Catch  -     Varicella Zoster Antibody, IgG  -     CBC (No Diff)  -     Hemoglobin A1c  -     HIV-1 / O / 2 Ag / Antibody 4th Generation    7. Chronic hypertension affecting pregnancy    8. Carrier of fragile X syndrome    9. Generalized convulsive seizures (HCC)    10. Hx of preeclampsia, prior pregnancy, currently pregnant    11. Obesity in pregnancy, antepartum          No follow-ups on file.    Carlo Sanderson MD   2021 15:20 EST

## 2021-12-03 ENCOUNTER — TELEPHONE (OUTPATIENT)
Dept: OBSTETRICS AND GYNECOLOGY | Age: 26
End: 2021-12-03

## 2021-12-03 PROBLEM — Z92.29 HISTORY OF VARICELLA VACCINATION: Status: ACTIVE | Noted: 2021-12-03

## 2021-12-03 LAB
ABO GROUP BLD: NORMAL
BACTERIA UR CULT: NORMAL
BACTERIA UR CULT: NORMAL
BLD GP AB SCN SERPL QL: NEGATIVE
ERYTHROCYTE [DISTWIDTH] IN BLOOD BY AUTOMATED COUNT: 13.2 % (ref 11.7–15.4)
HBA1C MFR BLD: 5.1 % (ref 4.8–5.6)
HBV SURFACE AG SERPL QL IA: NEGATIVE
HCT VFR BLD AUTO: 41.2 % (ref 34–46.6)
HCV AB S/CO SERPL IA: <0.1 S/CO RATIO (ref 0–0.9)
HGB BLD-MCNC: 13.6 G/DL (ref 11.1–15.9)
HIV 1+2 AB+HIV1 P24 AG SERPL QL IA: NON REACTIVE
MCH RBC QN AUTO: 29.6 PG (ref 26.6–33)
MCHC RBC AUTO-ENTMCNC: 33 G/DL (ref 31.5–35.7)
MCV RBC AUTO: 90 FL (ref 79–97)
PLATELET # BLD AUTO: 346 X10E3/UL (ref 150–450)
RBC # BLD AUTO: 4.6 X10E6/UL (ref 3.77–5.28)
RH BLD: POSITIVE
RPR SER QL: NON REACTIVE
RUBV IGG SERPL IA-ACNC: 2.12 INDEX
VZV IGG SER IA-ACNC: <135 INDEX
WBC # BLD AUTO: 9.4 X10E3/UL (ref 3.4–10.8)

## 2021-12-03 NOTE — TELEPHONE ENCOUNTER
"Patient called inquiring about her urine culture results.  She states they came over her MyChart but she doesn't understand them.  She is wanting to know if she has a UTI and if the numbers are \"good\"  Please advise.  "

## 2021-12-03 NOTE — PROGRESS NOTES
Normal prenatal lab panel reviewed.    Varicella non-immune but records show receiving the vaccine.

## 2021-12-06 NOTE — TELEPHONE ENCOUNTER
Spoke with patient and informed her she does not have an UTI and that her urine is normal.  Patient voices understanding.

## 2021-12-06 NOTE — TELEPHONE ENCOUNTER
Attempted to call patient with hcg results. Voicemail left to return call.   SUBJECTIVE:  Bea Huber is a 73year old male who presents to the clinic for evaluation of cerumen impaction. OBJECTIVE:  --Ear exam was done under the microscope. Patient was placed in a supine position, and a microscope was then brought into the field. A speculum was placed in the right ear. Cerumen in the ear canal partially occluding the canal and unable to completely visualized ear canal and TM. Using an alligator forcep, and curette, the cerumen was removed. In a similar fashion, a speculum was inserted into the left ear canal.  Cerumen in the ear canal partially occluding the canal and unable to completely visualized ear canal and TM. The cerumen was removed using an alligator forcep, and curette. Ears:    Right ear: EAC patent, tympanic membrane intact, no middle ear effusion, no otorrhea  Left ear: EAC patent, tympanic membrane intact, no middle ear effusion, no otorrhea    ASSESSMENT:  ED Diagnosis   1. Bilateral impacted cerumen  REMOVE IMPACTED CERUMEN REQ INSTRUMENTATION UNILATERAL         PLAN:  -  Orders Placed This Encounter   â¢ REMOVE IMPACTED CERUMEN REQ INSTRUMENTATION UNILATERAL      -Return in about 6 months (around 6/6/2022), or clean ears.      Electronically signed by: Timur Dominguez PA-C, 12/06/21    Supervising Physician: :Nuvia Bennett MD

## 2021-12-21 ENCOUNTER — APPOINTMENT (OUTPATIENT)
Dept: ULTRASOUND IMAGING | Facility: HOSPITAL | Age: 26
End: 2021-12-21

## 2021-12-21 ENCOUNTER — HOSPITAL ENCOUNTER (EMERGENCY)
Facility: HOSPITAL | Age: 26
Discharge: HOME | End: 2021-12-22
Attending: EMERGENCY MEDICINE | Admitting: EMERGENCY MEDICINE

## 2021-12-21 DIAGNOSIS — O20.0 THREATENED MISCARRIAGE: Primary | ICD-10-CM

## 2021-12-21 LAB
ABO GROUP BLD: NORMAL
BASOPHILS # BLD AUTO: 0.06 10*3/MM3 (ref 0–0.2)
BASOPHILS NFR BLD AUTO: 0.5 % (ref 0–1.5)
BILIRUB UR QL STRIP: NEGATIVE
BLD GP AB SCN SERPL QL: NEGATIVE
CLARITY UR: CLEAR
CLUE CELLS SPEC QL WET PREP: ABNORMAL
COLOR UR: YELLOW
DEPRECATED RDW RBC AUTO: 42 FL (ref 37–54)
EOSINOPHIL # BLD AUTO: 0.22 10*3/MM3 (ref 0–0.4)
EOSINOPHIL NFR BLD AUTO: 1.8 % (ref 0.3–6.2)
ERYTHROCYTE [DISTWIDTH] IN BLOOD BY AUTOMATED COUNT: 13 % (ref 12.3–15.4)
GLUCOSE UR STRIP-MCNC: NEGATIVE MG/DL
HCG INTACT+B SERPL-ACNC: NORMAL MIU/ML
HCT VFR BLD AUTO: 41.7 % (ref 34–46.6)
HGB BLD-MCNC: 14.1 G/DL (ref 12–15.9)
HGB UR QL STRIP.AUTO: NEGATIVE
HOLD SPECIMEN: NORMAL
HOLD SPECIMEN: NORMAL
HYDATID CYST SPEC WET PREP: ABNORMAL
IMM GRANULOCYTES # BLD AUTO: 0.08 10*3/MM3 (ref 0–0.05)
IMM GRANULOCYTES NFR BLD AUTO: 0.6 % (ref 0–0.5)
KETONES UR QL STRIP: ABNORMAL
LEUKOCYTE ESTERASE UR QL STRIP.AUTO: NEGATIVE
LYMPHOCYTES # BLD AUTO: 3.04 10*3/MM3 (ref 0.7–3.1)
LYMPHOCYTES NFR BLD AUTO: 24.3 % (ref 19.6–45.3)
MCH RBC QN AUTO: 29.9 PG (ref 26.6–33)
MCHC RBC AUTO-ENTMCNC: 33.8 G/DL (ref 31.5–35.7)
MCV RBC AUTO: 88.3 FL (ref 79–97)
MONOCYTES # BLD AUTO: 0.71 10*3/MM3 (ref 0.1–0.9)
MONOCYTES NFR BLD AUTO: 5.7 % (ref 5–12)
NEUTROPHILS NFR BLD AUTO: 67.1 % (ref 42.7–76)
NEUTROPHILS NFR BLD AUTO: 8.38 10*3/MM3 (ref 1.7–7)
NITRITE UR QL STRIP: NEGATIVE
NRBC BLD AUTO-RTO: 0 /100 WBC (ref 0–0.2)
PH UR STRIP.AUTO: 5.5 [PH] (ref 5–8)
PLATELET # BLD AUTO: 347 10*3/MM3 (ref 140–450)
PMV BLD AUTO: 10.4 FL (ref 6–12)
PROT UR QL STRIP: ABNORMAL
RBC # BLD AUTO: 4.72 10*6/MM3 (ref 3.77–5.28)
RH BLD: POSITIVE
SP GR UR STRIP: >=1.03 (ref 1–1.03)
T VAGINALIS SPEC QL WET PREP: ABNORMAL
T&S EXPIRATION DATE: NORMAL
UROBILINOGEN UR QL STRIP: ABNORMAL
WBC NRBC COR # BLD: 12.49 10*3/MM3 (ref 3.4–10.8)
WBC SPEC QL WET PREP: ABNORMAL
WHOLE BLOOD HOLD SPECIMEN: NORMAL
WHOLE BLOOD HOLD SPECIMEN: NORMAL
YEAST GENITAL QL WET PREP: ABNORMAL

## 2021-12-21 PROCEDURE — 93976 VASCULAR STUDY: CPT

## 2021-12-21 PROCEDURE — 85025 COMPLETE CBC W/AUTO DIFF WBC: CPT | Performed by: EMERGENCY MEDICINE

## 2021-12-21 PROCEDURE — 86850 RBC ANTIBODY SCREEN: CPT | Performed by: EMERGENCY MEDICINE

## 2021-12-21 PROCEDURE — 84702 CHORIONIC GONADOTROPIN TEST: CPT | Performed by: EMERGENCY MEDICINE

## 2021-12-21 PROCEDURE — 87491 CHLMYD TRACH DNA AMP PROBE: CPT | Performed by: EMERGENCY MEDICINE

## 2021-12-21 PROCEDURE — 86901 BLOOD TYPING SEROLOGIC RH(D): CPT | Performed by: EMERGENCY MEDICINE

## 2021-12-21 PROCEDURE — 76815 OB US LIMITED FETUS(S): CPT

## 2021-12-21 PROCEDURE — 81003 URINALYSIS AUTO W/O SCOPE: CPT | Performed by: EMERGENCY MEDICINE

## 2021-12-21 PROCEDURE — 36415 COLL VENOUS BLD VENIPUNCTURE: CPT

## 2021-12-21 PROCEDURE — 86900 BLOOD TYPING SEROLOGIC ABO: CPT | Performed by: EMERGENCY MEDICINE

## 2021-12-21 PROCEDURE — 87210 SMEAR WET MOUNT SALINE/INK: CPT | Performed by: EMERGENCY MEDICINE

## 2021-12-21 PROCEDURE — 87591 N.GONORRHOEAE DNA AMP PROB: CPT | Performed by: EMERGENCY MEDICINE

## 2021-12-21 PROCEDURE — 99284 EMERGENCY DEPT VISIT MOD MDM: CPT

## 2021-12-21 PROCEDURE — P9612 CATHETERIZE FOR URINE SPEC: HCPCS

## 2021-12-21 PROCEDURE — 76817 TRANSVAGINAL US OBSTETRIC: CPT

## 2021-12-21 RX ORDER — LABETALOL 200 MG/1
200 TABLET, FILM COATED ORAL ONCE
Status: COMPLETED | OUTPATIENT
Start: 2021-12-21 | End: 2021-12-21

## 2021-12-21 RX ORDER — SODIUM CHLORIDE 0.9 % (FLUSH) 0.9 %
10 SYRINGE (ML) INJECTION AS NEEDED
Status: DISCONTINUED | OUTPATIENT
Start: 2021-12-21 | End: 2021-12-22 | Stop reason: HOSPADM

## 2021-12-21 RX ADMIN — LABETALOL HYDROCHLORIDE 200 MG: 200 TABLET, FILM COATED ORAL at 23:58

## 2021-12-22 VITALS
HEART RATE: 85 BPM | TEMPERATURE: 96.8 F | HEIGHT: 62 IN | WEIGHT: 230 LBS | SYSTOLIC BLOOD PRESSURE: 141 MMHG | DIASTOLIC BLOOD PRESSURE: 93 MMHG | RESPIRATION RATE: 18 BRPM | BODY MASS INDEX: 42.33 KG/M2 | OXYGEN SATURATION: 99 %

## 2021-12-22 NOTE — ED PROVIDER NOTES
EMERGENCY DEPARTMENT ENCOUNTER    Chief Complaint: Vaginal bleeding, pregnant  History given by: Patient  History limited by: Nothing  Room Number: 22/22  PMD: Aneesh Coto MD  Obstetrician: Dr. Carlo Sanderson    HPI:  Pt is a 26 y.o. female with reported history of G6, P4 Ab1 at 10-0/7 weeks (7/19/2022 EDC) by a  7-week ultrasound presents complaining of vaginal bleeding the equivalent of 1 pad since 3:45 PM today with minimal lower abdominal cramping she rates as a 1 out of 10.  Patient reports mild nausea ongoing, no vomiting in the past 24 hours, denies chest pain, headache, unilateral weakness or numbness, upper abdominal discomfort, cough, fever, changes in urinary or bowel habits, vaginal discharge, suspected exposure to STD, swelling of extremities.  Patient reports she has a history of hypertension, on labetalol 200 mg twice daily, reports that she took her last dose at 9 AM today    Duration: 5 or 6 hours  Associated Symptoms: Minimal lower abdominal cramping, less than menstrual cramps  Aggravating Factors: Unknown  Alleviating Factors: Nothing  Treatment before arrival: Nothing    Upon review the patient's chart it is noted:  On family medicine visit 11/4/2021 it is noted patient on labetalol 200 mg twice daily.  On review of notes available with been electronic medical record system, it is noted on 12/1/2021, a pelvic ultrasound noted h 2 gestational sacs at 7 weeks 1 day A-yolk sac only, no fetal pole,   B yolk sac, fetal pole noted with normal fetal heart tones, resolving subchorionic hemorrhage    PAST MEDICAL HISTORY  Active Ambulatory Problems     Diagnosis Date Noted   • Generalized convulsive seizures (HCC) 08/29/2016   • Hx of preeclampsia, prior pregnancy, currently pregnant 03/05/2018   • Chronic hypertension affecting pregnancy 06/06/2018   • Chronic headaches 06/06/2018   • Pituitary gland enlarged (HCC) 09/14/2018   • Susceptible to varicella (non-immune), currently pregnant  2020   • Obesity in pregnancy, antepartum 2020   • Previous  delivery, antepartum 2020   • Request for sterilization 2020   • Carrier of fragile X syndrome 2020   • Supervision of high risk pregnancy, antepartum 2020   • Pregnancy 2021   •  labor in third trimester without delivery 2021   • History of varicella vaccination 2021     Resolved Ambulatory Problems     Diagnosis Date Noted   • High risk pregnancy in young multigravida, antepartum 2016   • Maternal history of obstetrical complications 2016   • Maternal varicella, non-immune 2016   • Rubella non-immune status, antepartum 2016   • UTI in pregnancy 2016   • History of pre-eclampsia in prior pregnancy, currently pregnant in third trimester 2016   • Evaluate anatomy not seen on prior sonogram 2016   • Palpitations 2016   • Pregnancy 10/20/2016   • Large for gestational age (LGA) 2016   • Hypertension affecting pregnancy in third trimester 2016   • Proteinuria affecting pregnancy in third trimester 2016   • Antepartum mild preeclampsia 2016   • GBS (group B Streptococcus carrier), +RV culture, currently pregnant 2016   • Preeclampsia 2016   •  (spontaneous vaginal delivery) 2016   • Headache in pregnancy, antepartum, second trimester 2018   • Obesity affecting pregnancy in second trimester 2018   • Evaluate anatomy not seen on prior sonogram 2018   • Sciatica, left side 2018   • Pregnancy 2018   • High-risk pregnancy supervision 2018   • Anemia affecting pregnancy 2018   • Excessive fetal growth affecting management of pregnancy in third trimester 2018   • Pregnant 2018   • Elevated blood pressure complicating pregnancy, antepartum, third trimester 2018   •  labor in third trimester without delivery 2018   • Pre-eclampsia in  postpartum period 09/14/2018   • Nausea and vomiting during pregnancy 09/15/2020     Past Medical History:   Diagnosis Date   • Anxiety    • Asthma    • Convulsive seizure (HCC) 2015   • Depression    • Eye infection 2000   • Gestational hypertension    • Mild preeclampsia 2016       PAST SURGICAL HISTORY  Past Surgical History:   Procedure Laterality Date   • EAR TUBES         FAMILY HISTORY  Family History   Problem Relation Age of Onset   • Hypertension Father    • Cancer Maternal Grandmother    • Stroke Maternal Grandfather    • Diabetes Maternal Grandfather    • Migraines Maternal Grandfather    • Dementia Maternal Grandfather    • Stroke Maternal Aunt    • Diabetes Maternal Uncle    • Migraines Mother    • Arthritis Mother    • No Known Problems Brother    • No Known Problems Son    • No Known Problems Daughter    • No Known Problems Daughter    • Breast cancer Neg Hx    • Ovarian cancer Neg Hx    • Uterine cancer Neg Hx    • Colon cancer Neg Hx        SOCIAL HISTORY  Social History     Socioeconomic History   • Marital status:      Spouse name: OLEGARIO   • Number of children: 2   • Years of education: 12   Tobacco Use   • Smoking status: Former Smoker   • Smokeless tobacco: Never Used   Vaping Use   • Vaping Use: Never used   Substance and Sexual Activity   • Alcohol use: No   • Drug use: No   • Sexual activity: Yes     Partners: Male     Birth control/protection: None     Comment: sigh other = OLEGARIO       ALLERGIES  Patient has no known allergies.    REVIEW OF SYSTEMS  Review of Systems   Constitutional: Negative for chills and fever.   HENT: Negative for rhinorrhea and sore throat.    Eyes: Negative for visual disturbance.   Respiratory: Negative for cough and shortness of breath.    Cardiovascular: Negative for chest pain, palpitations and leg swelling.   Gastrointestinal: Negative for abdominal pain, diarrhea and vomiting.   Endocrine: Negative.    Genitourinary: Positive for pelvic pain ( Minimal  suprapubic cramping, less than menstrual cycle) and vaginal bleeding. Negative for decreased urine volume, dysuria and frequency.   Musculoskeletal: Negative for neck pain.   Skin: Negative for rash.   Neurological: Negative for syncope and headaches.   Psychiatric/Behavioral: Negative.    All other systems reviewed and are negative.      PHYSICAL EXAM  ED Triage Vitals [12/21/21 1938]   Temp Heart Rate Resp BP SpO2   96.8 °F (36 °C) 86 18 134/86 100 %      Temp src Heart Rate Source Patient Position BP Location FiO2 (%)   -- -- -- -- --       Physical Exam  Vitals and nursing note reviewed.   Constitutional:       General: She is in acute distress (mild).      Appearance: She is not toxic-appearing.   HENT:      Head: Normocephalic and atraumatic.   Cardiovascular:      Rate and Rhythm: Normal rate and regular rhythm.      Pulses:           Posterior tibial pulses are 2+ on the right side and 2+ on the left side.      Heart sounds: Normal heart sounds. No murmur heard.      Pulmonary:      Effort: Pulmonary effort is normal. No respiratory distress.      Breath sounds: Normal breath sounds.   Abdominal:      General: Bowel sounds are normal.      Palpations: Abdomen is soft.      Tenderness: There is no abdominal tenderness. There is no guarding or rebound.      Comments: Obese, unable to appreciate fundus   Musculoskeletal:         General: Normal range of motion.      Cervical back: Normal range of motion and neck supple.      Right lower leg: No edema.      Left lower leg: No edema.   Skin:     General: Skin is warm and dry.   Neurological:      Mental Status: She is alert and oriented to person, place, and time.   Psychiatric:         Mood and Affect: Mood and affect normal.         LAB RESULTS  Lab Results (last 24 hours)     Procedure Component Value Units Date/Time    CBC & Differential [[id number]]  (Abnormal) Collected: 12/21/21 2125    Specimen: Blood Updated: 12/21/21 2141    Narrative:      The following  orders were created for panel order CBC & Differential.  Procedure                               Abnormality         Status                     ---------                               -----------         ------                     CBC Auto Differential[[id number]]        Abnormal            Final result                 Please view results for these tests on the individual orders.    hCG, Quantitative, Pregnancy [[id number]] Collected: 12/21/21 2125    Specimen: Blood Updated: 12/21/21 2212     HCG Quantitative 92,231.00 mIU/mL     Narrative:      HCG Ranges by Gestational Age    Females - non-pregnant premenopausal   </= 1mIU/mL HCG  Females - postmenopausal               </= 7mIU/mL HCG    3 Weeks       5.4   -      72 mIU/mL  4 Weeks      10.2   -     708 mIU/mL  5 Weeks       217   -   8,245 mIU/mL  6 Weeks       152   -  32,177 mIU/mL  7 Weeks     4,059   - 153,767 mIU/mL  8 Weeks    31,366   - 149,094 mIU/mL  9 Weeks    59,109   - 135,901 mIU/mL  10 Weeks   44,186   - 170,409 mIU/mL  12 Weeks   27,107   - 201,615 mIU/mL  14 Weeks   24,302   -  93,646 mIU/mL  15 Weeks   12,540   -  69,747 mIU/mL  16 Weeks    8,904   -  55,332 mIU/mL  17 Weeks    8,240   -  51,793 mIU/mL  18 Weeks    9,649   -  55,271 mIU/mL    Results may be falsely decreased if patient taking Biotin.      CBC Auto Differential [[id number]]  (Abnormal) Collected: 12/21/21 2125    Specimen: Blood Updated: 12/21/21 2141     WBC 12.49 10*3/mm3      RBC 4.72 10*6/mm3      Hemoglobin 14.1 g/dL      Hematocrit 41.7 %      MCV 88.3 fL      MCH 29.9 pg      MCHC 33.8 g/dL      RDW 13.0 %      RDW-SD 42.0 fl      MPV 10.4 fL      Platelets 347 10*3/mm3      Neutrophil % 67.1 %      Lymphocyte % 24.3 %      Monocyte % 5.7 %      Eosinophil % 1.8 %      Basophil % 0.5 %      Immature Grans % 0.6 %      Neutrophils, Absolute 8.38 10*3/mm3      Lymphocytes, Absolute 3.04 10*3/mm3      Monocytes, Absolute 0.71 10*3/mm3      Eosinophils, Absolute 0.22 10*3/mm3       Basophils, Absolute 0.06 10*3/mm3      Immature Grans, Absolute 0.08 10*3/mm3      nRBC 0.0 /100 WBC     Urinalysis With Microscopic If Indicated (No Culture) - Urine, Catheter [[id number]]  (Abnormal) Collected: 12/21/21 2256    Specimen: Urine, Catheter Updated: 12/21/21 2352     Color, UA Yellow     Appearance, UA Clear     pH, UA 5.5     Specific Gravity, UA >=1.030     Glucose, UA Negative     Ketones, UA 40 mg/dL (2+)     Bilirubin, UA Negative     Blood, UA Negative     Protein, UA Trace     Leuk Esterase, UA Negative     Nitrite, UA Negative     Urobilinogen, UA 1.0 E.U./dL    Narrative:      Urine microscopic not indicated.    Wet Prep, Genital - Swab, Vagina [[id number]]  (Abnormal) Collected: 12/21/21 2256    Specimen: Swab from Vagina Updated: 12/21/21 2322     YEAST No yeast seen     HYPHAL ELEMENTS No Hyphal elements seen     WBC'S 1+ WBC's seen     Clue Cells, Wet Prep No Clue cells seen     Trichomonas, Wet Prep No Trichomonas seen    Chlamydia trachomatis, Neisseria gonorrhoeae, PCR - Swab, Cervix [[id number]] Collected: 12/21/21 2257    Specimen: Swab from Cervix Updated: 12/21/21 2306          I ordered the above labs and reviewed the results    RADIOLOGY  US Testicular or Ovarian Vascular Limited   Final Result         Electronically signed by Casimiro Whitehead MD on 12-22-21 at 0002      US Ob Transvaginal   Final Result         Electronically signed by Casimiro Whitehead MD on 12-22-21 at 0000      US Ob Limited 1 + Fetuses   Final Result         Electronically signed by Casimiro Whitehead MD on 12-21-21 at 2358      baby B is again noted corresponding to a gestational   age of 10 weeks 2 days with a fetal heart rate of 144 bpm.  2.  Previously noted baby A is no longer visualized.  There is a complex   5.7 x 1.7 x 2.6 cm cystic structure adjacent to baby B which may   represent a blighted ovum.  An area of subchorionic bleed however cannot   be entirely excluded     I ordered the above noted  radiological studies. Interpreted by radiologist. Viewed by me in PACS.       PROCEDURES  Procedures      PROGRESS AND CONSULTS       Discussed with patient pelvic rest precautions, and IUP viable with second IUP no longer visualized, need to follow closely with Dr. Carlo Sanderson for recheck, further testing, treatment as needed, blood pressure recheck, pelvic culture test and return to the ER with increasing pain, lightheadedness, passing out, bleeding great than 1 pad per hour or other concerns.    MEDICAL DECISION MAKING  Results were reviewed/discussed with the patient and they were also made aware of online access. Pt also made aware that some labs, such as cultures, will not be resulted during ER visit and followup with PMD is necessary.       MDM       DIAGNOSIS  Final diagnoses:   Threatened miscarriage       DISPOSITION  DISCHARGE    Patient discharged in stable condition.    Reviewed implications of results, diagnosis, meds, responsibility to follow up, warning signs and symptoms of possible worsening, potential complications and reasons to return to ER    Patient/Family voiced understanding of above instructions.    Discussed plan for discharge, as there is no emergent indication for admission. Patient referred to primary care provider for BP management due to today's BP. Pt/family is agreeable and understands need for follow up and repeat testing.  Pt is aware that discharge does not mean that nothing is wrong but it indicates no emergency is present that requires admission and they must continue care with follow-up as given below or physician of their choice.     FOLLOW-UP  Carlo Sanderson MD  [address] [facility] [address] [phone]    Schedule an appointment as soon as possible for a visit in 3 days  EVEN IF WELL         Medication List      Changed    labetalol 200 MG tablet  Commonly known as: NORMODYNE  Take 1 tablet by mouth 3 (Three) Times a Day.  What changed: when to take this               Latest Documented Vital Signs:  As of 04:23 EST  BP- 141/93 HR- 85 Temp- 96.8 °F (36 °C) O2 sat- 99%    --  Patient was wearing facemask when I entered the room and throughout our encounter. Full protective equipment was worn throughout this patient encounter including a face mask, eye protection and gloves. Hand hygiene was performed before donning protective equipment and after removal when leaving the room.      Yissel Lowe MD  12/22/21 0426

## 2021-12-22 NOTE — DISCHARGE INSTRUCTIONS
You are advised to follow closely with Dr. Carlo Sanderson in 2-3 days for recheck, pelvic culture results, final results of lab work and imaging testing, and further testing/treatment as needed.    Please return to the emergency department immediately with chest pain different than usual for you, shortness of air, abdominal pain, persistent vomiting/fever, blood in emesis or stool, lightheadedness/fainting, problems with speech, one sided weakness/numbness, new incontinence, problems with vision, bleeding greater than one pad per hour, or for worsening of symptoms or other concerns.

## 2021-12-22 NOTE — ED NOTES
Pt is 10 weeks pregnant. Started bleeding around 3:45 pm more bleeding than spotting but not saturating 1 pad a hour. Ob advised her to come in.     Karl Enrique RN  12/21/21 1943

## 2021-12-23 ENCOUNTER — TELEPHONE (OUTPATIENT)
Dept: OBSTETRICS AND GYNECOLOGY | Age: 26
End: 2021-12-23

## 2021-12-23 NOTE — TELEPHONE ENCOUNTER
Patient is scheduled to see Dr. Sanderson on Wednesday, December 29th for New OB.  Patient called stating she went to [facility] Tuesday night for vaginal bleeding and passing small dark blood clots.  She states she had a vanishing twin at 7 weeks.  The doctor at the ER told her it is either a blighted ovum or subchorionic bleed.  Patient states she is still bleeding.  Please advise.

## 2021-12-23 NOTE — TELEPHONE ENCOUNTER
I can see the notes in her chart and agree with the findings. I would suggest she keep her f/u appt as scheduled on Wednesday, or, can see if there is an earlier opening with u/s on that Monday for earlier evaluation and reassurance. In regards to bleeding, if bleeding is >1pph, she should return to ER, preferably a [facility] location as before. Pelvic rest in meantime. Thanks

## 2021-12-24 LAB
C TRACH RRNA SPEC QL NAA+PROBE: NEGATIVE
N GONORRHOEA RRNA SPEC QL NAA+PROBE: NEGATIVE

## 2021-12-29 ENCOUNTER — INITIAL PRENATAL (OUTPATIENT)
Dept: OBSTETRICS AND GYNECOLOGY | Age: 26
End: 2021-12-29

## 2021-12-29 VITALS — WEIGHT: 231 LBS | BODY MASS INDEX: 42.25 KG/M2 | DIASTOLIC BLOOD PRESSURE: 82 MMHG | SYSTOLIC BLOOD PRESSURE: 120 MMHG

## 2021-12-29 DIAGNOSIS — G44.029 CHRONIC CLUSTER HEADACHE, NOT INTRACTABLE: ICD-10-CM

## 2021-12-29 DIAGNOSIS — O31.10X0 VANISHING TWIN SYNDROME: ICD-10-CM

## 2021-12-29 DIAGNOSIS — Z13.79 ENCOUNTER FOR GENETIC SCREENING FOR DOWN SYNDROME: ICD-10-CM

## 2021-12-29 DIAGNOSIS — O99.210 OBESITY IN PREGNANCY, ANTEPARTUM: ICD-10-CM

## 2021-12-29 DIAGNOSIS — O10.919 CHRONIC HYPERTENSION AFFECTING PREGNANCY: ICD-10-CM

## 2021-12-29 DIAGNOSIS — Z13.89 SCREENING FOR BLOOD OR PROTEIN IN URINE: ICD-10-CM

## 2021-12-29 DIAGNOSIS — O09.90 SUPERVISION OF HIGH RISK PREGNANCY, ANTEPARTUM: ICD-10-CM

## 2021-12-29 DIAGNOSIS — R56.9 GENERALIZED CONVULSIVE SEIZURES (HCC): ICD-10-CM

## 2021-12-29 DIAGNOSIS — O09.219 PREVIOUS PRETERM DELIVERY, ANTEPARTUM: ICD-10-CM

## 2021-12-29 DIAGNOSIS — Z3A.11 11 WEEKS GESTATION OF PREGNANCY: Primary | ICD-10-CM

## 2021-12-29 PROBLEM — O60.03 PRETERM LABOR IN THIRD TRIMESTER WITHOUT DELIVERY: Status: RESOLVED | Noted: 2021-01-23 | Resolved: 2021-12-29

## 2021-12-29 LAB
BILIRUB BLD-MCNC: NEGATIVE MG/DL
CLARITY, POC: CLEAR
COLOR UR: YELLOW
EXTERNAL CYSTIC FIBROSIS: NEGATIVE
EXTERNAL NIPT: NORMAL
GLUCOSE UR STRIP-MCNC: NEGATIVE MG/DL
KETONES UR QL: NEGATIVE
LEUKOCYTE EST, POC: NEGATIVE
NITRITE UR-MCNC: NEGATIVE MG/ML
PH UR: 7.5 [PH] (ref 5–8)
PROT UR STRIP-MCNC: NEGATIVE MG/DL
RBC # UR STRIP: NEGATIVE /UL
SP GR UR: 1.02 (ref 1–1.03)
UROBILINOGEN UR QL: NORMAL

## 2021-12-29 PROCEDURE — 99214 OFFICE O/P EST MOD 30 MIN: CPT | Performed by: OBSTETRICS & GYNECOLOGY

## 2021-12-29 NOTE — PROGRESS NOTES
Chief Complaint   Patient presents with   • Routine Prenatal Visit     OB intake; ER f/u vaginal bleeding/passing small dark clots        HPI: 26 y.o.  at 11w1d presents for follow-up visit after being seen in the ER for vaginal bleeding and passing clots.  The patient has a vanishing twin.  With a normal healthy 11-week gestation.  The patient's past medical history is otherwise complicated by chronic hypertension that appears under good control with labetalol.  The patient also has a history of chronic headaches that seem fairly stable of late.        Vitals:    21 1134   BP: 120/82   Weight: 105 kg (231 lb)     Total weight gain for pregnancy:  0.454 kg (1 lb)    Review of systems:     Gen: negative  CV:     negative  GI: negative  :   Vaginal bleeding with cramping  MS:    negative  Neuro: headache  Pul: negative      A/P  1. Intrauterine pregnancy at 11w1d   2. Pregnancy Risk:  COMPLICATED        Diagnoses and all orders for this visit:    1. 11 weeks gestation of pregnancy (Primary)    2. Screening for blood or protein in urine  -     POC Urinalysis Dipstick    3. Supervision of high risk pregnancy, antepartum    4. Chronic hypertension affecting pregnancy    5. Chronic cluster headache, not intractable    6. Generalized convulsive seizures (HCC)    7. Obesity in pregnancy, antepartum    8. Previous  delivery, antepartum    9. Vanishing twin syndrome    10. Encounter for genetic screening for Down Syndrome        Nutrition and weight gain were addressed.      -----------------------  PLAN:   Return in about 4 weeks (around 2022) for ob check.      Carlo Sanderson MD  2021 12:07 EST

## 2022-01-10 ENCOUNTER — TELEPHONE (OUTPATIENT)
Dept: OBSTETRICS AND GYNECOLOGY | Age: 27
End: 2022-01-10

## 2022-01-10 NOTE — TELEPHONE ENCOUNTER
Patient states she was seen about 6 weeks ago and diagnosed with a vanishing twin at 7 weeks.  Started bleeding at 10 weeks.  She then saw Dr. Sanderson about two weeks ago. She states she is still bleeding wants to know if this is normal?  She states it is not heavy, sometimes it is bleeding sometimes its just spotting (it is all dark brown blood).  Veniat Shaikh has been verified with patient.

## 2022-01-10 NOTE — TELEPHONE ENCOUNTER
Looks like u/s revealed possible subchorionic hemorrhage which could be contributing to her bleeding. If it is persistent or worsening, could offer problem visit this week with rpt u/s.

## 2022-01-12 PROBLEM — Z13.79 GENETIC SCREENING: Status: ACTIVE | Noted: 2022-01-12

## 2022-01-12 PROBLEM — Z15.89: Status: ACTIVE | Noted: 2022-01-12

## 2022-01-24 ENCOUNTER — ROUTINE PRENATAL (OUTPATIENT)
Dept: OBSTETRICS AND GYNECOLOGY | Age: 27
End: 2022-01-24

## 2022-01-24 VITALS — SYSTOLIC BLOOD PRESSURE: 124 MMHG | DIASTOLIC BLOOD PRESSURE: 78 MMHG | WEIGHT: 231 LBS | BODY MASS INDEX: 42.25 KG/M2

## 2022-01-24 DIAGNOSIS — Z15.89 MUTATION OF FMR1 GENE WITH GREATER THAN 55 CGG TRINUCLEOTIDE REPEATS: ICD-10-CM

## 2022-01-24 DIAGNOSIS — R56.9 GENERALIZED CONVULSIVE SEIZURES: ICD-10-CM

## 2022-01-24 DIAGNOSIS — O10.919 CHRONIC HYPERTENSION AFFECTING PREGNANCY: ICD-10-CM

## 2022-01-24 DIAGNOSIS — Z13.89 SCREENING FOR BLOOD OR PROTEIN IN URINE: ICD-10-CM

## 2022-01-24 DIAGNOSIS — O09.90 SUPERVISION OF HIGH RISK PREGNANCY, ANTEPARTUM: ICD-10-CM

## 2022-01-24 DIAGNOSIS — O31.10X0 VANISHING TWIN SYNDROME: ICD-10-CM

## 2022-01-24 DIAGNOSIS — O99.210 OBESITY IN PREGNANCY, ANTEPARTUM: ICD-10-CM

## 2022-01-24 DIAGNOSIS — Z3A.14 14 WEEKS GESTATION OF PREGNANCY: Primary | ICD-10-CM

## 2022-01-24 LAB
BILIRUB BLD-MCNC: ABNORMAL MG/DL
CLARITY, POC: CLEAR
COLOR UR: YELLOW
GLUCOSE UR STRIP-MCNC: NEGATIVE MG/DL
KETONES UR QL: ABNORMAL
LEUKOCYTE EST, POC: NEGATIVE
NITRITE UR-MCNC: NEGATIVE MG/ML
PH UR: 6 [PH] (ref 5–8)
PROT UR STRIP-MCNC: ABNORMAL MG/DL
RBC # UR STRIP: NEGATIVE /UL
SP GR UR: 1.03 (ref 1–1.03)
UROBILINOGEN UR QL: NORMAL

## 2022-01-24 PROCEDURE — 99214 OFFICE O/P EST MOD 30 MIN: CPT | Performed by: OBSTETRICS & GYNECOLOGY

## 2022-01-24 NOTE — PROGRESS NOTES
Chief Complaint   Patient presents with   • Routine Prenatal Visit     no cc        HPI: 26 y.o.  at 14w6d presents without complaint with the pregnancy complicated by chronic hypertension that appears stable on labetalol.  The patient also has a vanishing twin and had vaginal bleeding in the first trimester that is resolved.    Vitals:    22 1142   BP: 124/78   Weight: 105 kg (231 lb)     Total weight gain for pregnancy:  0.454 kg (1 lb)    Review of systems:     Gen: negative  CV:     negative  GI: negative  :   negative  MS:    negative  Neuro: negative  Pul: negative      A/P  1. Intrauterine pregnancy at 14w6d   2. Pregnancy Risk:  COMPLICATED        Diagnoses and all orders for this visit:    1. 14 weeks gestation of pregnancy (Primary)    2. Screening for blood or protein in urine  -     POC Urinalysis Dipstick    3. Supervision of high risk pregnancy, antepartum    4. Chronic hypertension affecting pregnancy    5. Generalized convulsive seizures (HCC)    6. 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 Obesity in pregnancy, antepartum    8. Vanishing twin syndrome        Nutrition and weight gain were addressed.      -----------------------  PLAN:   No follow-ups on file.      Carlo Sanderson MD  2022 12:04 EST

## 2022-02-21 ENCOUNTER — TELEPHONE (OUTPATIENT)
Dept: OBSTETRICS AND GYNECOLOGY | Age: 27
End: 2022-02-21

## 2022-03-07 ENCOUNTER — ROUTINE PRENATAL (OUTPATIENT)
Dept: OBSTETRICS AND GYNECOLOGY | Age: 27
End: 2022-03-07

## 2022-03-07 VITALS — DIASTOLIC BLOOD PRESSURE: 70 MMHG | SYSTOLIC BLOOD PRESSURE: 122 MMHG | WEIGHT: 228.8 LBS | BODY MASS INDEX: 41.85 KG/M2

## 2022-03-07 DIAGNOSIS — O10.919 CHRONIC HYPERTENSION AFFECTING PREGNANCY: ICD-10-CM

## 2022-03-07 DIAGNOSIS — Z13.89 SCREENING FOR BLOOD OR PROTEIN IN URINE: ICD-10-CM

## 2022-03-07 DIAGNOSIS — O09.219 PREVIOUS PRETERM DELIVERY, ANTEPARTUM: ICD-10-CM

## 2022-03-07 DIAGNOSIS — O09.90 SUPERVISION OF HIGH RISK PREGNANCY, ANTEPARTUM: Primary | ICD-10-CM

## 2022-03-07 LAB
BILIRUB BLD-MCNC: NEGATIVE MG/DL
GLUCOSE UR STRIP-MCNC: NEGATIVE MG/DL
KETONES UR QL: NEGATIVE
LEUKOCYTE EST, POC: NEGATIVE
NITRITE UR-MCNC: NEGATIVE MG/ML
PH UR: 7 [PH] (ref 5–8)
PROT UR STRIP-MCNC: NEGATIVE MG/DL
RBC # UR STRIP: NEGATIVE /UL
SP GR UR: 1.02 (ref 1–1.03)
UROBILINOGEN UR QL: NORMAL

## 2022-03-07 PROCEDURE — 99213 OFFICE O/P EST LOW 20 MIN: CPT | Performed by: NURSE PRACTITIONER

## 2022-03-07 RX ORDER — ONDANSETRON 4 MG/1
TABLET, ORALLY DISINTEGRATING ORAL
Status: ON HOLD | COMMUNITY
Start: 2022-02-02 | End: 2022-08-29

## 2022-03-07 NOTE — PROGRESS NOTES
Chief Complaint   Patient presents with   • Routine Prenatal Visit     20w6d ob check with anatomy ultrasound. Pt is not taking zofran anymore, said her nausea is almost 100% better.       HPI: 26 y.o.  at 20w6d presents for routine OB visit     Feeling well, no complaints. Reports nausea has resolved  Anatomy scan today normal but incomplete- repeat 4 weeks  Reports good fetal movement  Denies ctx, lof, bleeding, cramping.     Vitals:    22 1102   BP: 122/70   Weight: 104 kg (228 lb 12.8 oz)     Total weight gain for pregnancy:  -0.544 kg (-1 lb 3.2 oz)    Review of systems:     Gen: negative  CV:     negative  GI: negative  :   negative  MS:    negative  Neuro: negative  Pul: negative      A/P  1. Intrauterine pregnancy at 20w6d   2. Pregnancy Risk:  HIGH RISK        Diagnoses and all orders for this visit:    1. Supervision of high risk pregnancy, antepartum (Primary)    2. Previous  delivery, antepartum    3. Chronic hypertension affecting pregnancy        Pre-eclampsia symptoms were discussed and warnings were given.   labor was discussed.  Warnings were provided.    -----------------------  PLAN:     4 week repeat anatomy and OB visit  Call for changes or concerns    Caroline Arreguin, APRN  3/7/2022 13:07 EST

## 2022-04-13 ENCOUNTER — TELEPHONE (OUTPATIENT)
Dept: OBSTETRICS AND GYNECOLOGY | Age: 27
End: 2022-04-13

## 2022-04-13 PROBLEM — Z91.199 COMPLIANCE POOR: Status: ACTIVE | Noted: 2022-04-13

## 2022-04-13 NOTE — TELEPHONE ENCOUNTER
Patient has cancelled ob appointments  3/28/22 and now 4/20/22 via [employer]. I have called her twice with no answer and her mailbox is full so I am unable to leave a message. I called her mother and she states she will give Rhina the message to call us.

## 2022-05-09 ENCOUNTER — TELEPHONE (OUTPATIENT)
Dept: OBSTETRICS AND GYNECOLOGY | Age: 27
End: 2022-05-09

## 2022-05-09 ENCOUNTER — DOCUMENTATION (OUTPATIENT)
Dept: OBSTETRICS AND GYNECOLOGY | Age: 27
End: 2022-05-09

## 2022-05-09 NOTE — PROGRESS NOTES
[facility]   Obstetrics and Gynecology     5/9/2022      Patient:  Rhina Marquez   MR#:[id number]    Chart note    Notes are reviewed from the office regarding calls from the patient from today.  I messaged the patient through [employer] clarifying our agenda to provide optimal care.  I offered the patient an appointment with me May 16 and the option for seeing the nurse practitioner this week.                Carlo Sanderson MD   5/9/2022 17:38 EDT

## 2022-05-09 NOTE — TELEPHONE ENCOUNTER
I called pt and it went to a VM that was full so I was unable to leave a message. I tried to call again and it went straight to  and I was unable to leave a message.

## 2022-05-09 NOTE — TELEPHONE ENCOUNTER
"Pt called and states \" I need to make an appt because I haven't been seen in a while\" Pt is currently 29.6 wks pregnant. We last saw her at 20.6 wks. I have tried to call pt numerous times with no contact. I asked pt why she has been canceling appts and she states \" I didn't realize it was the doctors business why I reschedule appts.\" Pt demanded to be seen on Tuesday 5/10/22 and I explained that I would need to get approval to put her on since she is a Dr. Sanderson pt and pt started yelling at me and said \"Don't worry about it I won't be coming back.\" Can you please advise?  "

## 2022-05-11 ENCOUNTER — ROUTINE PRENATAL (OUTPATIENT)
Dept: OBSTETRICS AND GYNECOLOGY | Age: 27
End: 2022-05-11

## 2022-05-11 VITALS — DIASTOLIC BLOOD PRESSURE: 84 MMHG | SYSTOLIC BLOOD PRESSURE: 132 MMHG | WEIGHT: 232.4 LBS | BODY MASS INDEX: 42.51 KG/M2

## 2022-05-11 DIAGNOSIS — Z3A.31 31 WEEKS GESTATION OF PREGNANCY: ICD-10-CM

## 2022-05-11 DIAGNOSIS — Z13.89 SCREENING FOR BLOOD OR PROTEIN IN URINE: ICD-10-CM

## 2022-05-11 DIAGNOSIS — O10.919 CHRONIC HYPERTENSION AFFECTING PREGNANCY: ICD-10-CM

## 2022-05-11 DIAGNOSIS — Z91.199 COMPLIANCE POOR: ICD-10-CM

## 2022-05-11 DIAGNOSIS — O09.299 HX OF PREECLAMPSIA, PRIOR PREGNANCY, CURRENTLY PREGNANT: ICD-10-CM

## 2022-05-11 DIAGNOSIS — O09.90 SUPERVISION OF HIGH RISK PREGNANCY, ANTEPARTUM: Primary | ICD-10-CM

## 2022-05-11 PROCEDURE — 99213 OFFICE O/P EST LOW 20 MIN: CPT | Performed by: NURSE PRACTITIONER

## 2022-05-11 NOTE — PROGRESS NOTES
Chief Complaint   Patient presents with   • Routine Prenatal Visit     30w1d ob check with growth ultrasound. 1 hour gtt today.        HPI: 27 y.o.  at 30w1d presents for routine OB visit    Rhina has not been seen since 20w6d. She reports she has had transportation issues. Discussed importance of compliance and keeping routine OB visits for the safety of her and the baby especially due to her complex medical history. She understands and agrees- states has better access to transportation now.    Growth scan today- EFW 61%. NOE 17. Vertex.    1 hour gtt today. CMP, CBC, and 24 hour urine - instructions given  Requests to defer Tdap to next visit    She states she has overall been doing well. Denies headaches, swelling. Good fetal movement    Vitals:    22 1328   BP: 132/84   Weight: 105 kg (232 lb 6.4 oz)     Total weight gain for pregnancy:  1.089 kg (2 lb 6.4 oz)    Review of systems:     Gen: negative  CV:     negative  GI: negative  :   good fetal movement noted   MS:    negative  Neuro: negative  Pul: negative      A/P  1. Intrauterine pregnancy at 30w1d   2. Pregnancy Risk:  HIGH RISK    Blood pressure is out of parameters.      Diagnoses and all orders for this visit:    1. Supervision of high risk pregnancy, antepartum (Primary)    2. Screening for blood or protein in urine  -     POC Urinalysis Dipstick    3. 31 weeks gestation of pregnancy  -     Gestational Screen 1 Hr ([facility])  -     CBC (No Diff)  -     Comprehensive Metabolic Panel  -     Antibody Screen    4. Compliance poor    5. Chronic hypertension affecting pregnancy    6. Hx of preeclampsia, prior pregnancy, currently pregnant    Other orders  -     Tdap Vaccine Greater Than or Equal To 8yo IM        Pre-eclampsia symptoms were discussed and warnings were given.   labor was discussed.  Warnings were provided.  [facility] encouraged    -----------------------  PLAN:     OB visit with Dr. Sanderson in 5 days      Caroline Arreguin  APRN  5/11/2022 14:10 EDT

## 2022-05-12 LAB
ALBUMIN SERPL-MCNC: 3.8 G/DL (ref 3.9–5)
ALBUMIN/GLOB SERPL: 1.7 {RATIO} (ref 1.2–2.2)
ALP SERPL-CCNC: 87 IU/L (ref 44–121)
ALT SERPL-CCNC: 12 IU/L (ref 0–32)
AST SERPL-CCNC: 14 IU/L (ref 0–40)
BILIRUB SERPL-MCNC: 0.2 MG/DL (ref 0–1.2)
BLD GP AB SCN SERPL QL: NEGATIVE
BUN SERPL-MCNC: 10 MG/DL (ref 6–20)
BUN/CREAT SERPL: 22 (ref 9–23)
CALCIUM SERPL-MCNC: 8.8 MG/DL (ref 8.7–10.2)
CHLORIDE SERPL-SCNC: 104 MMOL/L (ref 96–106)
CO2 SERPL-SCNC: 19 MMOL/L (ref 20–29)
CREAT SERPL-MCNC: 0.45 MG/DL (ref 0.57–1)
EGFRCR SERPLBLD CKD-EPI 2021: 137 ML/MIN/1.73
ERYTHROCYTE [DISTWIDTH] IN BLOOD BY AUTOMATED COUNT: 12.7 % (ref 11.7–15.4)
GLOBULIN SER CALC-MCNC: 2.3 G/DL (ref 1.5–4.5)
GLUCOSE 1H P 50 G GLC PO SERPL-MCNC: 123 MG/DL (ref 65–139)
GLUCOSE SERPL-MCNC: 123 MG/DL (ref 65–99)
HCT VFR BLD AUTO: 33.2 % (ref 34–46.6)
HGB BLD-MCNC: 11.5 G/DL (ref 11.1–15.9)
MCH RBC QN AUTO: 29.6 PG (ref 26.6–33)
MCHC RBC AUTO-ENTMCNC: 34.6 G/DL (ref 31.5–35.7)
MCV RBC AUTO: 85 FL (ref 79–97)
PLATELET # BLD AUTO: 321 X10E3/UL (ref 150–450)
POTASSIUM SERPL-SCNC: 4 MMOL/L (ref 3.5–5.2)
PROT SERPL-MCNC: 6.1 G/DL (ref 6–8.5)
RBC # BLD AUTO: 3.89 X10E6/UL (ref 3.77–5.28)
SODIUM SERPL-SCNC: 140 MMOL/L (ref 134–144)
WBC # BLD AUTO: 14.1 X10E3/UL (ref 3.4–10.8)

## 2022-05-16 ENCOUNTER — ROUTINE PRENATAL (OUTPATIENT)
Dept: OBSTETRICS AND GYNECOLOGY | Age: 27
End: 2022-05-16

## 2022-05-16 VITALS — SYSTOLIC BLOOD PRESSURE: 126 MMHG | WEIGHT: 231 LBS | DIASTOLIC BLOOD PRESSURE: 72 MMHG | BODY MASS INDEX: 42.25 KG/M2

## 2022-05-16 DIAGNOSIS — O09.299 HX OF PREECLAMPSIA, PRIOR PREGNANCY, CURRENTLY PREGNANT: ICD-10-CM

## 2022-05-16 DIAGNOSIS — Z91.199 COMPLIANCE POOR: ICD-10-CM

## 2022-05-16 DIAGNOSIS — R56.9 GENERALIZED CONVULSIVE SEIZURES: ICD-10-CM

## 2022-05-16 DIAGNOSIS — O10.919 CHRONIC HYPERTENSION AFFECTING PREGNANCY: ICD-10-CM

## 2022-05-16 DIAGNOSIS — Z13.89 SCREENING FOR BLOOD OR PROTEIN IN URINE: ICD-10-CM

## 2022-05-16 DIAGNOSIS — Z3A.30 30 WEEKS GESTATION OF PREGNANCY: Primary | ICD-10-CM

## 2022-05-16 DIAGNOSIS — O09.90 SUPERVISION OF HIGH RISK PREGNANCY, ANTEPARTUM: ICD-10-CM

## 2022-05-16 DIAGNOSIS — O31.10X0 VANISHING TWIN SYNDROME: ICD-10-CM

## 2022-05-16 DIAGNOSIS — O99.210 OBESITY IN PREGNANCY, ANTEPARTUM: ICD-10-CM

## 2022-05-16 DIAGNOSIS — Z30.2 REQUEST FOR STERILIZATION: ICD-10-CM

## 2022-05-16 DIAGNOSIS — O26.893 HEADACHE IN PREGNANCY, ANTEPARTUM, THIRD TRIMESTER: ICD-10-CM

## 2022-05-16 DIAGNOSIS — R51.9 HEADACHE IN PREGNANCY, ANTEPARTUM, THIRD TRIMESTER: ICD-10-CM

## 2022-05-16 DIAGNOSIS — O47.00 PRETERM UTERINE CONTRACTIONS, ANTEPARTUM: ICD-10-CM

## 2022-05-16 LAB
BILIRUB BLD-MCNC: ABNORMAL MG/DL
CLARITY, POC: CLEAR
COLOR UR: YELLOW
GLUCOSE UR STRIP-MCNC: NEGATIVE MG/DL
KETONES UR QL: NEGATIVE
LEUKOCYTE EST, POC: ABNORMAL
NITRITE UR-MCNC: NEGATIVE MG/ML
PH UR: 6 [PH] (ref 5–8)
PROT UR STRIP-MCNC: ABNORMAL MG/DL
RBC # UR STRIP: NEGATIVE /UL
SP GR UR: 1.03 (ref 1–1.03)
UROBILINOGEN UR QL: NORMAL

## 2022-05-16 PROCEDURE — 99214 OFFICE O/P EST MOD 30 MIN: CPT | Performed by: OBSTETRICS & GYNECOLOGY

## 2022-05-16 NOTE — PROGRESS NOTES
2022    OB Ultrasound Abstraction Entry     Patient:  Rhina Marquez  MR#:[id number]    27 y.o.  Estimated Date of Delivery: 22       Indication:   Chronic hypertension in pregnancy     Patient Active Problem List   Diagnosis   • Generalized convulsive seizures (HCC)   • Hx of preeclampsia, prior pregnancy, currently pregnant   • Chronic hypertension affecting pregnancy   • Chronic headaches   • Pituitary gland enlarged (HCC)   • Susceptible to varicella (non-immune), currently pregnant   • Obesity in pregnancy, antepartum   • Previous  delivery, antepartum   • Carrier of fragile X syndrome   • Supervision of high risk pregnancy, antepartum   • Pregnancy   • History of varicella vaccination   • Vanishing twin syndrome   • Genetic screening   • 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

## 2022-05-16 NOTE — PROGRESS NOTES
Chief Complaint   Patient presents with   • Routine Prenatal Visit     No cc        HPI: 27 y.o.  at 30w6d presents for regular OB visit reporting moderate to severe headache for the past 3 to 4 days that just resolved this morning.  The patient denies visual changes and reports feeling well now.  The patient is normotensive with 1+ proteinuria.  24-hour urine is submitted for baseline studies.    The patient had a long interval of no care because of logistic problems with her home.  She has 4 children and no one to watch the kids for her appointments.  More recently her mom's been more cooperative and willing to watch the children while she comes to her appointments.  We discussed risk factors with chronic hypertension and risk factors for preeclampsia and need for regular surveillance during the remainder of the pregnancy.  The patient also had a previous  child and recently has had more episodes of  contractions with concerns for  labor.    Vitals:    22 1056   BP: 126/72   Weight: 105 kg (231 lb)     Total weight gain for pregnancy:  0.454 kg (1 lb)    Review of systems:     Gen: negative  CV:     negative  GI: negative  :   good fetal movement noted , tasneem kwon type contractions, pelvic pressure and pelvic cramping  MS:    back pain   Neuro: headache  Pul: negative      A/P  1. Intrauterine pregnancy at 30w6d   2. Pregnancy Risk:  HIGH RISK        Diagnoses and all orders for this visit:    1. 30 weeks gestation of pregnancy (Primary)    2. Chronic hypertension affecting pregnancy  -     Cancel: LC Protein Total 24Hr Urine - Urine, Clean Catch  -     Protein, Urine, 24 Hour - Urine, Clean Catch    3. Screening for blood or protein in urine  -     POC Urinalysis Dipstick    4. Supervision of high risk pregnancy, antepartum    5. Compliance poor    6. Generalized convulsive seizures (HCC)    7. Hx of preeclampsia, prior pregnancy, currently pregnant    8. Obesity in  pregnancy, antepartum    9. Vanishing twin syndrome    10.  uterine contractions, antepartum    11. Headache in pregnancy, antepartum, third trimester    12. Request for sterilization        Pre-eclampsia symptoms were discussed and warnings were given.   labor was discussed.  Warnings were provided.  Nutrition and weight gain were addressed.      -----------------------  PLAN:   Return in about 2 weeks (around 2022) for OB check and BPP.      Carlo Sanderson MD  2022 11:55 EDT

## 2022-05-17 LAB
PROT 24H UR-MRATE: 100 MG/24 HR (ref 30–150)
PROT UR-MCNC: 7.4 MG/DL

## 2022-05-25 ENCOUNTER — HOSPITAL ENCOUNTER (EMERGENCY)
Facility: HOSPITAL | Age: 27
Discharge: HOME | End: 2022-05-25
Attending: OBSTETRICS & GYNECOLOGY | Admitting: OBSTETRICS & GYNECOLOGY

## 2022-05-25 VITALS
TEMPERATURE: 100 F | HEART RATE: 89 BPM | RESPIRATION RATE: 16 BRPM | BODY MASS INDEX: 42.25 KG/M2 | SYSTOLIC BLOOD PRESSURE: 123 MMHG | DIASTOLIC BLOOD PRESSURE: 72 MMHG | HEIGHT: 62 IN | OXYGEN SATURATION: 99 %

## 2022-05-25 LAB
BACTERIA UR QL AUTO: ABNORMAL /HPF
BILIRUB UR QL STRIP: NEGATIVE
BLOODY SPECIMEN?: NORMAL
CLARITY UR: CLEAR
COLOR UR: YELLOW
FIBRONECTIN FETAL VAG QL: NEGATIVE
GLUCOSE UR STRIP-MCNC: NEGATIVE MG/DL
HGB UR QL STRIP.AUTO: NEGATIVE
HYALINE CASTS UR QL AUTO: ABNORMAL /LPF
KETONES UR QL STRIP: ABNORMAL
LEUKOCYTE ESTERASE UR QL STRIP.AUTO: ABNORMAL
NITRITE UR QL STRIP: NEGATIVE
PH UR STRIP.AUTO: 6.5 [PH] (ref 5–8)
PROT UR QL STRIP: NEGATIVE
RBC # UR STRIP: ABNORMAL /HPF
REF LAB TEST METHOD: ABNORMAL
SP GR UR STRIP: 1.02 (ref 1–1.03)
SQUAMOUS #/AREA URNS HPF: ABNORMAL /HPF
UROBILINOGEN UR QL STRIP: ABNORMAL
WBC # UR STRIP: ABNORMAL /HPF

## 2022-05-25 PROCEDURE — 82731 ASSAY OF FETAL FIBRONECTIN: CPT | Performed by: OBSTETRICS & GYNECOLOGY

## 2022-05-25 PROCEDURE — 96372 THER/PROPH/DIAG INJ SC/IM: CPT | Performed by: OBSTETRICS & GYNECOLOGY

## 2022-05-25 PROCEDURE — 59025 FETAL NON-STRESS TEST: CPT

## 2022-05-25 PROCEDURE — 99284 EMERGENCY DEPT VISIT MOD MDM: CPT | Performed by: OBSTETRICS & GYNECOLOGY

## 2022-05-25 PROCEDURE — 81001 URINALYSIS AUTO W/SCOPE: CPT | Performed by: OBSTETRICS & GYNECOLOGY

## 2022-05-25 PROCEDURE — 25010000002 BETAMETHASONE ACET & SOD PHOS PER 4 MG: Performed by: OBSTETRICS & GYNECOLOGY

## 2022-05-25 PROCEDURE — 87086 URINE CULTURE/COLONY COUNT: CPT | Performed by: OBSTETRICS & GYNECOLOGY

## 2022-05-25 RX ORDER — BETAMETHASONE SODIUM PHOSPHATE AND BETAMETHASONE ACETATE 3; 3 MG/ML; MG/ML
12 INJECTION, SUSPENSION INTRA-ARTICULAR; INTRALESIONAL; INTRAMUSCULAR; SOFT TISSUE EVERY 24 HOURS
Status: DISCONTINUED | OUTPATIENT
Start: 2022-05-25 | End: 2022-05-26 | Stop reason: HOSPADM

## 2022-05-25 RX ADMIN — BETAMETHASONE SODIUM PHOSPHATE AND BETAMETHASONE ACETATE 12 MG: 3; 3 INJECTION, SUSPENSION INTRA-ARTICULAR; INTRALESIONAL; INTRAMUSCULAR at 22:16

## 2022-05-26 ENCOUNTER — HOSPITAL ENCOUNTER (OUTPATIENT)
Facility: HOSPITAL | Age: 27
Discharge: HOME | End: 2022-05-26
Attending: OBSTETRICS & GYNECOLOGY | Admitting: OBSTETRICS & GYNECOLOGY

## 2022-05-26 VITALS
HEART RATE: 104 BPM | RESPIRATION RATE: 17 BRPM | TEMPERATURE: 98.9 F | DIASTOLIC BLOOD PRESSURE: 73 MMHG | BODY MASS INDEX: 42.07 KG/M2 | WEIGHT: 230 LBS | SYSTOLIC BLOOD PRESSURE: 117 MMHG

## 2022-05-26 LAB — BACTERIA SPEC AEROBE CULT: NORMAL

## 2022-05-26 PROCEDURE — 96372 THER/PROPH/DIAG INJ SC/IM: CPT

## 2022-05-26 PROCEDURE — 25010000002 BETAMETHASONE ACET & SOD PHOS PER 4 MG: Performed by: OBSTETRICS & GYNECOLOGY

## 2022-05-26 PROCEDURE — 59025 FETAL NON-STRESS TEST: CPT

## 2022-05-26 PROCEDURE — 59025 FETAL NON-STRESS TEST: CPT | Performed by: OBSTETRICS & GYNECOLOGY

## 2022-05-26 PROCEDURE — G0378 HOSPITAL OBSERVATION PER HR: HCPCS

## 2022-05-26 RX ORDER — BETAMETHASONE SODIUM PHOSPHATE AND BETAMETHASONE ACETATE 3; 3 MG/ML; MG/ML
12 INJECTION, SUSPENSION INTRA-ARTICULAR; INTRALESIONAL; INTRAMUSCULAR; SOFT TISSUE EVERY 24 HOURS
Status: DISCONTINUED | OUTPATIENT
Start: 2022-05-26 | End: 2022-05-26 | Stop reason: HOSPADM

## 2022-05-26 RX ADMIN — BETAMETHASONE SODIUM PHOSPHATE AND BETAMETHASONE ACETATE 12 MG: 3; 3 INJECTION, SUSPENSION INTRA-ARTICULAR; INTRALESIONAL; INTRAMUSCULAR at 20:24

## 2022-05-27 NOTE — NON STRESS TEST
Rhina Marquez, a  at 32w2d with an MICHAEL of 2022, [place] (Date entered prior to episode creation), was seen at [facility] DEL[facility] for a nonstress test.    Chief Complaint   Patient presents with   • BMZ injection #2     Pt. Reports as outpatient for second BMZ dose. Pt. +FM and denies LOF or VB       Patient Active Problem List   Diagnosis   • Generalized convulsive seizures (HCC)   • Hx of preeclampsia, prior pregnancy, currently pregnant   • Chronic hypertension affecting pregnancy   • Chronic headaches   • Pituitary gland enlarged (HCC)   • Susceptible to varicella (non-immune), currently pregnant   • Obesity in pregnancy, antepartum   • Previous  delivery, antepartum   • Carrier of fragile X syndrome   • Supervision of high risk pregnancy, antepartum   • Pregnancy   • History of varicella vaccination   • Vanishing twin syndrome   • Genetic screening   • 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

## 2022-06-01 ENCOUNTER — ROUTINE PRENATAL (OUTPATIENT)
Dept: OBSTETRICS AND GYNECOLOGY | Age: 27
End: 2022-06-01

## 2022-06-01 VITALS — BODY MASS INDEX: 43.53 KG/M2 | DIASTOLIC BLOOD PRESSURE: 76 MMHG | SYSTOLIC BLOOD PRESSURE: 124 MMHG | WEIGHT: 238 LBS

## 2022-06-01 DIAGNOSIS — Z14.8 CARRIER OF FRAGILE X SYNDROME: ICD-10-CM

## 2022-06-01 DIAGNOSIS — O10.919 CHRONIC HYPERTENSION AFFECTING PREGNANCY: ICD-10-CM

## 2022-06-01 DIAGNOSIS — Z15.89 MUTATION OF FMR1 GENE WITH GREATER THAN 55 CGG TRINUCLEOTIDE REPEATS: ICD-10-CM

## 2022-06-01 DIAGNOSIS — O99.210 OBESITY IN PREGNANCY, ANTEPARTUM: ICD-10-CM

## 2022-06-01 DIAGNOSIS — G44.041 INTRACTABLE CHRONIC PAROXYSMAL HEMICRANIA: ICD-10-CM

## 2022-06-01 DIAGNOSIS — Z13.89 SCREENING FOR BLOOD OR PROTEIN IN URINE: ICD-10-CM

## 2022-06-01 DIAGNOSIS — Z91.199 COMPLIANCE POOR: ICD-10-CM

## 2022-06-01 DIAGNOSIS — Z3A.33 33 WEEKS GESTATION OF PREGNANCY: Primary | ICD-10-CM

## 2022-06-01 DIAGNOSIS — O09.90 SUPERVISION OF HIGH RISK PREGNANCY, ANTEPARTUM: ICD-10-CM

## 2022-06-01 LAB
BILIRUB BLD-MCNC: NEGATIVE MG/DL
CLARITY, POC: CLEAR
COLOR UR: YELLOW
GLUCOSE UR STRIP-MCNC: NEGATIVE MG/DL
KETONES UR QL: NEGATIVE
LEUKOCYTE EST, POC: ABNORMAL
NITRITE UR-MCNC: NEGATIVE MG/ML
PH UR: 6.5 [PH] (ref 5–8)
PROT UR STRIP-MCNC: NEGATIVE MG/DL
RBC # UR STRIP: NEGATIVE /UL
SP GR UR: 1.02 (ref 1–1.03)
UROBILINOGEN UR QL: NORMAL

## 2022-06-01 PROCEDURE — 99214 OFFICE O/P EST MOD 30 MIN: CPT | Performed by: OBSTETRICS & GYNECOLOGY

## 2022-06-05 ENCOUNTER — HOSPITAL ENCOUNTER (OUTPATIENT)
Facility: HOSPITAL | Age: 27
Setting detail: OBSERVATION
Discharge: HOME | End: 2022-06-06
Attending: OBSTETRICS & GYNECOLOGY | Admitting: STUDENT IN AN ORGANIZED HEALTH CARE EDUCATION/TRAINING PROGRAM

## 2022-06-05 PROBLEM — Z34.90 PREGNANT: Status: ACTIVE | Noted: 2022-06-05

## 2022-06-05 PROBLEM — O60.03 PRETERM LABOR IN THIRD TRIMESTER WITHOUT DELIVERY: Status: ACTIVE | Noted: 2022-06-05

## 2022-06-05 LAB
ABO GROUP BLD: NORMAL
ALBUMIN SERPL-MCNC: 3.9 G/DL (ref 3.5–5.2)
ALBUMIN/GLOB SERPL: 1.2 G/DL
ALP SERPL-CCNC: 114 U/L (ref 39–117)
ALT SERPL W P-5'-P-CCNC: 23 U/L (ref 1–33)
ANION GAP SERPL CALCULATED.3IONS-SCNC: 12 MMOL/L (ref 5–15)
AST SERPL-CCNC: 15 U/L (ref 1–32)
BACTERIA UR QL AUTO: ABNORMAL /HPF
BASOPHILS # BLD AUTO: 0.07 10*3/MM3 (ref 0–0.2)
BASOPHILS NFR BLD AUTO: 0.5 % (ref 0–1.5)
BILIRUB SERPL-MCNC: 0.3 MG/DL (ref 0–1.2)
BILIRUB UR QL STRIP: NEGATIVE
BLD GP AB SCN SERPL QL: NEGATIVE
BUN SERPL-MCNC: 10 MG/DL (ref 6–20)
BUN/CREAT SERPL: 21.3 (ref 7–25)
CALCIUM SPEC-SCNC: 9.5 MG/DL (ref 8.6–10.5)
CHLORIDE SERPL-SCNC: 103 MMOL/L (ref 98–107)
CLARITY UR: CLEAR
CO2 SERPL-SCNC: 20 MMOL/L (ref 22–29)
COLOR UR: YELLOW
CREAT SERPL-MCNC: 0.47 MG/DL (ref 0.57–1)
DEPRECATED RDW RBC AUTO: 38.1 FL (ref 37–54)
EGFRCR SERPLBLD CKD-EPI 2021: 135.7 ML/MIN/1.73
EOSINOPHIL # BLD AUTO: 0.15 10*3/MM3 (ref 0–0.4)
EOSINOPHIL NFR BLD AUTO: 1 % (ref 0.3–6.2)
ERYTHROCYTE [DISTWIDTH] IN BLOOD BY AUTOMATED COUNT: 12.8 % (ref 12.3–15.4)
GLOBULIN UR ELPH-MCNC: 3.2 GM/DL
GLUCOSE SERPL-MCNC: 87 MG/DL (ref 65–99)
GLUCOSE UR STRIP-MCNC: NEGATIVE MG/DL
HCT VFR BLD AUTO: 34.9 % (ref 34–46.6)
HGB BLD-MCNC: 11.8 G/DL (ref 12–15.9)
HGB UR QL STRIP.AUTO: ABNORMAL
HYALINE CASTS UR QL AUTO: ABNORMAL /LPF
IMM GRANULOCYTES # BLD AUTO: 0.31 10*3/MM3 (ref 0–0.05)
IMM GRANULOCYTES NFR BLD AUTO: 2 % (ref 0–0.5)
KETONES UR QL STRIP: ABNORMAL
LEUKOCYTE ESTERASE UR QL STRIP.AUTO: ABNORMAL
LYMPHOCYTES # BLD AUTO: 2.25 10*3/MM3 (ref 0.7–3.1)
LYMPHOCYTES NFR BLD AUTO: 14.8 % (ref 19.6–45.3)
MCH RBC QN AUTO: 27.8 PG (ref 26.6–33)
MCHC RBC AUTO-ENTMCNC: 33.8 G/DL (ref 31.5–35.7)
MCV RBC AUTO: 82.1 FL (ref 79–97)
MONOCYTES # BLD AUTO: 1.11 10*3/MM3 (ref 0.1–0.9)
MONOCYTES NFR BLD AUTO: 7.3 % (ref 5–12)
NEUTROPHILS NFR BLD AUTO: 11.3 10*3/MM3 (ref 1.7–7)
NEUTROPHILS NFR BLD AUTO: 74.4 % (ref 42.7–76)
NITRITE UR QL STRIP: NEGATIVE
NRBC BLD AUTO-RTO: 0.2 /100 WBC (ref 0–0.2)
PH UR STRIP.AUTO: 5.5 [PH] (ref 5–8)
PLATELET # BLD AUTO: 317 10*3/MM3 (ref 140–450)
PMV BLD AUTO: 10.2 FL (ref 6–12)
POTASSIUM SERPL-SCNC: 4 MMOL/L (ref 3.5–5.2)
PROT SERPL-MCNC: 7.1 G/DL (ref 6–8.5)
PROT UR QL STRIP: ABNORMAL
RBC # BLD AUTO: 4.25 10*6/MM3 (ref 3.77–5.28)
RBC # UR STRIP: ABNORMAL /HPF
REF LAB TEST METHOD: ABNORMAL
RH BLD: POSITIVE
SARS-COV-2 RNA RESP QL NAA+PROBE: NOT DETECTED
SODIUM SERPL-SCNC: 135 MMOL/L (ref 136–145)
SP GR UR STRIP: 1.03 (ref 1–1.03)
SQUAMOUS #/AREA URNS HPF: ABNORMAL /HPF
T&S EXPIRATION DATE: NORMAL
UROBILINOGEN UR QL STRIP: ABNORMAL
WBC # UR STRIP: ABNORMAL /HPF
WBC NRBC COR # BLD: 15.19 10*3/MM3 (ref 3.4–10.8)

## 2022-06-05 PROCEDURE — 87086 URINE CULTURE/COLONY COUNT: CPT | Performed by: OBSTETRICS & GYNECOLOGY

## 2022-06-05 PROCEDURE — 59025 FETAL NON-STRESS TEST: CPT | Performed by: OBSTETRICS & GYNECOLOGY

## 2022-06-05 PROCEDURE — 80053 COMPREHEN METABOLIC PANEL: CPT | Performed by: OBSTETRICS & GYNECOLOGY

## 2022-06-05 PROCEDURE — G0378 HOSPITAL OBSERVATION PER HR: HCPCS

## 2022-06-05 PROCEDURE — 96366 THER/PROPH/DIAG IV INF ADDON: CPT

## 2022-06-05 PROCEDURE — 59025 FETAL NON-STRESS TEST: CPT

## 2022-06-05 PROCEDURE — 85025 COMPLETE CBC W/AUTO DIFF WBC: CPT | Performed by: OBSTETRICS & GYNECOLOGY

## 2022-06-05 PROCEDURE — 99219 PR INITIAL OBSERVATION CARE/DAY 50 MINUTES: CPT | Performed by: OBSTETRICS & GYNECOLOGY

## 2022-06-05 PROCEDURE — 86850 RBC ANTIBODY SCREEN: CPT | Performed by: OBSTETRICS & GYNECOLOGY

## 2022-06-05 PROCEDURE — 86900 BLOOD TYPING SEROLOGIC ABO: CPT | Performed by: OBSTETRICS & GYNECOLOGY

## 2022-06-05 PROCEDURE — U0003 INFECTIOUS AGENT DETECTION BY NUCLEIC ACID (DNA OR RNA); SEVERE ACUTE RESPIRATORY SYNDROME CORONAVIRUS 2 (SARS-COV-2) (CORONAVIRUS DISEASE [COVID-19]), AMPLIFIED PROBE TECHNIQUE, MAKING USE OF HIGH THROUGHPUT TECHNOLOGIES AS DESCRIBED BY CMS-2020-01-R: HCPCS | Performed by: OBSTETRICS & GYNECOLOGY

## 2022-06-05 PROCEDURE — 86901 BLOOD TYPING SEROLOGIC RH(D): CPT | Performed by: OBSTETRICS & GYNECOLOGY

## 2022-06-05 PROCEDURE — 0 MAGNESIUM SULFATE 20 GM/500ML SOLUTION: Performed by: OBSTETRICS & GYNECOLOGY

## 2022-06-05 PROCEDURE — 96361 HYDRATE IV INFUSION ADD-ON: CPT

## 2022-06-05 PROCEDURE — 96365 THER/PROPH/DIAG IV INF INIT: CPT

## 2022-06-05 PROCEDURE — C9803 HOPD COVID-19 SPEC COLLECT: HCPCS

## 2022-06-05 PROCEDURE — 81001 URINALYSIS AUTO W/SCOPE: CPT | Performed by: OBSTETRICS & GYNECOLOGY

## 2022-06-05 RX ORDER — MAGNESIUM SULFATE HEPTAHYDRATE 40 MG/ML
2 INJECTION, SOLUTION INTRAVENOUS CONTINUOUS
Status: DISCONTINUED | OUTPATIENT
Start: 2022-06-05 | End: 2022-06-05

## 2022-06-05 RX ORDER — LABETALOL 200 MG/1
200 TABLET, FILM COATED ORAL EVERY 12 HOURS SCHEDULED
Status: DISCONTINUED | OUTPATIENT
Start: 2022-06-05 | End: 2022-06-06 | Stop reason: HOSPADM

## 2022-06-05 RX ORDER — ACETAMINOPHEN 500 MG
1000 TABLET ORAL EVERY 6 HOURS PRN
Status: DISCONTINUED | OUTPATIENT
Start: 2022-06-05 | End: 2022-06-06 | Stop reason: HOSPADM

## 2022-06-05 RX ORDER — SODIUM CHLORIDE 0.9 % (FLUSH) 0.9 %
10 SYRINGE (ML) INJECTION EVERY 8 HOURS
Status: DISCONTINUED | OUTPATIENT
Start: 2022-06-05 | End: 2022-06-06 | Stop reason: HOSPADM

## 2022-06-05 RX ORDER — SODIUM CHLORIDE, SODIUM LACTATE, POTASSIUM CHLORIDE, CALCIUM CHLORIDE 600; 310; 30; 20 MG/100ML; MG/100ML; MG/100ML; MG/100ML
75 INJECTION, SOLUTION INTRAVENOUS CONTINUOUS
Status: DISCONTINUED | OUTPATIENT
Start: 2022-06-05 | End: 2022-06-05

## 2022-06-05 RX ORDER — SODIUM CHLORIDE 0.9 % (FLUSH) 0.9 %
10 SYRINGE (ML) INJECTION AS NEEDED
Status: DISCONTINUED | OUTPATIENT
Start: 2022-06-05 | End: 2022-06-06 | Stop reason: HOSPADM

## 2022-06-05 RX ADMIN — SODIUM CHLORIDE, POTASSIUM CHLORIDE, SODIUM LACTATE AND CALCIUM CHLORIDE 75 ML/HR: 600; 310; 30; 20 INJECTION, SOLUTION INTRAVENOUS at 06:09

## 2022-06-05 RX ADMIN — LABETALOL HYDROCHLORIDE 200 MG: 200 TABLET, FILM COATED ORAL at 11:23

## 2022-06-05 RX ADMIN — SODIUM CHLORIDE, POTASSIUM CHLORIDE, SODIUM LACTATE AND CALCIUM CHLORIDE 1000 ML: 600; 310; 30; 20 INJECTION, SOLUTION INTRAVENOUS at 04:16

## 2022-06-05 RX ADMIN — ACETAMINOPHEN 1000 MG: 500 TABLET ORAL at 11:46

## 2022-06-05 RX ADMIN — Medication 10 ML: at 20:59

## 2022-06-05 RX ADMIN — LABETALOL HYDROCHLORIDE 200 MG: 200 TABLET, FILM COATED ORAL at 23:28

## 2022-06-05 RX ADMIN — MAGNESIUM SULFATE IN WATER 2 G/HR: 40 INJECTION, SOLUTION INTRAVENOUS at 15:20

## 2022-06-05 NOTE — OBED NOTES
MAYTE Note OB        Patient Name: Rhina Marquez  YOB: 1995  MRN: [id number]  Admission Date: 2022  2:59 AM  Date of Service: 2022    Chief Complaint: Contractions (MAYTE- pt reports having contactions start at 2330 and for 1 hour they have been 2-5 min apart rating 3/10. Scant amount of spotting noted following intercourse tonight. Pt rpt +FM; denies LOF)        Subjective     Rhina Marquez is a 27 y.o. female  at 33w5d with Estimated Date of Delivery: 22 who presents with the chief complaint listed above.  She sees Carlo Sanderson MD for her prenatal care. 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  Patient presents to OB ER tonight secondary to contractions that started about 11:30 PM and have been about 2 to 5 minutes apart rating them 3 out of 10.  Is also noted to have a scant amount of spotting which began to occur after intercourse tonight.  She has a prior history of 34-week delivery x 2.  She is demonstrating irregular contractions on the monitor.  She describes normal fetal movement.  She denies any rupture of membranes or leaking fluid. She has a history of receiving steroids on May 25 and May 26.               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]    • History of varicella vaccination [Z92.29]    • Pregnancy [Z34.90]    • Supervision of high risk pregnancy, antepartum [O09.90]    • Carrier of fragile X syndrome [Z14.8]    • Obesity in pregnancy, antepartum [O99.210]    • Previous  delivery, antepartum [O09.219]    •  Susceptible to varicella (non-immune), currently pregnant [O09.899, Z28.39]    • Pituitary gland enlarged (HCC) [E23.6]    • Chronic hypertension affecting pregnancy [O10.919]    • Chronic headaches [R51.9, G89.29]    • Hx of preeclampsia, prior pregnancy, currently pregnant [O09.299]    • Generalized convulsive seizures (HCC) [R56.9]         OB History    Para Term  AB Living   6 4 2 2 1 4   SAB IAB Ectopic Molar Multiple Live Births   1 0 0 0 0 4      # Outcome Date GA Lbr Avinash/2nd Weight Sex Delivery Anes PTL Lv   6 Current            5  // 34w2d   M Vag-Spont  Y JOAN   4  / 34w6d 03:03 / 00:16 3162 g (6 lb 15.5 oz) M Vag-Spont EPI Y JOAN      Birth Comments: del note reviewed - no comps -[facility]        Complications: Preeclampsia in postpartum period      Name: Yandel       Apgar1: 8  Apgar5: 9   3 Term 16 37w0d 03:02 / 00:13 3756 g (8 lb 4.5 oz) F Vag-Spont EPI N JOAN      Birth Comments: delivery note reviewed - no comps - [facility]       Complications: Preeclampsia      Name: Siri       Apgar1: 9  Apgar5: 9   2 Term 11/05/15 37w0d  2906 g (6 lb 6.5 oz) F Vag-Spont   JOAN      Birth Comments: Delivery note reviewed.  No complications HB      Complications: Preeclampsia      Name: CINTHIA      Apgar1: 8  Apgar5: 9   1 SAB                 Past Medical History:   Diagnosis Date   • Anxiety    • Asthma    • Convulsive seizure (HCC)     Neurologic workup to date is inconclusive for seizure disorder, patient did not follow through with EEG, saw neurologist Dr. Willam Calderon   • Depression     hx when younger   • Eye infection    • Gestational hypertension    • Mild preeclampsia    • Pre-eclampsia in postpartum period 2018   • Preeclampsia 2015   • Rubella non-immune status, antepartum        Past Surgical History:   Procedure Laterality Date   • EAR TUBES         No current facility-administered medications on file prior to encounter.     Current Outpatient  Medications on File Prior to Encounter   Medication Sig Dispense Refill   • labetalol (NORMODYNE) 200 MG tablet Take 1 tablet by mouth 3 (Three) Times a Day. (Patient taking differently: Take 200 mg by mouth 2 (Two) Times a Day.) 90 tablet 5   • PRENATAL GUMMY VITAMIN Chew 1 each Daily.     • ondansetron ODT (ZOFRAN-ODT) 4 MG disintegrating tablet          No Known Allergies    Family History   Problem Relation Age of Onset   • Hypertension Father    • Cancer Maternal Grandmother    • Stroke Maternal Grandfather    • Diabetes Maternal Grandfather    • Migraines Maternal Grandfather    • Dementia Maternal Grandfather    • Stroke Maternal Aunt    • Diabetes Maternal Uncle    • Migraines Mother    • Arthritis Mother    • No Known Problems Brother    • No Known Problems Son    • No Known Problems Daughter    • No Known Problems Daughter    • Breast cancer Neg Hx    • Ovarian cancer Neg Hx    • Uterine cancer Neg Hx    • Colon cancer Neg Hx        Social History     Socioeconomic History   • Marital status:      Spouse name: OLEGARIO   • Number of children: 2   • Years of education: 12   Tobacco Use   • Smoking status: Former Smoker     Types: Cigarettes     Quit date: 2019     Years since quitting: 3.0   • Smokeless tobacco: Never Used   Vaping Use   • Vaping Use: Never used   Substance and Sexual Activity   • Alcohol use: No   • Drug use: No   • Sexual activity: Yes     Partners: Male     Birth control/protection: None     Comment: sigh other = OLEGARIO           Review of Systems   Constitutional: Negative for chills, fatigue and fever.   HENT: Negative.    Eyes: Negative for photophobia and visual disturbance.   Respiratory: Negative for cough, chest tightness and shortness of breath.    Cardiovascular: Negative for chest pain and leg swelling.   Gastrointestinal: Positive for abdominal pain ( Intermittent abdominal pain rated 3 out of 10 consistent with  contractions). Negative for diarrhea, nausea and  vomiting.   Genitourinary: Positive for vaginal bleeding ( Small amount of vaginal spotting following intercourse tonight). Negative for dysuria, flank pain, hematuria, pelvic pain and vaginal discharge.   Musculoskeletal: Negative for back pain.   Neurological: Negative for dizziness, seizures, weakness and headaches.          PHYSICAL EXAM:      VITAL SIGNS:  Vitals:    06/05/22 0345 06/05/22 0350 06/05/22 0355 06/05/22 0422   BP:    131/76   BP Location:       Patient Position:       Pulse: 92 95 93 94   Resp:       Temp:       TempSrc:       SpO2: 99% 100% 100%    Weight:       Height:            FHT'S:                   Baseline:  135 BPM  Variability:  Moderate = 6 - 25 BPM  Accelerations:  15 x 15 accelerations present     Decelerations:  absent  Contractions:   Mild irregular contractions present    Interpretation:   Reactive NST, CAT 1 tracing        PHYSICAL EXAM:    General: well developed; well nourished  no acute distress   Heart: Not performed.   Lungs: breathing is unlabored     Abdomen: soft, non-tender; no masses  no umbilical or inguinal hernias are present       Cervix: was examined by nurse, FFN not collected due to recent intercourse  Cervical Dilation (cm): 1  Cervical Effacement: 50%  Fetal Station: -3  Cervical Consistency: medium  Cervical Position: posterior   Contractions: irregular        Extremities: peripheral pulses normal, no pedal edema, no clubbing or cyanosis      LABS AND TESTING ORDERED:  1. Uterine and fetal monitoring  2. Urinalysis  3. CBC, CMP  4. Lactated Ringer's x1 L in view of contractions    LAB RESULTS:    Recent Results (from the past 24 hour(s))   Comprehensive Metabolic Panel    Collection Time: 06/05/22  4:15 AM    Specimen: Arm, Left; Blood   Result Value Ref Range    Glucose 87 65 - 99 mg/dL    BUN 10 6 - 20 mg/dL    Creatinine 0.47 (L) 0.57 - 1.00 mg/dL    Sodium 135 (L) 136 - 145 mmol/L    Potassium 4.0 3.5 - 5.2 mmol/L    Chloride 103 98 - 107 mmol/L    CO2  20.0 (L) 22.0 - 29.0 mmol/L    Calcium 9.5 8.6 - 10.5 mg/dL    Total Protein 7.1 6.0 - 8.5 g/dL    Albumin 3.90 3.50 - 5.20 g/dL    ALT (SGPT) 23 1 - 33 U/L    AST (SGOT) 15 1 - 32 U/L    Alkaline Phosphatase 114 39 - 117 U/L    Total Bilirubin 0.3 0.0 - 1.2 mg/dL    Globulin 3.2 gm/dL    A/G Ratio 1.2 g/dL    BUN/Creatinine Ratio 21.3 7.0 - 25.0    Anion Gap 12.0 5.0 - 15.0 mmol/L    eGFR 135.7 >60.0 mL/min/1.73   CBC Auto Differential    Collection Time: 06/05/22  4:15 AM    Specimen: Arm, Left; Blood   Result Value Ref Range    WBC 15.19 (H) 3.40 - 10.80 10*3/mm3    RBC 4.25 3.77 - 5.28 10*6/mm3    Hemoglobin 11.8 (L) 12.0 - 15.9 g/dL    Hematocrit 34.9 34.0 - 46.6 %    MCV 82.1 79.0 - 97.0 fL    MCH 27.8 26.6 - 33.0 pg    MCHC 33.8 31.5 - 35.7 g/dL    RDW 12.8 12.3 - 15.4 %    RDW-SD 38.1 37.0 - 54.0 fl    MPV 10.2 6.0 - 12.0 fL    Platelets 317 140 - 450 10*3/mm3    Neutrophil % 74.4 42.7 - 76.0 %    Lymphocyte % 14.8 (L) 19.6 - 45.3 %    Monocyte % 7.3 5.0 - 12.0 %    Eosinophil % 1.0 0.3 - 6.2 %    Basophil % 0.5 0.0 - 1.5 %    Immature Grans % 2.0 (H) 0.0 - 0.5 %    Neutrophils, Absolute 11.30 (H) 1.70 - 7.00 10*3/mm3    Lymphocytes, Absolute 2.25 0.70 - 3.10 10*3/mm3    Monocytes, Absolute 1.11 (H) 0.10 - 0.90 10*3/mm3    Eosinophils, Absolute 0.15 0.00 - 0.40 10*3/mm3    Basophils, Absolute 0.07 0.00 - 0.20 10*3/mm3    Immature Grans, Absolute 0.31 (H) 0.00 - 0.05 10*3/mm3    nRBC 0.2 0.0 - 0.2 /100 WBC   Urinalysis With Culture If Indicated - Urine, Clean Catch    Collection Time: 06/05/22  4:17 AM    Specimen: Urine, Clean Catch   Result Value Ref Range    Color, UA Yellow Yellow, Straw    Appearance, UA Clear Clear    pH, UA 5.5 5.0 - 8.0    Specific Gravity, UA 1.028 1.005 - 1.030    Glucose, UA Negative Negative    Ketones, UA Trace (A) Negative    Bilirubin, UA Negative Negative    Blood, UA Trace (A) Negative    Protein, UA Trace (A) Negative    Leuk Esterase, UA Trace (A) Negative    Nitrite, UA  Negative Negative    Urobilinogen, UA 0.2 E.U./dL 0.2 - 1.0 E.U./dL       Lab Results   Component Value Date    ABO O 2021    RH Positive 2021       Lab Results   Component Value Date    STREPGPB No Group B Streptococcus isolated 2021                 Assessment & Plan     ASSESSMENT/PLAN:  Rhina Marquez is a 27 y.o. female  at 33w5d who presented with  contractions and vaginal spotting following intercourse.  At the history of a delivery at 34 weeks 2 days in her last pregnancy and 34 weeks 6 days and her previous pregnancy.  Due to recent intercourse we were unable to obtain a fetal fibronectin but her cervix remains 1 cm dilated about 50% effaced presenting part -3.  She has a history of receiving steroids on May 25 down to  due to history of  delivery.  She was hydrated with 1 L of lactated Ringer's but continues to contract and rates her pain 4-5 out of 10, her contractions did space out a little bit but she states they are still as intense.  UA revealed trace ketones, trace blood, trace protein and leukocyte Estrace was trace, 1+ bacteriuria but no evidence for significant infection..  And specific gravity noted to be 1.028 consistent with mild dehydration.  CMP is largely unremarkable.  She is noted to have a slight leukocytosis with WBCs being 15.19 on no left shift, H&H is noted to be 11.8 and 34.9 with normal platelets of 317,000.  Case was discussed with Dr. Willam Hutchins MD who is covering and in view of continued contractions despite IV hydration and history of prior  delivery at 34 weeks decision was made to give her a dose of magnesium sulfate.  4 g loading dose then 2 g an hour.  In view of recent steroids no additional steroids will be given at this time.  Patient is being placed in observation to Dr. Willam Hutchins MD who will assume care of the patient at this time for further orders and management.          Final Impression:  • Pregnancy at  33w5d  •   • Reactive NST.  CAT 1 tracing  •  contractions at 33weeks 5 days, history of 2 prior 34-week deliveries 34 weeks 2 days and 34 weeks 6 days and her last 2 pregnancies respectively.  Continued contractions despite IV hydration, in view of this she will be admitted to observation and magnesium sulfate 4 g loading dose then 2 g an hour will be initiated.  Additional steroids will not be given at this time as she had recently received steroids on May 25 and May 26.  Dr. Willam Hutchins MD will be providing further orders and management.  Fetal fibronectin not collected due to recent intercourse.  Cervix remains 1 cm 50% presenting part -3 station.  • Essential hypertension patient will continue labetalol 200 mg p.o. twice daily, no evidence of preeclampsia at this time  • Maternal vital signs were reviewed and were unremarkable                Vitals:    22 0345 22 0350 22 0355 22 0422   BP:    131/76   BP Location:       Patient Position:       Pulse: 92 95 93 94   Resp:       Temp:       TempSrc:       SpO2: 99% 100% 100%    Weight:       Height:       •       Lab Results   Component Value Date    STREPGPB No Group B Streptococcus isolated 2021   •   Lab Results   Component Value Date    ABO O 2021    RH Positive 2021   •   • COVID - 19 status pending     PLAN:  · Patient will be placed in observation to Dr. Willam Hutchins MD secondary to  contractions at 33 weeks 5 days and history of 2 prior 34-week deliveries.  Dr. Willam Hutchins will assume care of the patient at this time and provide further orders and management.        I have spent 40 minutes including face to face time with the patient, greater than 50% in discussion of the diagnosis (counseling) and/or coordination of care.     Hill Murrell MD  2022  05:37 EDT  OB Hospitalist  Phone:    [phone]

## 2022-06-05 NOTE — H&P
[facility]   Obstetrics and Gynecology   History & Physical    2022    Patient: Rhina Marquez          MR#:[id number]    Chief complaint: Painful contractions and spotting    Subjective     27 y.o. female  at 33w5d with a history of  delivery who presents with complaint of painful contractions and spotting after intimacy.  Patient received IV hydration and IV terbutaline with little effect on her contractions that continued.  Cervical change was not observed on exam but with the continued painful contractions and history of  delivery the patient was started on magnesium.  The patient is resting this morning reporting contractions have abated.  She is received steroids previously.      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 Willam Calderon   • Depression     hx when younger   • Eye infection    • Gestational hypertension    • Mild preeclampsia    • Pre-eclampsia in postpartum period 2018   • Preeclampsia    • Rubella non-immune status, antepartum        Past Surgical History:    Procedure Laterality Date   • EAR TUBES         Obstetric History:  OB History        6    Para   4    Term   2       2    AB   1    Living   4       SAB   1    IAB   0    Ectopic   0    Molar   0    Multiple   0    Live Births   4               Menstrual History:     Patient's last menstrual period was 10/08/2021 (exact date).       # 1 - Date: , Sex: None, Weight: None, GA: None, Delivery: None, Apgar1: None, Apgar5: None, Living: None, Birth Comments: None    # 2 - Date: 11/05/15, Sex: Female, Weight: 2906 g (6 lb 6.5 oz), GA: 37w0d, Delivery: Vaginal, Spontaneous, Apgar1: 8, Apgar5: 9, Living: Living, Birth Comments: Delivery note reviewed.  No complications HB    # 3 - Date: 16, Sex: Female, Weight: 3756 g (8 lb 4.5 oz), GA: 37w0d, Delivery: Vaginal, Spontaneous, Apgar1: 9, Apgar5: 9, Living: Living, Birth Comments: delivery note reviewed - no [facility]     # 4 - Date: 18, Sex: Male, Weight: 3162 g (6 lb 15.5 oz), GA: 34w6d, Delivery: Vaginal, Spontaneous, Apgar1: 8, Apgar5: 9, Living: Living, Birth Comments: del note reviewed - no comps -[facility]      # 5 - Date: 21, Sex: Male, Weight: None, GA: 34w2d, Delivery: Vaginal, Spontaneous, Apgar1: None, Apgar5: None, Living: Living, Birth Comments: None    # 6 - Date: None, Sex: None, Weight: None, GA: None, Delivery: None, Apgar1: None, Apgar5: None, Living: None, Birth Comments: None      Prenatal Information:  Prenatal Results     POC Urine Glucose/Protein     Test Value Reference Range Date Time    Urine Glucose  Negative mg/dL Negative, 1000 mg/dL (3+) 22 1319    Urine Protein  Negative mg/dL Negative 22 1319          Initial Prenatal Labs     Test Value Reference Range Date Time    Hemoglobin  14.1 g/dL 12.0 - 15.9 21 2125       13.6 g/dL 11.1 - 15.9 21 1527    Hematocrit  41.7 % 34.0 - 46.6 21       41.2 % 34.0 - 46.6 21 152    Platelets  347 10*3/mm3 140 - 450 21        346 x10E3/uL 150 - 450 12/01/21 1527    Rubella IgG  2.12 index Immune >0.99 12/01/21 1527    Hepatitis B SAg  Negative  Negative 12/01/21 1527    Hepatitis C Ab  <0.1 s/co ratio 0.0 - 0.9 12/01/21 1527    RPR  Non Reactive  Non Reactive 12/01/21 1527    ABO  O   06/05/22 0549    Rh  Positive   06/05/22 0549    Antibody Screen  Negative   12/21/21 2125       Negative  Negative 12/01/21 1527    HIV  Non Reactive  Non Reactive 12/01/21 1527    Urine Culture  50,000 CFU/mL Mixed Lena Isolated   05/25/22 1856       Final report   12/01/21 1535    Gonorrhea  Negative  Negative 12/21/21 2257    Chlamydia  Negative  Negative 12/21/21 2257    TSH  1.850 uIU/mL 0.450 - 4.500 07/22/20 1420    HgB A1c   5.1 % 4.8 - 5.6 12/01/21 1527          2nd and 3rd Trimester     Test Value Reference Range Date Time    Hemoglobin (repeated)  11.8 g/dL 12.0 - 15.9 06/05/22 0415       11.5 g/dL 11.1 - 15.9 05/11/22 1352    Hematocrit (repeated)  34.9 % 34.0 - 46.6 06/05/22 0415       33.2 % 34.0 - 46.6 05/11/22 1352    Platelets   317 10*3/mm3 140 - 450 06/05/22 0415       321 x10E3/uL 150 - 450 05/11/22 1352       347 10*3/mm3 140 - 450 12/21/21 2125       346 x10E3/uL 150 - 450 12/01/21 1527    GCT  123 mg/dL 65 - 139 05/11/22 1352    Antibody Screen (repeated)  Negative   06/05/22 0549       Negative  Negative 05/11/22 1356    GTT Fasting        GTT 1 Hr ^ 83   10/07/16     GTT 2 Hr        GTT 3 Hr        Group B Strep              Drug Screening     Test Value Reference Range Date Time    Amphetamine Screen        Barbiturate Screen        Benzodiazepine Screen        Methadone Screen        Phencyclidine Screen        Opiates Screen        THC Screen        Cocaine Screen        Propoxyphene Screen        Buprenorphine Screen        Methamphetamine Screen        Oxycodone Screen        Tricyclic Antidepressants Screen              Other (Risk screening)     Test Value Reference Range Date Time    Varicella IgG  <135 index Immune >165  12/01/21 1527    Parvovirus IgG        CMV IgG        Cystic Fibrosis ^ negative   12/29/21     Hemoglobin electrophoresis        NIPT ^ negative xy    12/29/21     MSAFP-4        AFP (for NTD only)              Legend    ^: Historical                      External Prenatal Results     Pregnancy Outside Results - Transcribed From Office Records - See Scanned Records For Details     Test Value Date Time    ABO  O  06/05/22 0549    Rh  Positive  06/05/22 0549    Antibody Screen  Negative  06/05/22 0549       Negative  05/11/22 1356       Negative  12/21/21 2125       Negative  12/01/21 1527    Varicella IgG  <135 index 12/01/21 1527    Rubella  2.12 index 12/01/21 1527    Hgb  11.8 g/dL 06/05/22 0415       11.5 g/dL 05/11/22 1352       14.1 g/dL 12/21/21 2125       13.6 g/dL 12/01/21 1527    Hct  34.9 % 06/05/22 0415       33.2 % 05/11/22 1352       41.7 % 12/21/21 2125       41.2 % 12/01/21 1527    Glucose Fasting GTT       Glucose Tolerance Test 1 hour ^ 83  10/07/16     Glucose Tolerance Test 3 hour       Gonorrhea (discrete)  Negative  12/21/21 2257    Chlamydia (discrete)  Negative  12/21/21 2257    RPR  Non Reactive  12/01/21 1527    VDRL       Syphilis Antibody       HBsAg  Negative  12/01/21 1527    Herpes Simplex Virus PCR       Herpes Simplex VIrus Culture       HIV  Non Reactive  12/01/21 1527    Hep C RNA Quant PCR       Hep C Antibody  <0.1 s/co ratio 12/01/21 1527    AFP  38.5 ng/mL 05/29/18 1615    Group B Strep  No Group B Streptococcus isolated  01/21/21 1614    GBS Susceptibility to Clindamycin       GBS Susceptibility to Erythromycin       Fetal Fibronectin  Negative  05/25/22 2005    Genetic Testing, Maternal Blood             Drug Screening     Test Value Date Time    Urine Drug Screen       Amphetamine Screen       Barbiturate Screen       Benzodiazepine Screen       Methadone Screen       Phencyclidine Screen       Opiates Screen       THC Screen       Cocaine Screen       Propoxyphene Screen        Buprenorphine Screen       Methamphetamine Screen       Oxycodone Screen       Tricyclic Antidepressants Screen             Legend    ^: Historical                          Family History   Problem Relation Age of Onset   • Hypertension Father    • Cancer Maternal Grandmother    • Stroke Maternal Grandfather    • Diabetes Maternal Grandfather    • Migraines Maternal Grandfather    • Dementia Maternal Grandfather    • Stroke Maternal Aunt    • Diabetes Maternal Uncle    • Migraines Mother    • Arthritis Mother    • No Known Problems Brother    • No Known Problems Son    • No Known Problems Daughter    • No Known Problems Daughter    • Breast cancer Neg Hx    • Ovarian cancer Neg Hx    • Uterine cancer Neg Hx    • Colon cancer Neg Hx        Social History     Tobacco Use   • Smoking status: Former Smoker     Types: Cigarettes     Quit date: 05/2019     Years since quitting: 3.0   • Smokeless tobacco: Never Used   Vaping Use   • Vaping Use: Never used   Substance Use Topics   • Alcohol use: No   • Drug use: No       Patient has no known allergies.      Current Facility-Administered Medications:   •  labetalol (NORMODYNE) tablet 200 mg, 200 mg, Oral, Q12H, Hill Murrell MD, 200 mg at 06/05/22 1123  •  lactated ringers infusion, 75 mL/hr, Intravenous, Continuous, Hill Murrell MD, Last Rate: 75 mL/hr at 06/05/22 1125, 75 mL/hr at 06/05/22 1125  •  magnesium sulfate 20 GM/500ML infusion, 2 g/hr, Intravenous, Continuous, Hill Murrell MD, Last Rate: 50 mL/hr at 06/05/22 1123, 2 g/hr at 06/05/22 1123  •  sodium chloride 0.9 % flush 10 mL, 10 mL, Intravenous, Q8H, Hill Murrell MD  •  sodium chloride 0.9 % flush 10 mL, 10 mL, Intravenous, PRN, Hill Murrell MD    Review of Systems  Review of Systems   Constitutional: Negative.    Respiratory: Negative.    Cardiovascular: Negative.    Gastrointestinal:        Regular painful contractions   Genitourinary: Positive for vaginal  bleeding.   Psychiatric/Behavioral: Negative.        Objective     Vital Signs  Temp:  [97.5 °F (36.4 °C)-98.6 °F (37 °C)] 97.5 °F (36.4 °C)  Heart Rate:  [] 80  Resp:  [18] 18  BP: (100-134)/(51-98) 125/67    Physical Exam:  Physical Exam    Labs:  Results from last 7 days   Lab Units 22  0415   WBC 10*3/mm3 15.19*   HEMOGLOBIN g/dL 11.8*   HEMATOCRIT % 34.9   PLATELETS 10*3/mm3 317         Hospital problem list:    Pregnant      Assessment & Plan     1. Intrauterine pregnancy at 33w5d    2.  History of  delivery    3.   uterine contractions unresponsive to routine measures    Plan:  Magnesium was initiated for toco lysis  Betamethasone given ,     Reviewed procedure with patient.  I discussed the risks including but not limited to bleeding, infection and damage to internal organs.  Understanding of the procedure is voiced.     Carlo Sanderson MD  22  11:33 EDT      Patient Care Team:  Aneesh Coto MD as PCP - General  Aneesh Coto MD as PCP - Family Medicine

## 2022-06-05 NOTE — PLAN OF CARE
Goal Outcome Evaluation:  Plan of Care Reviewed With: patient, spouse        Progress: improving  Outcome Evaluation: Pt has had resolution of ctx.  Category I fetal tracing.  VSS.  Pt finished course of MgSO4 therapy as ordered by MD.  Plan to transfer to antepartum.

## 2022-06-05 NOTE — NON STRESS TEST
Rhina Marquez, a  at 33w5d with an MICHAEL of 2022, US (Date entered prior to episode creation), was seen at [facility] ANTEPARTUM UNIT for a nonstress test.    Chief Complaint   Patient presents with   • Contractions     MAYTE- pt reports having contactions start at 2330 and for 1 hour they have been 2-5 min apart rating 3/10. Scant amount of spotting noted following intercourse tonight. 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

## 2022-06-06 VITALS
WEIGHT: 224 LBS | TEMPERATURE: 98 F | OXYGEN SATURATION: 99 % | SYSTOLIC BLOOD PRESSURE: 124 MMHG | HEIGHT: 62 IN | RESPIRATION RATE: 18 BRPM | BODY MASS INDEX: 41.22 KG/M2 | HEART RATE: 103 BPM | DIASTOLIC BLOOD PRESSURE: 77 MMHG

## 2022-06-06 LAB — BACTERIA SPEC AEROBE CULT: NORMAL

## 2022-06-06 PROCEDURE — G0378 HOSPITAL OBSERVATION PER HR: HCPCS

## 2022-06-06 PROCEDURE — 99284 EMERGENCY DEPT VISIT MOD MDM: CPT | Performed by: OBSTETRICS & GYNECOLOGY

## 2022-06-06 PROCEDURE — 59025 FETAL NON-STRESS TEST: CPT | Performed by: OBSTETRICS & GYNECOLOGY

## 2022-06-06 PROCEDURE — 59025 FETAL NON-STRESS TEST: CPT | Performed by: STUDENT IN AN ORGANIZED HEALTH CARE EDUCATION/TRAINING PROGRAM

## 2022-06-06 PROCEDURE — 59025 FETAL NON-STRESS TEST: CPT

## 2022-06-06 RX ORDER — LABETALOL 200 MG/1
200 TABLET, FILM COATED ORAL EVERY 12 HOURS SCHEDULED
Qty: 30 TABLET | Refills: 0 | Status: SHIPPED | OUTPATIENT
Start: 2022-06-06 | End: 2022-06-12 | Stop reason: HOSPADM

## 2022-06-06 RX ADMIN — LABETALOL HYDROCHLORIDE 200 MG: 200 TABLET, FILM COATED ORAL at 11:33

## 2022-06-06 RX ADMIN — Medication 10 ML: at 09:08

## 2022-06-06 RX ADMIN — Medication 10 ML: at 03:33

## 2022-06-06 NOTE — NON STRESS TEST
Rhina Marquez, a  at 33w6d with an MICHAEL of 2022, US (Date entered prior to episode creation), was seen at [facility] ANTEPARTUM UNIT for a nonstress test.    Chief Complaint   Patient presents with   • Contractions     MAYTE- pt reports having contactions start at 2330 and for 1 hour they have been 2-5 min apart rating 3/10. Scant amount of spotting noted following intercourse tonight. 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

## 2022-06-06 NOTE — NON STRESS TEST
\    Rhina Marquez, a  at 33w6d with an MICHAEL of 2022, US (Date entered prior to episode creation), was seen at [facility] ANTEPARTUM UNIT for a nonstress test.    Chief Complaint   Patient presents with   • Contractions     MAYTE- pt reports having contactions start at 2330 and for 1 hour they have been 2-5 min apart rating 3/10. Scant amount of spotting noted following intercourse tonight. 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

## 2022-06-06 NOTE — PLAN OF CARE
Goal Outcome Evaluation:           Progress: improving  Outcome Evaluation: RNST, reported +FM, VSS. Pt. denies feeling any contractions and abdomen palpates soft. Pt denies any vaginal bleeding or leaking of fluid. pt. received 200mg labetolol around 2300 last night. possible discharge today.

## 2022-06-06 NOTE — DISCHARGE SUMMARY
Date of Discharge:  2022    Discharge Diagnosis: threatened  labor    Presenting Problem/History of Present Illness  Active Hospital Problems    Diagnosis  POA   • ** labor in third trimester without delivery [O60.03]  Yes   • Pregnant [Z34.90]  Not Applicable      Resolved Hospital Problems   No resolved problems to display.        Hospital Course  Patient is a 27 y.o. female at 33w6d with h/o  deliveries presented with painful, regular contractions.  She received IVF, terbutaline, and magnesium sulfate and was monitored >24 hours.  She had already received betamethasone .  Eventually contractions stopped and patient did not dilate.  She was not nikolas at all the morning of discharge and fetal monitoring was reassuring.      Consults:   Consults     No orders found for last 30 day(s).        Condition on Discharge:  stable    Vital Signs  Temp:  [97.3 °F (36.3 °C)-98.4 °F (36.9 °C)] 98 °F (36.7 °C)  Heart Rate:  [] 103  Resp:  [16-18] 18  BP: ()/(43-86) 124/77    Physical Exam:     General Appearance:    Alert, cooperative, in no acute distress   Head:    Normocephalic, without obvious abnormality, atraumatic   Eyes:            Lids and lashes normal, conjunctivae and sclerae normal, no   icterus, no pallor, corneas clear, PERRLA                   Lungs:     Clear to auscultation,respirations regular, even and                   unlabored    Heart:    Regular rhythm and normal rate   Breast Exam:    Deferred   Abdomen:     Normal bowel sounds, no masses, no organomegaly, soft        non-tender, non-distended, no guarding, no rebound                 tenderness   Genitalia:    Deferred   Extremities:   Moves all extremities well, no edema, no cyanosis, no              redness                       Discharge Disposition  Home or Self Care    Discharge Medications     Discharge Medications      Changes to Medications      Instructions Start Date   labetalol 200 MG  tablet  Commonly known as: NORMODYNE  What changed: when to take this   200 mg, Oral, Every 12 Hours Scheduled         Continue These Medications      Instructions Start Date   ondansetron ODT 4 MG disintegrating tablet  Commonly known as: ZOFRAN-ODT   No dose, route, or frequency recorded.      PRENATAL GUMMY VITAMIN   1 each, Oral, Daily             Discharge Diet:   Diet Instructions     Diet: Regular      Discharge Diet: Regular          Activity at Discharge:   Activity Instructions     Activity as Tolerated            Follow-up Appointments  Future Appointments   Date Time Provider Department Center   6/8/2022  2:00 PM [facility]   6/8/2022  2:15 PM Carlo Sanderson MD [facility]   6/16/2022  1:00 PM [facility]   6/16/2022  1:30 PM Sarah Moctezuma APRN [facility]   6/22/2022 10:00 AM [facility]   6/22/2022 10:45 AM Caroline Arreguin APRN [facility]   6/28/2022 11:00 AM [facility]   6/28/2022 11:30 AM Clarissa Munoz MD [facility]   7/6/2022 11:30 AM [facility]   7/6/2022 11:45 AM Carlo Sanderson MD [facility]   7/13/2022 12:30 PM [facility]   7/13/2022 12:45 PM Carlo Sanderson MD [facility]   7/20/2022 12:30 PM [facility]   7/20/2022 12:45 PM Carlo Sanderson MD [facility]     Additional Instructions for the Follow-ups that You Need to Schedule     Call MD With Problems / Concerns   As directed      Instructions: call or come to the hospital if you have persistent contractions, if your water may have broken (you have gush of fluid, persistent wetness, or trickling of fluid per vagina), if you have vaginal bleeding, decreased fetal movement    Order Comments: Instructions: call or come to the hospital if you have persistent contractions, if your water may have broken (you have  gush of fluid, persistent wetness, or trickling of fluid per vagina), if you have vaginal bleeding, decreased fetal movement          Discharge Follow-up with Specialty: Kin; 2 Days   As directed      Specialty: Kin    Follow Up: 2 Days               Test Results Pending at Discharge  Pending Labs     Order Current Status    Urine Culture - Urine, Urine, Clean Catch In process           Paola Montelongo MD  06/06/22  11:19 EDT    Time: Discharge 20 min

## 2022-06-06 NOTE — PLAN OF CARE
Problem: Adult Inpatient Plan of Care  Goal: Plan of Care Review  Outcome: Ongoing, Progressing  Flowsheets  Taken 6/6/2022 1145 by Denia Arrieta, RN  Outcome Evaluation: Patient being discharged home today.Discharge instructions discussed and signed with patient, all questions answered. Patient states understanding. Saline lock removed, pressure dressing applied. No new prescriptions sent home with patient at time of discharge.  Taken 6/6/2022 0601 by Hui Mccoy RN  Progress: improving  Taken 6/5/2022 1754 by Anali Louis RN  Plan of Care Reviewed With:   patient   spouse   Goal Outcome Evaluation:              Outcome Evaluation: Patient being discharged home today.Discharge instructions discussed and signed with patient, all questions answered. Patient states understanding. Saline lock removed, pressure dressing applied. No new prescriptions sent home with patient at time of discharge.

## 2022-06-08 ENCOUNTER — ROUTINE PRENATAL (OUTPATIENT)
Dept: OBSTETRICS AND GYNECOLOGY | Age: 27
End: 2022-06-08

## 2022-06-08 VITALS — BODY MASS INDEX: 41.7 KG/M2 | DIASTOLIC BLOOD PRESSURE: 72 MMHG | SYSTOLIC BLOOD PRESSURE: 122 MMHG | WEIGHT: 228 LBS

## 2022-06-08 DIAGNOSIS — O60.03 PRETERM LABOR IN THIRD TRIMESTER WITHOUT DELIVERY: ICD-10-CM

## 2022-06-08 DIAGNOSIS — Z91.199 COMPLIANCE POOR: ICD-10-CM

## 2022-06-08 DIAGNOSIS — O10.919 CHRONIC HYPERTENSION AFFECTING PREGNANCY: ICD-10-CM

## 2022-06-08 DIAGNOSIS — O09.219 PREVIOUS PRETERM DELIVERY, ANTEPARTUM: ICD-10-CM

## 2022-06-08 DIAGNOSIS — O99.210 OBESITY IN PREGNANCY, ANTEPARTUM: ICD-10-CM

## 2022-06-08 DIAGNOSIS — Z3A.34 34 WEEKS GESTATION OF PREGNANCY: Primary | ICD-10-CM

## 2022-06-08 DIAGNOSIS — O09.90 SUPERVISION OF HIGH RISK PREGNANCY, ANTEPARTUM: ICD-10-CM

## 2022-06-08 PROBLEM — Z34.90 PREGNANCY: Status: RESOLVED | Noted: 2021-01-21 | Resolved: 2022-06-08

## 2022-06-08 PROCEDURE — 99214 OFFICE O/P EST MOD 30 MIN: CPT | Performed by: OBSTETRICS & GYNECOLOGY

## 2022-06-08 NOTE — PROGRESS NOTES
Chief Complaint   Patient presents with   • Routine Prenatal Visit     BPP; pt c/o strong contractions yesterday and feeling very crampy today       HPI: 27 y.o.  at 34w1d presents reporting only mild cramping currently but states periodic runs of contractions.  She states she is getting ready to leave the house to go to the hospital.  Patient was admitted 2 days ago for  labor and received magnesium for toco lysis.  Patient's received steroids and has a history of  delivery.    Pregnancy is complicated by chronic hypertension with stable blood pressure and no symptoms of preeclampsia.  Serial growth is normal on the fetus.    Vitals:    22 1420   BP: 122/72   Weight: 103 kg (228 lb)     Total weight gain for pregnancy:  -0.907 kg (-2 lb)    Review of systems:   Gen: negative  CV:     negative  GI: negative  :   negative  MS:    negative  Neuro: negative and denies headaches and visual changes   Pul: negative    A/P  1. Intrauterine pregnancy at 34w1d   2. Pregnancy Risk:  HIGH RISK        Diagnoses and all orders for this visit:    1. 34 weeks gestation of pregnancy (Primary)    2. Supervision of high risk pregnancy, antepartum    3. Chronic hypertension affecting pregnancy    4. Compliance poor    5. Obesity in pregnancy, antepartum    6.  labor in third trimester without delivery    7. Previous  delivery, antepartum        Pre-eclampsia symptoms were discussed and warnings were given.   labor was discussed.  Warnings were provided.  -----------------------  PLAN:   Return in about 1 week (around 6/15/2022) for ob check.      Carlo Sanderson MD  2022 14:36 EDT

## 2022-06-10 ENCOUNTER — ANESTHESIA EVENT (OUTPATIENT)
Dept: LABOR AND DELIVERY | Facility: HOSPITAL | Age: 27
End: 2022-06-10

## 2022-06-10 ENCOUNTER — HOSPITAL ENCOUNTER (INPATIENT)
Facility: HOSPITAL | Age: 27
LOS: 2 days | Discharge: HOME | End: 2022-06-12
Attending: OBSTETRICS & GYNECOLOGY | Admitting: OBSTETRICS & GYNECOLOGY

## 2022-06-10 ENCOUNTER — ANESTHESIA (OUTPATIENT)
Dept: LABOR AND DELIVERY | Facility: HOSPITAL | Age: 27
End: 2022-06-10

## 2022-06-10 PROBLEM — Z34.90 PREGNANCY: Status: ACTIVE | Noted: 2022-06-10

## 2022-06-10 LAB
ABO GROUP BLD: NORMAL
ALBUMIN SERPL-MCNC: 3.7 G/DL (ref 3.5–5.2)
ALBUMIN/GLOB SERPL: 1.3 G/DL
ALP SERPL-CCNC: 110 U/L (ref 39–117)
ALT SERPL W P-5'-P-CCNC: 23 U/L (ref 1–33)
ANION GAP SERPL CALCULATED.3IONS-SCNC: 10 MMOL/L (ref 5–15)
AST SERPL-CCNC: 13 U/L (ref 1–32)
BACTERIA UR QL AUTO: NORMAL /HPF
BILIRUB SERPL-MCNC: 0.3 MG/DL (ref 0–1.2)
BILIRUB UR QL STRIP: NEGATIVE
BLD GP AB SCN SERPL QL: NEGATIVE
BUN SERPL-MCNC: 9 MG/DL (ref 6–20)
BUN/CREAT SERPL: 20.9 (ref 7–25)
CALCIUM SPEC-SCNC: 9 MG/DL (ref 8.6–10.5)
CHLORIDE SERPL-SCNC: 104 MMOL/L (ref 98–107)
CLARITY UR: CLEAR
CO2 SERPL-SCNC: 21 MMOL/L (ref 22–29)
COLOR UR: YELLOW
CREAT SERPL-MCNC: 0.43 MG/DL (ref 0.57–1)
CREAT UR-MCNC: 115.4 MG/DL
DEPRECATED RDW RBC AUTO: 38.5 FL (ref 37–54)
EGFRCR SERPLBLD CKD-EPI 2021: 138.6 ML/MIN/1.73
ERYTHROCYTE [DISTWIDTH] IN BLOOD BY AUTOMATED COUNT: 13.2 % (ref 12.3–15.4)
GLOBULIN UR ELPH-MCNC: 2.9 GM/DL
GLUCOSE SERPL-MCNC: 84 MG/DL (ref 65–99)
GLUCOSE UR STRIP-MCNC: NEGATIVE MG/DL
HCT VFR BLD AUTO: 34.3 % (ref 34–46.6)
HGB BLD-MCNC: 11.5 G/DL (ref 12–15.9)
HGB UR QL STRIP.AUTO: ABNORMAL
HYALINE CASTS UR QL AUTO: NORMAL /LPF
KETONES UR QL STRIP: ABNORMAL
LEUKOCYTE ESTERASE UR QL STRIP.AUTO: NEGATIVE
MCH RBC QN AUTO: 27.6 PG (ref 26.6–33)
MCHC RBC AUTO-ENTMCNC: 33.5 G/DL (ref 31.5–35.7)
MCV RBC AUTO: 82.3 FL (ref 79–97)
NITRITE UR QL STRIP: NEGATIVE
PH UR STRIP.AUTO: 6 [PH] (ref 5–8)
PLATELET # BLD AUTO: 283 10*3/MM3 (ref 140–450)
PMV BLD AUTO: 10.2 FL (ref 6–12)
POTASSIUM SERPL-SCNC: 3.9 MMOL/L (ref 3.5–5.2)
PROT ?TM UR-MCNC: 15.2 MG/DL
PROT SERPL-MCNC: 6.6 G/DL (ref 6–8.5)
PROT UR QL STRIP: NEGATIVE
PROT/CREAT UR: 131.7 MG/G CREA (ref 0–200)
RBC # BLD AUTO: 4.17 10*6/MM3 (ref 3.77–5.28)
RBC # UR STRIP: NORMAL /HPF
REF LAB TEST METHOD: NORMAL
RH BLD: POSITIVE
SARS-COV-2 RNA PNL SPEC NAA+PROBE: NOT DETECTED
SODIUM SERPL-SCNC: 135 MMOL/L (ref 136–145)
SP GR UR STRIP: 1.02 (ref 1–1.03)
SQUAMOUS #/AREA URNS HPF: NORMAL /HPF
T&S EXPIRATION DATE: NORMAL
UROBILINOGEN UR QL STRIP: ABNORMAL
WBC # UR STRIP: NORMAL /HPF
WBC NRBC COR # BLD: 12.7 10*3/MM3 (ref 3.4–10.8)

## 2022-06-10 PROCEDURE — C1755 CATHETER, INTRASPINAL: HCPCS

## 2022-06-10 PROCEDURE — 85027 COMPLETE CBC AUTOMATED: CPT | Performed by: OBSTETRICS & GYNECOLOGY

## 2022-06-10 PROCEDURE — 88307 TISSUE EXAM BY PATHOLOGIST: CPT

## 2022-06-10 PROCEDURE — 25010000002 ROPIVACAINE PER 1 MG: Performed by: ANESTHESIOLOGY

## 2022-06-10 PROCEDURE — 99202 OFFICE O/P NEW SF 15 MIN: CPT | Performed by: OBSTETRICS & GYNECOLOGY

## 2022-06-10 PROCEDURE — S0260 H&P FOR SURGERY: HCPCS | Performed by: OBSTETRICS & GYNECOLOGY

## 2022-06-10 PROCEDURE — 84156 ASSAY OF PROTEIN URINE: CPT | Performed by: OBSTETRICS & GYNECOLOGY

## 2022-06-10 PROCEDURE — 80053 COMPREHEN METABOLIC PANEL: CPT | Performed by: OBSTETRICS & GYNECOLOGY

## 2022-06-10 PROCEDURE — 81001 URINALYSIS AUTO W/SCOPE: CPT | Performed by: OBSTETRICS & GYNECOLOGY

## 2022-06-10 PROCEDURE — 86900 BLOOD TYPING SEROLOGIC ABO: CPT | Performed by: OBSTETRICS & GYNECOLOGY

## 2022-06-10 PROCEDURE — 86850 RBC ANTIBODY SCREEN: CPT | Performed by: OBSTETRICS & GYNECOLOGY

## 2022-06-10 PROCEDURE — 87635 SARS-COV-2 COVID-19 AMP PRB: CPT | Performed by: OBSTETRICS & GYNECOLOGY

## 2022-06-10 PROCEDURE — 86901 BLOOD TYPING SEROLOGIC RH(D): CPT | Performed by: OBSTETRICS & GYNECOLOGY

## 2022-06-10 PROCEDURE — 59410 OBSTETRICAL CARE: CPT | Performed by: OBSTETRICS & GYNECOLOGY

## 2022-06-10 PROCEDURE — 25010000002 PENICILLIN G POTASSIUM PER 600000 UNITS: Performed by: OBSTETRICS & GYNECOLOGY

## 2022-06-10 PROCEDURE — 82570 ASSAY OF URINE CREATININE: CPT | Performed by: OBSTETRICS & GYNECOLOGY

## 2022-06-10 PROCEDURE — C1755 CATHETER, INTRASPINAL: HCPCS | Performed by: ANESTHESIOLOGY

## 2022-06-10 RX ORDER — ROPIVACAINE HYDROCHLORIDE 2 MG/ML
INJECTION, SOLUTION EPIDURAL; INFILTRATION; PERINEURAL AS NEEDED
Status: DISCONTINUED | OUTPATIENT
Start: 2022-06-10 | End: 2022-06-10 | Stop reason: SURG

## 2022-06-10 RX ORDER — OXYTOCIN/0.9 % SODIUM CHLORIDE 30/500 ML
125 PLASTIC BAG, INJECTION (ML) INTRAVENOUS CONTINUOUS PRN
Status: COMPLETED | OUTPATIENT
Start: 2022-06-10 | End: 2022-06-10

## 2022-06-10 RX ORDER — SODIUM CHLORIDE 0.9 % (FLUSH) 0.9 %
10 SYRINGE (ML) INJECTION EVERY 12 HOURS SCHEDULED
Status: DISCONTINUED | OUTPATIENT
Start: 2022-06-10 | End: 2022-06-10 | Stop reason: HOSPADM

## 2022-06-10 RX ORDER — DIPHENHYDRAMINE HYDROCHLORIDE 50 MG/ML
12.5 INJECTION INTRAMUSCULAR; INTRAVENOUS EVERY 8 HOURS PRN
Status: DISCONTINUED | OUTPATIENT
Start: 2022-06-10 | End: 2022-06-10 | Stop reason: HOSPADM

## 2022-06-10 RX ORDER — OXYTOCIN/0.9 % SODIUM CHLORIDE 30/500 ML
250 PLASTIC BAG, INJECTION (ML) INTRAVENOUS CONTINUOUS PRN
Status: ACTIVE | OUTPATIENT
Start: 2022-06-10 | End: 2022-06-10

## 2022-06-10 RX ORDER — SODIUM CHLORIDE, SODIUM LACTATE, POTASSIUM CHLORIDE, CALCIUM CHLORIDE 600; 310; 30; 20 MG/100ML; MG/100ML; MG/100ML; MG/100ML
125 INJECTION, SOLUTION INTRAVENOUS CONTINUOUS
Status: DISCONTINUED | OUTPATIENT
Start: 2022-06-10 | End: 2022-06-12 | Stop reason: HOSPADM

## 2022-06-10 RX ORDER — ONDANSETRON 2 MG/ML
4 INJECTION INTRAMUSCULAR; INTRAVENOUS ONCE AS NEEDED
Status: DISCONTINUED | OUTPATIENT
Start: 2022-06-10 | End: 2022-06-10 | Stop reason: HOSPADM

## 2022-06-10 RX ORDER — TEMAZEPAM 7.5 MG/1
7.5 CAPSULE ORAL NIGHTLY PRN
Status: DISCONTINUED | OUTPATIENT
Start: 2022-06-10 | End: 2022-06-12 | Stop reason: HOSPADM

## 2022-06-10 RX ORDER — METOCLOPRAMIDE 10 MG/1
10 TABLET ORAL ONCE
Status: DISCONTINUED | OUTPATIENT
Start: 2022-06-10 | End: 2022-06-10

## 2022-06-10 RX ORDER — SODIUM CHLORIDE 0.9 % (FLUSH) 0.9 %
10 SYRINGE (ML) INJECTION AS NEEDED
Status: DISCONTINUED | OUTPATIENT
Start: 2022-06-10 | End: 2022-06-10 | Stop reason: HOSPADM

## 2022-06-10 RX ORDER — MISOPROSTOL 200 UG/1
800 TABLET ORAL AS NEEDED
Status: DISCONTINUED | OUTPATIENT
Start: 2022-06-10 | End: 2022-06-12 | Stop reason: HOSPADM

## 2022-06-10 RX ORDER — OXYTOCIN/0.9 % SODIUM CHLORIDE 30/500 ML
999 PLASTIC BAG, INJECTION (ML) INTRAVENOUS ONCE
Status: COMPLETED | OUTPATIENT
Start: 2022-06-10 | End: 2022-06-10

## 2022-06-10 RX ORDER — LABETALOL 200 MG/1
200 TABLET, FILM COATED ORAL EVERY 12 HOURS SCHEDULED
Status: DISCONTINUED | OUTPATIENT
Start: 2022-06-10 | End: 2022-06-11

## 2022-06-10 RX ORDER — OXYCODONE AND ACETAMINOPHEN 7.5; 325 MG/1; MG/1
1 TABLET ORAL EVERY 4 HOURS PRN
Status: DISCONTINUED | OUTPATIENT
Start: 2022-06-10 | End: 2022-06-12 | Stop reason: HOSPADM

## 2022-06-10 RX ORDER — PENICILLIN G 3000000 [IU]/50ML
3 INJECTION, SOLUTION INTRAVENOUS EVERY 4 HOURS
Status: DISCONTINUED | OUTPATIENT
Start: 2022-06-10 | End: 2022-06-10 | Stop reason: HOSPADM

## 2022-06-10 RX ORDER — METHYLERGONOVINE MALEATE 0.2 MG/ML
200 INJECTION INTRAVENOUS ONCE AS NEEDED
Status: DISCONTINUED | OUTPATIENT
Start: 2022-06-10 | End: 2022-06-10 | Stop reason: HOSPADM

## 2022-06-10 RX ORDER — LIDOCAINE HYDROCHLORIDE AND EPINEPHRINE 15; 5 MG/ML; UG/ML
INJECTION, SOLUTION EPIDURAL AS NEEDED
Status: DISCONTINUED | OUTPATIENT
Start: 2022-06-10 | End: 2022-06-10 | Stop reason: SURG

## 2022-06-10 RX ORDER — ONDANSETRON 2 MG/ML
4 INJECTION INTRAMUSCULAR; INTRAVENOUS EVERY 6 HOURS PRN
Status: DISCONTINUED | OUTPATIENT
Start: 2022-06-10 | End: 2022-06-12 | Stop reason: HOSPADM

## 2022-06-10 RX ORDER — NALOXONE HCL 0.4 MG/ML
0.1 VIAL (ML) INJECTION
Status: DISCONTINUED | OUTPATIENT
Start: 2022-06-10 | End: 2022-06-12 | Stop reason: HOSPADM

## 2022-06-10 RX ORDER — OXYTOCIN/0.9 % SODIUM CHLORIDE 30/500 ML
125 PLASTIC BAG, INJECTION (ML) INTRAVENOUS CONTINUOUS PRN
Status: DISCONTINUED | OUTPATIENT
Start: 2022-06-10 | End: 2022-06-12 | Stop reason: HOSPADM

## 2022-06-10 RX ORDER — NALOXONE HCL 0.4 MG/ML
0.4 VIAL (ML) INJECTION
Status: DISCONTINUED | OUTPATIENT
Start: 2022-06-10 | End: 2022-06-12 | Stop reason: HOSPADM

## 2022-06-10 RX ORDER — MORPHINE SULFATE 2 MG/ML
1 INJECTION, SOLUTION INTRAMUSCULAR; INTRAVENOUS EVERY 4 HOURS PRN
Status: DISCONTINUED | OUTPATIENT
Start: 2022-06-10 | End: 2022-06-12 | Stop reason: HOSPADM

## 2022-06-10 RX ORDER — ERYTHROMYCIN 5 MG/G
OINTMENT OPHTHALMIC
Status: ACTIVE
Start: 2022-06-10 | End: 2022-06-10

## 2022-06-10 RX ORDER — HYDROCORTISONE 25 MG/G
1 CREAM TOPICAL AS NEEDED
Status: DISCONTINUED | OUTPATIENT
Start: 2022-06-10 | End: 2022-06-12 | Stop reason: HOSPADM

## 2022-06-10 RX ORDER — ACETAMINOPHEN 325 MG/1
650 TABLET ORAL EVERY 4 HOURS PRN
Status: DISCONTINUED | OUTPATIENT
Start: 2022-06-10 | End: 2022-06-12 | Stop reason: HOSPADM

## 2022-06-10 RX ORDER — EPHEDRINE SULFATE 50 MG/ML
5 INJECTION, SOLUTION INTRAVENOUS
Status: DISCONTINUED | OUTPATIENT
Start: 2022-06-10 | End: 2022-06-10 | Stop reason: HOSPADM

## 2022-06-10 RX ORDER — MISOPROSTOL 200 UG/1
800 TABLET ORAL ONCE AS NEEDED
Status: DISCONTINUED | OUTPATIENT
Start: 2022-06-10 | End: 2022-06-10 | Stop reason: HOSPADM

## 2022-06-10 RX ORDER — BISACODYL 10 MG
10 SUPPOSITORY, RECTAL RECTAL DAILY PRN
Status: DISCONTINUED | OUTPATIENT
Start: 2022-06-11 | End: 2022-06-12 | Stop reason: HOSPADM

## 2022-06-10 RX ORDER — ONDANSETRON 4 MG/1
4 TABLET, FILM COATED ORAL EVERY 6 HOURS PRN
Status: DISCONTINUED | OUTPATIENT
Start: 2022-06-10 | End: 2022-06-12 | Stop reason: HOSPADM

## 2022-06-10 RX ORDER — MAGNESIUM CARB/ALUMINUM HYDROX 105-160MG
30 TABLET,CHEWABLE ORAL ONCE
Status: DISCONTINUED | OUTPATIENT
Start: 2022-06-10 | End: 2022-06-10 | Stop reason: HOSPADM

## 2022-06-10 RX ORDER — IBUPROFEN 600 MG/1
600 TABLET ORAL EVERY 6 HOURS SCHEDULED
Status: DISCONTINUED | OUTPATIENT
Start: 2022-06-10 | End: 2022-06-12 | Stop reason: HOSPADM

## 2022-06-10 RX ORDER — PROMETHAZINE HYDROCHLORIDE 12.5 MG/1
12.5 TABLET ORAL EVERY 4 HOURS PRN
Status: DISCONTINUED | OUTPATIENT
Start: 2022-06-10 | End: 2022-06-12 | Stop reason: HOSPADM

## 2022-06-10 RX ORDER — FAMOTIDINE 10 MG/ML
20 INJECTION, SOLUTION INTRAVENOUS ONCE AS NEEDED
Status: DISCONTINUED | OUTPATIENT
Start: 2022-06-10 | End: 2022-06-10 | Stop reason: HOSPADM

## 2022-06-10 RX ORDER — DOCUSATE SODIUM 100 MG/1
100 CAPSULE, LIQUID FILLED ORAL 2 TIMES DAILY
Status: DISCONTINUED | OUTPATIENT
Start: 2022-06-10 | End: 2022-06-12 | Stop reason: HOSPADM

## 2022-06-10 RX ORDER — CARBOPROST TROMETHAMINE 250 UG/ML
250 INJECTION, SOLUTION INTRAMUSCULAR
Status: DISCONTINUED | OUTPATIENT
Start: 2022-06-10 | End: 2022-06-10 | Stop reason: HOSPADM

## 2022-06-10 RX ORDER — OXYCODONE HYDROCHLORIDE AND ACETAMINOPHEN 5; 325 MG/1; MG/1
1 TABLET ORAL EVERY 4 HOURS PRN
Status: DISCONTINUED | OUTPATIENT
Start: 2022-06-10 | End: 2022-06-12 | Stop reason: HOSPADM

## 2022-06-10 RX ORDER — PHYTONADIONE 1 MG/.5ML
INJECTION, EMULSION INTRAMUSCULAR; INTRAVENOUS; SUBCUTANEOUS
Status: ACTIVE
Start: 2022-06-10 | End: 2022-06-10

## 2022-06-10 RX ORDER — SODIUM CHLORIDE 0.9 % (FLUSH) 0.9 %
1-10 SYRINGE (ML) INJECTION AS NEEDED
Status: DISCONTINUED | OUTPATIENT
Start: 2022-06-10 | End: 2022-06-12 | Stop reason: HOSPADM

## 2022-06-10 RX ORDER — SODIUM CHLORIDE, SODIUM LACTATE, POTASSIUM CHLORIDE, CALCIUM CHLORIDE 600; 310; 30; 20 MG/100ML; MG/100ML; MG/100ML; MG/100ML
125 INJECTION, SOLUTION INTRAVENOUS CONTINUOUS
Status: DISCONTINUED | OUTPATIENT
Start: 2022-06-10 | End: 2022-06-10 | Stop reason: SDUPTHER

## 2022-06-10 RX ORDER — FENTANYL CIT 0.2 MG/100ML-ROPIV 0.2%-NACL 0.9% EPIDURAL INJ 2/0.2 MCG/ML-%
10 SOLUTION INJECTION CONTINUOUS
Status: DISCONTINUED | OUTPATIENT
Start: 2022-06-10 | End: 2022-06-10

## 2022-06-10 RX ADMIN — SODIUM CHLORIDE, POTASSIUM CHLORIDE, SODIUM LACTATE AND CALCIUM CHLORIDE 1000 ML: 600; 310; 30; 20 INJECTION, SOLUTION INTRAVENOUS at 05:03

## 2022-06-10 RX ADMIN — ROPIVACAINE HYDROCHLORIDE 4 MG: 2 INJECTION, SOLUTION EPIDURAL; INFILTRATION at 05:44

## 2022-06-10 RX ADMIN — ROPIVACAINE HYDROCHLORIDE 4 MG: 2 INJECTION, SOLUTION EPIDURAL; INFILTRATION at 05:39

## 2022-06-10 RX ADMIN — LIDOCAINE HYDROCHLORIDE AND EPINEPHRINE 2.5 ML: 15; 5 INJECTION, SOLUTION EPIDURAL at 05:36

## 2022-06-10 RX ADMIN — LABETALOL HYDROCHLORIDE 200 MG: 200 TABLET, FILM COATED ORAL at 22:00

## 2022-06-10 RX ADMIN — Medication 999 ML/HR: at 06:47

## 2022-06-10 RX ADMIN — SODIUM CHLORIDE, POTASSIUM CHLORIDE, SODIUM LACTATE AND CALCIUM CHLORIDE 125 ML/HR: 600; 310; 30; 20 INJECTION, SOLUTION INTRAVENOUS at 06:00

## 2022-06-10 RX ADMIN — Medication 125 ML/HR: at 08:12

## 2022-06-10 RX ADMIN — DOCUSATE SODIUM 100 MG: 100 CAPSULE, LIQUID FILLED ORAL at 21:59

## 2022-06-10 RX ADMIN — OXYCODONE AND ACETAMINOPHEN 1 TABLET: 5; 325 TABLET ORAL at 19:20

## 2022-06-10 RX ADMIN — Medication 8 ML/HR: at 05:44

## 2022-06-10 RX ADMIN — IBUPROFEN 600 MG: 600 TABLET, FILM COATED ORAL at 16:06

## 2022-06-10 RX ADMIN — IBUPROFEN 600 MG: 600 TABLET, FILM COATED ORAL at 08:49

## 2022-06-10 RX ADMIN — PENICILLIN G POTASSIUM 5 MILLION UNITS: 5000000 INJECTION, POWDER, FOR SOLUTION INTRAMUSCULAR; INTRAVENOUS at 05:04

## 2022-06-10 RX ADMIN — IBUPROFEN 600 MG: 600 TABLET, FILM COATED ORAL at 21:59

## 2022-06-10 NOTE — OBED NOTES
MAYTE Note OB    Patient Name: Rhina Marquez  YOB: 1995  MRN: [id number]  Admission Date: 6/10/2022  4:07 AM  Date of Service: 6/10/2022    Chief Complaint: Contractions (Pt comes into the mayte c/o of ctx that started around 0215 this am. Pt states they aare coming every few mins. + fm noted pt denies any lof has had some spotting but had a ve on wed. Pt was sent home Monday after being here on mag for 8 hrs )        Subjective     Rhina Marquez is a 27 y.o. female  at 34w3d with Estimated Date of Delivery: 22 who presents with the chief complaint listed above. P:t has had 2 prior PTD at 34 weeks and was 2 cms last check. Pt had Steroids on May 25,26. She also has CHTN and is taking labetelol 200 mg BID and has had delivery at 37 weeks due to this.     She sees Carlo Sanderson MD for her prenatal care. Her pregnancy has been complicated by: obesity, Varicella NI, vanishing twin first trimester, CHTN, intractable chronic paroxsymal hemicrania, carrier of fragile X, mutation of FMR1 gene , hx of poor compliance    She describes fetal movement as normal.  She denies rupture of membranes.  She denies vaginal bleeding. She is feeling contractions.        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 [Z15.89]    • Vanishing twin syndrome [O31.10X0]    • History of varicella vaccination [Z92.29]    • Supervision of high risk pregnancy, antepartum [O09.90]    • Carrier of fragile X syndrome [Z14.8]    • Obesity in pregnancy, antepartum [O99.210]    • Previous  delivery, antepartum [O09.219]    • Susceptible to varicella (non-immune), currently pregnant [O09.899, Z28.39]    • Pituitary gland  enlarged (HCC) [E23.6]    • Chronic hypertension affecting pregnancy [O10.919]    • Chronic headaches [R51.9, G89.29]    • Hx of preeclampsia, prior pregnancy, currently pregnant [O09.299]    • Generalized convulsive seizures (HCC) [R56.9]         OB History    Para Term  AB Living   10 8 4 4 1 8   SAB IAB Ectopic Molar Multiple Live Births   1 0 0 0 0 8      # Outcome Date GA Lbr Avinash/2nd Weight Sex Delivery Anes PTL Lv   10 Current            9  21 34w2d   M Vag-Spont  Y JOAN   8  18 34w6d 03:03 / 00:16 3162 g (6 lb 15.5 oz) M Vag-Spont EPI Y JOAN      Birth Comments: del note reviewed - no comps -F        Complications: Preeclampsia in postpartum period      Name: Yandel       Apgar1: 8  Apgar5: 9   7 Term 16 37w0d 03:02 / 00:13 3756 g (8 lb 4.5 oz) F Vag-Spont EPI N JOAN      Birth Comments: delivery note reviewed - no comps - JHF       Complications: Preeclampsia      Name: Siri       Apgar1: 9  Apgar5: 9   6 Term 11/05/15 37w0d  2906 g (6 lb 6.5 oz) F Vag-Spont   JOAN      Birth Comments: Delivery note reviewed.  No complications HB      Complications: Preeclampsia      Name: CINTHIA      Apgar1: 8  Apgar5: 9   5 SAB            4       Vag-Spont  Y JOAN   3       Vag-Spont  Y JOAN   2 Term      Vag-Spont   JOAN   1 Term      Vag-Spont   JOAN        Past Medical History:   Diagnosis Date   • Anxiety    • Asthma    • Depression     hx when younger   • Eye infection    • Gestational hypertension    • Maternal varicella, non-immune    • Mild preeclampsia    • Pre-eclampsia in postpartum period 2018   • Preeclampsia    • Psychogenic nonepileptic seizure     Neurologic workup to date is inconclusive for seizure disorder, pt did not follow through with EEG, saw neurologist Dr. Willam Calderon       Past Surgical History:   Procedure Laterality Date   • EAR TUBES         No current facility-administered medications on file prior to encounter.      Current Outpatient Medications on File Prior to Encounter   Medication Sig Dispense Refill   • labetalol (NORMODYNE) 200 MG tablet Take 1 tablet by mouth Every 12 (Twelve) Hours. 30 tablet 0   • PRENATAL GUMMY VITAMIN Chew 1 each Daily.     • ondansetron ODT (ZOFRAN-ODT) 4 MG disintegrating tablet          No Known Allergies    Family History   Problem Relation Age of Onset   • Hypertension Father    • Cancer Maternal Grandmother    • Stroke Maternal Grandfather    • Diabetes Maternal Grandfather    • Migraines Maternal Grandfather    • Dementia Maternal Grandfather    • Stroke Maternal Aunt    • Diabetes Maternal Uncle    • Migraines Mother    • Arthritis Mother    • No Known Problems Brother    • No Known Problems Son    • No Known Problems Daughter    • No Known Problems Daughter    • Breast cancer Neg Hx    • Ovarian cancer Neg Hx    • Uterine cancer Neg Hx    • Colon cancer Neg Hx        Social History     Socioeconomic History   • Marital status:      Spouse name: OLEGARIO   • Number of children: 2   • Years of education: 12   Tobacco Use   • Smoking status: Former Smoker     Types: Cigarettes     Quit date: 05/2019     Years since quitting: 3.1   • Smokeless tobacco: Never Used   Vaping Use   • Vaping Use: Never used   Substance and Sexual Activity   • Alcohol use: No   • Drug use: No   • Sexual activity: Yes     Partners: Male     Birth control/protection: None     Comment: sigh other = OLEGARIO           Review of Systems   Constitutional: Negative for chills and fever.   HENT: Negative.    Eyes: Negative for photophobia and visual disturbance.   Respiratory: Negative for shortness of breath.    Cardiovascular: Negative for chest pain.   Gastrointestinal: Positive for abdominal pain. Negative for nausea.   Psychiatric/Behavioral: The patient is not nervous/anxious.           PHYSICAL EXAM:      VITAL SIGNS:  Vitals:    06/10/22 0411 06/10/22 0422   BP:  146/83   BP Location:  Right arm   Patient  "Position:  Sitting   Pulse:  92   Resp:  18   Temp:  97.6 °F (36.4 °C)   TempSrc:  Oral   SpO2:  98%   Weight: 103 kg (226 lb 12.8 oz) 103 kg (226 lb)   Height:  157.5 cm (62\")            FHT'S:   140 with moderate variability and accels                                     PHYSICAL EXAM:      General: well developed; well nourished  no acute distress   Heart: Not performed.   Lungs   breathing is unlabored   Abdomen: Gravid and non tender     Extremities: trace edema, DTRs 1 plus, no clonus       Cervix: Per RN  Cervical Dilation (cm): 7  Cervical Effacement: 90%  Fetal Station: -2  Cervical Consistency: soft  Cervical Position: mid-position     Contractions:   regular                    LABS AND TESTING ORDERED:  1. Uterine and fetal monitoring  2. Urinalysis  3. Admit labs, CMP, covid, urine protein create ratio    LAB RESULTS:    No results found for this or any previous visit (from the past 24 hour(s)).    Lab Results   Component Value Date    ABO O 06/05/2022    RH Positive 06/05/2022       Lab Results   Component Value Date    STREPGPB No Group B Streptococcus isolated 01/21/2021                 External Prenatal Results     Pregnancy Outside Results - Transcribed From Office Records - See Scanned Records For Details     Test Value Date Time    ABO  O  06/05/22 0549    Rh  Positive  06/05/22 0549    Antibody Screen  Negative  06/05/22 0549       Negative  05/11/22 1356       Negative  12/21/21 2125       Negative  12/01/21 1527    Varicella IgG  <135 index 12/01/21 1527    Rubella  2.12 index 12/01/21 1527    Hgb  11.8 g/dL 06/05/22 0415       11.5 g/dL 05/11/22 1352       14.1 g/dL 12/21/21 2125       13.6 g/dL 12/01/21 1527    Hct  34.9 % 06/05/22 0415       33.2 % 05/11/22 1352       41.7 % 12/21/21 2125       41.2 % 12/01/21 1527    Glucose Fasting GTT       Glucose Tolerance Test 1 hour ^ 83  10/07/16     Glucose Tolerance Test 3 hour       Gonorrhea (discrete)  Negative  12/21/21 2257    Chlamydia " (discrete)  Negative  21 2257    RPR  Non Reactive  21 1527    VDRL       Syphilis Antibody       HBsAg  Negative  21 1527    Herpes Simplex Virus PCR       Herpes Simplex VIrus Culture       HIV  Non Reactive  21 1527    Hep C RNA Quant PCR       Hep C Antibody  <0.1 s/co ratio 21 1527    AFP  38.5 ng/mL 18 1615    Group B Strep  No Group B Streptococcus isolated  21 1614    GBS Susceptibility to Clindamycin       GBS Susceptibility to Erythromycin       Fetal Fibronectin  Negative  22    Genetic Testing, Maternal Blood             Drug Screening     Test Value Date Time    Urine Drug Screen       Amphetamine Screen       Barbiturate Screen       Benzodiazepine Screen       Methadone Screen       Phencyclidine Screen       Opiates Screen       THC Screen       Cocaine Screen       Propoxyphene Screen       Buprenorphine Screen       Methamphetamine Screen       Oxycodone Screen       Tricyclic Antidepressants Screen             Legend    ^: Historical                          Impression:   @ 34w3d .  Final Diagnosis: active labor, hx of CHTN    Plan:  1. Dr Hutchins at bedside      Riya Ware MD  6/10/2022  04:44 EDT

## 2022-06-10 NOTE — ANESTHESIA PREPROCEDURE EVALUATION
Anesthesia Evaluation     Patient summary reviewed and Nursing notes reviewed   no history of anesthetic complications:  NPO Solid Status: > 6 hours  NPO Liquid Status: > 6 hours           Airway   Mallampati: II  TM distance: >3 FB  Neck ROM: full  no difficulty expected and No difficulty expected  Dental - normal exam     Pulmonary - negative pulmonary ROS and normal exam    breath sounds clear to auscultation  (-) rhonchi, decreased breath sounds, wheezes, rales, stridor  Cardiovascular - negative cardio ROS and normal exam    NYHA Classification: I  Rhythm: regular  Rate: normal    (-) murmur, weak pulses, friction rub, systolic click, carotid bruits, JVD, peripheral edema      Neuro/Psych  (+) headaches,    GI/Hepatic/Renal/Endo    (+) obesity,       Musculoskeletal (-) negative ROS    Abdominal  - normal exam    Abdomen: soft.   Substance History - negative use     OB/GYN    (+) Preeclampsia, pregnancy induced hypertension        Other - negative ROS                       Anesthesia Plan    ASA 3     epidural     intravenous induction     Anesthetic plan, risks, benefits, and alternatives have been provided, discussed and informed consent has been obtained with: patient.        CODE STATUS:

## 2022-06-10 NOTE — H&P
History and physical    Admission date 6/10/2022     Patient: Rhina Marquez MRN: [id number]   YOB: 1995 Age: 27 y.o. Sex: female     Chief Complaint   Patient presents with   • Contractions     Pt comes into the bruna c/o of ctx that started around 0215 this am. Pt states they aare coming every few mins. + fm noted pt denies any lof has had some spotting but had a ve on wed. Pt was sent home Monday after being here on mag for 8 hrs        HPI:  Rhina Marquez is a 27 y.o. female  at 34w3d with Estimated Date of Delivery: 22 who presents with the chief complaint listed above. P:t has had 2 prior PTD at 34 weeks and was 2 cms last check. Pt had Steroids on May 25,26. She also has CHTN and is taking labetelol 200 mg BID and has had delivery at 37 weeks due to this.     She sees Carlo Sanderson MD for her prenatal care. Her pregnancy has been complicated by: obesity, Varicella NI, vanishing twin first trimester, CHTN, intractable chronic paroxsymal hemicrania, carrier of fragile X, mutation of FMR1 gene , hx of poor compliance    She describes fetal movement as normal.  She denies rupture of membranes.  She denies vaginal bleeding. 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   • Compliance poor   • Request for sterilization   • Pregnant   •  labor in third trimester without  delivery   • Pregnancy     OB History    Para Term  AB Living   10 8 4 4 1 8   SAB IAB Ectopic Molar Multiple Live Births   1 0 0 0   8      # Outcome Date GA Lbr Avinash/2nd Weight Sex Delivery Anes PTL Lv   10 Current            9  21 34w2d   M Vag-Spont  Y JOAN   8  18 34w6d 03:03 / 00:16 3162 g (6 lb 15.5 oz) M Vag-Spont EPI Y JOAN      Birth Comments: del note reviewed - no comps -[facility]        Complications: Preeclampsia in postpartum period   7 Term 16 37w0d 03:02 / 00:13 3756 g (8 lb 4.5 oz) F Vag-Spont EPI N JOAN      Birth Comments: delivery note reviewed - no comps - [facility]       Complications: Preeclampsia   6 Term 11/05/15 37w0d  2906 g (6 lb 6.5 oz) F Vag-Spont   JOAN      Birth Comments: Delivery note reviewed.  No complications HB      Complications: Preeclampsia   5 SAB            4       Vag-Spont  Y JOAN   3       Vag-Spont  Y JOAN   2 Term      Vag-Spont   JOAN   1 Term      Vag-Spont   JOAN     Past Medical History:   Diagnosis Date   • Anxiety    • Asthma    • Depression     hx when younger   • Eye infection    • Gestational hypertension    • Maternal varicella, non-immune    • Mild preeclampsia    • Pre-eclampsia in postpartum period 2018   • Preeclampsia    • Psychogenic nonepileptic seizure     Neurologic workup to date is inconclusive for seizure disorder, pt did not follow through with EEG, saw neurologist Dr. Willam Calderon     Past Surgical History:   Procedure Laterality Date   • EAR TUBES       No current facility-administered medications on file prior to encounter.     Current Outpatient Medications on File Prior to Encounter   Medication Sig Dispense Refill   • labetalol (NORMODYNE) 200 MG tablet Take 1 tablet by mouth Every 12 (Twelve) Hours. 30 tablet 0   • PRENATAL GUMMY VITAMIN Chew 1 each Daily.     • ondansetron ODT (ZOFRAN-ODT) 4 MG disintegrating tablet          ROS:      Except as outlined in history of  "physical illness, patient denies any changes in her GYN, , GI systems. All other systems reviewed are negative.      OBJECTIVE:     Vitals:   Vitals:    06/10/22 0411 06/10/22 0422   BP:  146/83   BP Location:  Right arm   Patient Position:  Sitting   Pulse:  92   Resp:  18   Temp:  97.6 °F (36.4 °C)   TempSrc:  Oral   SpO2:  98%   Weight: 103 kg (226 lb 12.8 oz) 103 kg (226 lb)   Height:  157.5 cm (62\")         Appearance/Psychiatric: In no distress   Constitutional: The patient is well nourished   Cardiovascular: She does not have edema. Heart RRR  Respiratory: Respiratory effort is normal. CTAB   Abdomen: Soft, gravid.  Ext: nontender, no edema. +2/4 bilateral patellar reflexes   Cx; recently checked by Dr. Ware and found to be 7 cm vertex       LOS: 0 days    Willam Hutchins MD   Edwige 10, 2022    Assessment and Plan:   Pregnancy [Z34.90]  34 weeks 3 days intrauterine pregnancy  Active labor  History of  delivery  Has received betamethasone on the  and 26 May  History of preeclampsia with previous pregnancy  History of atypical epileptic syndrome inconclusive for seizure disorder as per neurology    Will admit, attempt to administer antibiotics, attempt to place epidural and proceed with anticipated delivery      "

## 2022-06-10 NOTE — LACTATION NOTE
This note was copied from a baby's chart.  NICU rounds w/team.  Mom has HGP at bedside & may need LC assist later today with direct breastfeeding per team.

## 2022-06-10 NOTE — PLAN OF CARE
Goal Outcome Evaluation:  Plan of Care Reviewed With: patient, spouse        Progress: improving  Outcome Evaluation: Vaginal delivery at 34+3 weeks, epidural in place but did not get comfortable before delivery, no tears, baby has facial bruising but doing well and was able to do EDOUARD briefly before transfer to nursery.

## 2022-06-10 NOTE — ANESTHESIA PROCEDURE NOTES
Labor Epidural      Patient location during procedure: OB  Indication:at surgeon's request  Performed By  Anesthesiologist: Sergey Kay MD  Preanesthetic Checklist  Completed: patient identified, IV checked, site marked, risks and benefits discussed, surgical consent, monitors and equipment checked, pre-op evaluation and timeout performed  Prep:  Pt Position:sitting  Sterile Tech:cap, gloves, mask and sterile barrier  Prep:chlorhexidine gluconate and isopropyl alcohol  Monitoring:blood pressure monitoring and EKG  Epidural Block Procedure:  Approach:midline  Guidance:landmark technique  Location:L2-L3  Needle Type:Tuohy  Needle Gauge:17  Loss of Resistance Medium: air  Cath Depth at skin:12 cm  Paresthesia: none  Aspiration:negative  Test Dose:negative  Number of Attempts: 1  Post Assessment:  Dressing:occlusive dressing applied, secured with tape and biopatch applied  Pt Tolerance:patient tolerated the procedure well with no apparent complications  Complications:no

## 2022-06-10 NOTE — LACTATION NOTE
Mom reports she has pumped once. She plans to start pumping every 3 hours. Reviewed pump use and cleaning. Gave mom sterilization bag and encouraged to use once a day. Mom denies questions at this time. Encouraged to call LC as needed. Gave mom script for personal pump    Lactation Consult Note    Evaluation Completed: 6/10/2022 17:17 EDT  Patient Name: Rhina Marquez  :  1995  MRN:  [id number]     REFERRAL  INFORMATION:                                         DELIVERY HISTORY:        Skin to skin initiation date/time: 6/10/2022  6:55 AM   Skin to skin end date/time: 6/10/2022  7:35 AM        MATERNAL ASSESSMENT:                               INFANT ASSESSMENT:  Information for the patient's :  Kena MarquezgregoryCheryl [[id number]]   No past medical history on file.                                                                                                     MATERNAL INFANT FEEDING:                                                                       EQUIPMENT TYPE:                                 BREAST PUMPING:          LACTATION REFERRALS:

## 2022-06-10 NOTE — PLAN OF CARE
Goal Outcome Evaluation:           Progress: improving  Outcome Evaluation: STABLE AND DOING WELL. ORDER TO START LABETALOL THIS PM. AMBULATES TO NICU OFTEN. USING EBP. PAIN COTROLLED WITH MOTRIN.

## 2022-06-10 NOTE — L&D DELIVERY NOTE
"[facility]  Vaginal Delivery Note    Delivery-34-week spontaneous vaginal delivery of a vigorous male infant Apgars 8 at 1 minute 9 at 5-minute weight is pending nuchal cord x1 easily reduced.  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  Affect on future generations     • Vanishing twin syndrome [O31.10X0]  Yes   • Supervision of high risk pregnancy, antepartum [O09.90]  Not Applicable   • Carrier of fragile X syndrome [Z14.8]  Not Applicable   • Obesity in pregnancy, antepartum [O99.210]  Yes   • Previous  delivery, antepartum [O09.219]  Not Applicable   • Susceptible to varicella (non-immune), currently pregnant [O09.899, Z28.39]  Yes   • Chronic hypertension affecting pregnancy [O10.919]  Yes     TP - 146 mg - 2020     • Generalized convulsive seizures (HCC) [R56.9]  Yes     Patient has seen a neurologist and he thought that these were anxiety related episodes and requested the patient see a cardiologist.  She only has this\" seizure-like activity\" during her pregnancies.        YOB: 2020    Time of Birth: 6:41 AM      Anesthesia: epidural    Delivering clinician: EMMA Hutchins                        Delivery narrative: Patient arrived in active labor at 7 cm, she had received betamethasone a couple weeks prior to admission.  She received epidural anesthesia with moderate pain relief.  She spontaneously voided and felt the urge to push.  Neonatology was called because of prematurity.              Patient was prepped and draped. The bladder had been drained.  The patient pushed for a spontaneous vaginal " delivery, as the vertex was gently guided over the perineum. The perineum was supported during the delivery process. Bulb suction was used for the mouth and oropharynx, loose nuchal cord x1 was easily reduced, the remainder of the infant was delivered without difficulty.  The infant was vigorous, moving all four extremities, and was  handed off to the waiting staff. The placenta followed  Spontaneously, it  was intact and with a 3 vessel cord.Uterus was massaged and firmed up appropriately.  Pitocin was given in the IVF's.  No tears no lacerations all counts were correct, mom and infant were doing well    Infant    Findings: male  infant  34w3d    Weight: No birth weight on file.   Length:   in  Observations/Comments:          @ 1 minute /     @ 5 minutes      Infant observations:            Infant Name:  Foster     Placenta, Cord, and Fluid    Placenta delivered    at       Cord:   present.   Nuchal Cord?  yes; Number of nuchal loops present:      Cord blood obtained: yes                 Repair    Episiotomy: No   Lacerations:  Laceration none   Estimated Blood Loss: See EMR for quantitative blood loss          Complications:                    None          Disposition  Mother and baby were stable and doing well.    Willam Hutchins MD  06/10/22  06:54 EDT

## 2022-06-10 NOTE — ANESTHESIA POSTPROCEDURE EVALUATION
Patient: Rhina Marquez    Procedure Summary     Date: 06/10/22 Room / Location:     Anesthesia Start: 0525 Anesthesia Stop: 0641    Procedure: LABOR ANALGESIA Diagnosis:     Scheduled Providers:  Provider: Sergey Kya MD    Anesthesia Type: epidural ASA Status: 3          Anesthesia Type: epidural    Vitals  Vitals Value Taken Time   /63 06/10/22 0831   Temp 36.6 °C (97.9 °F) 06/10/22 0650   Pulse 81 06/10/22 0831   Resp 18 06/10/22 0816   SpO2 96 % 06/10/22 0640   Vitals shown include unvalidated device data.        Post Anesthesia Care and Evaluation    Patient location during evaluation: bedside  Patient participation: complete - patient participated  Level of consciousness: awake  Pain management: adequate    Airway patency: patent  Anesthetic complications: No anesthetic complications  PONV Status: controlled  Cardiovascular status: acceptable  Respiratory status: acceptable  Hydration status: acceptable  Post Neuraxial Block status: Motor and sensory function returned to baseline and No signs or symptoms of PDPH

## 2022-06-11 LAB
BASOPHILS # BLD AUTO: 0.06 10*3/MM3 (ref 0–0.2)
BASOPHILS NFR BLD AUTO: 0.5 % (ref 0–1.5)
DEPRECATED RDW RBC AUTO: 41.5 FL (ref 37–54)
EOSINOPHIL # BLD AUTO: 0.2 10*3/MM3 (ref 0–0.4)
EOSINOPHIL NFR BLD AUTO: 1.7 % (ref 0.3–6.2)
ERYTHROCYTE [DISTWIDTH] IN BLOOD BY AUTOMATED COUNT: 13.6 % (ref 12.3–15.4)
HCT VFR BLD AUTO: 33.4 % (ref 34–46.6)
HGB BLD-MCNC: 10.9 G/DL (ref 12–15.9)
IMM GRANULOCYTES # BLD AUTO: 0.1 10*3/MM3 (ref 0–0.05)
IMM GRANULOCYTES NFR BLD AUTO: 0.9 % (ref 0–0.5)
LYMPHOCYTES # BLD AUTO: 2.62 10*3/MM3 (ref 0.7–3.1)
LYMPHOCYTES NFR BLD AUTO: 22.3 % (ref 19.6–45.3)
MCH RBC QN AUTO: 27.9 PG (ref 26.6–33)
MCHC RBC AUTO-ENTMCNC: 32.6 G/DL (ref 31.5–35.7)
MCV RBC AUTO: 85.4 FL (ref 79–97)
MONOCYTES # BLD AUTO: 0.68 10*3/MM3 (ref 0.1–0.9)
MONOCYTES NFR BLD AUTO: 5.8 % (ref 5–12)
NEUTROPHILS NFR BLD AUTO: 68.8 % (ref 42.7–76)
NEUTROPHILS NFR BLD AUTO: 8.08 10*3/MM3 (ref 1.7–7)
NRBC BLD AUTO-RTO: 0 /100 WBC (ref 0–0.2)
PLATELET # BLD AUTO: 254 10*3/MM3 (ref 140–450)
PMV BLD AUTO: 10.1 FL (ref 6–12)
RBC # BLD AUTO: 3.91 10*6/MM3 (ref 3.77–5.28)
WBC NRBC COR # BLD: 11.74 10*3/MM3 (ref 3.4–10.8)

## 2022-06-11 PROCEDURE — 85025 COMPLETE CBC W/AUTO DIFF WBC: CPT | Performed by: OBSTETRICS & GYNECOLOGY

## 2022-06-11 RX ORDER — METOPROLOL SUCCINATE 25 MG/1
25 TABLET, EXTENDED RELEASE ORAL
Status: DISCONTINUED | OUTPATIENT
Start: 2022-06-12 | End: 2022-06-12 | Stop reason: HOSPADM

## 2022-06-11 RX ADMIN — OXYCODONE AND ACETAMINOPHEN 1 TABLET: 5; 325 TABLET ORAL at 20:36

## 2022-06-11 RX ADMIN — IBUPROFEN 600 MG: 600 TABLET, FILM COATED ORAL at 20:35

## 2022-06-11 RX ADMIN — LABETALOL HYDROCHLORIDE 200 MG: 200 TABLET, FILM COATED ORAL at 08:28

## 2022-06-11 RX ADMIN — IBUPROFEN 600 MG: 600 TABLET, FILM COATED ORAL at 05:55

## 2022-06-11 RX ADMIN — DOCUSATE SODIUM 100 MG: 100 CAPSULE, LIQUID FILLED ORAL at 08:28

## 2022-06-11 RX ADMIN — DOCUSATE SODIUM 100 MG: 100 CAPSULE, LIQUID FILLED ORAL at 20:35

## 2022-06-11 RX ADMIN — LABETALOL HYDROCHLORIDE 200 MG: 200 TABLET, FILM COATED ORAL at 20:36

## 2022-06-11 NOTE — PROGRESS NOTES
Discharge Planning Assessment  [facility]     Patient Name: Rhina Marquez  MRN: [id number]  Today's Date: 2022    Admit Date: 6/10/2022     Discharge Needs Assessment    No documentation.                Discharge Plan     Row Name 22 1159       Plan    Plan Infant to discharge to mother when medically ready. Sol Zhong, CSW    Plan Comments Mother: Rhina Marquez, MRN [id number]. Infant: Lina “Foster” Alma, MRN [id number]. CSW consulted for “NICU admission” and “resources and NICU admission”. Of note, no UDS obtained on mother or infant, and cord toxicology was not sent as need was not warranted at this time. Also of note, infant was born at 34 weeks but is not eligible for SSI for low birth weight at this time. CSW met with mother and father of infant/spouse at infant’s bedside in the NICU. Mother verified address, phone number, and insurance. [employer] has not spoken with mother as of yet to add infant to insurance. Mother reports having a car seat, crib/bassinet, clothes, and diapers for infant. Mother and father shared about their other four children, ages 6, 5, 3, and 16 months. Mother shared maternal grandparents and maternal great-aunt have been watching their children while they have been in the hospital. Father shared his parents are , and CSW provided support to parents for their loss. Mother and father also shared their two youngest children were born at 34 weeks as well, and they are familiar with the NICU. [facility], mother is comfortable scheduling appointments, and has transportation. Mother is not current with [facility], but familiar with the program. Mother shared she will likely not be able to visit infant often due to limited childcare for her other children and a long commute to [facility], but voiced father will stop by frequently to drop off breastmilk for infant. CSW spent time building rapport with parents, and  offered validation, support, and encouragement throughout the discussion. CSW provided a packet of resources including: WIC, HANDS, transportation, infant supplies, counseling, online support groups, postpartum mood and anxiety resources, NICU parent resources, and general community resources. Mother and father were polite and cooperative throughout discussion. Mother was doing EDOUARD with infant throughout discussion and appeared to be bonding appropriately. CSW will remain available for support while infant is admitted to the NICU. KVNG Machuca              Continued Care and Services - Admitted Since 6/10/2022    Coordination has not been started for this encounter.          Demographic Summary     Row Name 06/11/22 1158       General Information    Admission Type inpatient    Arrived From home    Referral Source nursing    Reason for Consult psychosocial concerns    General Information Comments NICU admit               Functional Status    No documentation.                Psychosocial     Row Name 06/11/22 1159       Behavior WDL    Behavior WDL WDL       Emotion Mood WDL    Emotion/Mood/Affect WDL WDL       Speech WDL    Speech WDL WDL       Perceptual State WDL    Perceptual State WDL WDL       Thought Process WDL    Thought Process WDL WDL       Intellectual Performance WDL    Intellectual Performance WDL WDL       Coping/Stress    Major Change/Loss/Stressor birth;medical condition/diagnosis    Patient Personal Strengths able to adapt;courageous;flexibility;future/goal oriented;motivated;positive attitude    Sources of Support other family members;parent(s);spouse;dependent child(jozef)               Abuse/Neglect     Row Name 06/11/22 1156       Personal Safety    Physical Signs of Abuse Present no               Legal    No documentation.                Substance Abuse    No documentation.                Patient Forms    No documentation.                   ELOY Machuca

## 2022-06-11 NOTE — LACTATION NOTE
This note was copied from a baby's chart.  Called to help with breastfeeding 34w4d baby. Several attempts were made to latch baby but he ultimately fell asleep. Encouraged Mom to continue pumping every 3 hr. Mom reports exclusively pumping with her last baby x9 months and had enough milk to last throughout the year.

## 2022-06-11 NOTE — PLAN OF CARE
Goal Outcome Evaluation:           Progress: improving  Outcome Evaluation: VSS.  Pt up ad jonny and voiding without difficulty.  Fundal assessment and lochia, wnl.  Pain controlled w/scheduled motrin.  Scheduled Labetalol, BID.

## 2022-06-11 NOTE — PROGRESS NOTES
"Postpartum Vaginal Delivery Note PPD #1     CC: post vaginal delivery    Subjective:  Pain well controlled. Lochia normal. Ambulating well. Tolerating regular diet. Voiding without difficulty. No complaints.  The baby is in NICU.    ROS: No fever/chills. No N/V. No leg pain.    Vitals:   /77 (BP Location: Right arm, Patient Position: Sitting)   Pulse 91   Temp 98.1 °F (36.7 °C) (Oral)   Resp 16   Ht 157.5 cm (62\")   Wt 103 kg (226 lb)   LMP 10/08/2021 (Exact Date)   SpO2 98%   Breastfeeding Yes   BMI 41.34 kg/m²         Exam:   Gen: NAD, cooperative, conversive  Resp: Nonlabored  Abd: Soft, nondistended, fundus is firm below umbillicus, nontender  CV: Trace LE edema  Ext: Nontender bilaterally  Labs:  Recent Results (from the past 36 hour(s))   Urinalysis With Microscopic If Indicated (No Culture) - Urine, Clean Catch    Collection Time: 06/10/22  5:00 AM    Specimen: Urine, Clean Catch   Result Value Ref Range    Color, UA Yellow Yellow, Straw    Appearance, UA Clear Clear    pH, UA 6.0 5.0 - 8.0    Specific Gravity, UA 1.025 1.005 - 1.030    Glucose, UA Negative Negative    Ketones, UA Trace (A) Negative    Bilirubin, UA Negative Negative    Blood, UA Trace (A) Negative    Protein, UA Negative Negative    Leuk Esterase, UA Negative Negative    Nitrite, UA Negative Negative    Urobilinogen, UA 0.2 E.U./dL 0.2 - 1.0 E.U./dL   CBC (No Diff)    Collection Time: 06/10/22  5:00 AM    Specimen: Blood   Result Value Ref Range    WBC 12.70 (H) 3.40 - 10.80 10*3/mm3    RBC 4.17 3.77 - 5.28 10*6/mm3    Hemoglobin 11.5 (L) 12.0 - 15.9 g/dL    Hematocrit 34.3 34.0 - 46.6 %    MCV 82.3 79.0 - 97.0 fL    MCH 27.6 26.6 - 33.0 pg    MCHC 33.5 31.5 - 35.7 g/dL    RDW 13.2 12.3 - 15.4 %    RDW-SD 38.5 37.0 - 54.0 fl    MPV 10.2 6.0 - 12.0 fL    Platelets 283 140 - 450 10*3/mm3   Type & Screen    Collection Time: 06/10/22  5:00 AM    Specimen: Blood   Result Value Ref Range    ABO Type O     RH type Positive     " Antibody Screen Negative     T&S Expiration Date 6/13/2022 11:59:59 PM    Comprehensive Metabolic Panel    Collection Time: 06/10/22  5:00 AM    Specimen: Blood   Result Value Ref Range    Glucose 84 65 - 99 mg/dL    BUN 9 6 - 20 mg/dL    Creatinine 0.43 (L) 0.57 - 1.00 mg/dL    Sodium 135 (L) 136 - 145 mmol/L    Potassium 3.9 3.5 - 5.2 mmol/L    Chloride 104 98 - 107 mmol/L    CO2 21.0 (L) 22.0 - 29.0 mmol/L    Calcium 9.0 8.6 - 10.5 mg/dL    Total Protein 6.6 6.0 - 8.5 g/dL    Albumin 3.70 3.50 - 5.20 g/dL    ALT (SGPT) 23 1 - 33 U/L    AST (SGOT) 13 1 - 32 U/L    Alkaline Phosphatase 110 39 - 117 U/L    Total Bilirubin 0.3 0.0 - 1.2 mg/dL    Globulin 2.9 gm/dL    A/G Ratio 1.3 g/dL    BUN/Creatinine Ratio 20.9 7.0 - 25.0    Anion Gap 10.0 5.0 - 15.0 mmol/L    eGFR 138.6 >60.0 mL/min/1.73   Protein / Creatinine Ratio, Urine - Urine, Clean Catch    Collection Time: 06/10/22  5:00 AM    Specimen: Urine, Clean Catch   Result Value Ref Range    Protein/Creatinine Ratio, Urine 131.7 0.0 - 200.0 mg/G Crea    Creatinine, Urine 115.4 mg/dL    Total Protein, Urine 15.2 mg/dL   Urinalysis, Microscopic Only - Urine, Clean Catch    Collection Time: 06/10/22  5:00 AM    Specimen: Urine, Clean Catch   Result Value Ref Range    RBC, UA 0-2 None Seen, 0-2 /HPF    WBC, UA 0-2 None Seen, 0-2 /HPF    Bacteria, UA None Seen None Seen /HPF    Squamous Epithelial Cells, UA 0-2 None Seen, 0-2 /HPF    Hyaline Casts, UA 0-2 None Seen /LPF    Methodology Automated Microscopy    COVID-19,[facility]USSEIN IN-HOUSE CEPHEID/NYA NP SWAB IN TRANSPORT MEDIA 8-12 HR TAT - Swab, Nasopharynx    Collection Time: 06/10/22  5:01 AM    Specimen: Nasopharynx; Swab   Result Value Ref Range    COVID19 Not Detected Not Detected - Ref. 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 female  s/p   Premature delivery at 34 weeks.  Baby is in the NICU.  Chronic hypertension-blood pressure is good on labetalol 200 mg twice daily.  Patient is doing well overall.  Encouraged ambulation.         Signed By:  Abdirashid Romero MD       2022 10:40 EDT

## 2022-06-12 VITALS
HEIGHT: 62 IN | DIASTOLIC BLOOD PRESSURE: 84 MMHG | RESPIRATION RATE: 18 BRPM | OXYGEN SATURATION: 98 % | TEMPERATURE: 98 F | BODY MASS INDEX: 41.59 KG/M2 | WEIGHT: 226 LBS | SYSTOLIC BLOOD PRESSURE: 129 MMHG | HEART RATE: 82 BPM

## 2022-06-12 RX ORDER — METOPROLOL SUCCINATE 25 MG/1
25 TABLET, EXTENDED RELEASE ORAL DAILY
Qty: 30 TABLET | Refills: 1 | Status: SHIPPED | OUTPATIENT
Start: 2022-06-12 | End: 2022-06-28

## 2022-06-12 RX ORDER — METOPROLOL SUCCINATE 25 MG/1
25 TABLET, EXTENDED RELEASE ORAL
Qty: 30 TABLET | Refills: 1 | Status: SHIPPED | OUTPATIENT
Start: 2022-06-13 | End: 2022-06-28

## 2022-06-12 RX ORDER — IBUPROFEN 600 MG/1
600 TABLET ORAL EVERY 6 HOURS SCHEDULED
Qty: 30 TABLET | Refills: 1 | Status: ON HOLD | OUTPATIENT
Start: 2022-06-12 | End: 2022-08-29

## 2022-06-12 RX ORDER — IBUPROFEN 600 MG/1
600 TABLET ORAL EVERY 6 HOURS PRN
Qty: 30 TABLET | Refills: 1 | Status: ON HOLD | OUTPATIENT
Start: 2022-06-12 | End: 2022-08-29

## 2022-06-12 RX ADMIN — DOCUSATE SODIUM 100 MG: 100 CAPSULE, LIQUID FILLED ORAL at 09:01

## 2022-06-12 RX ADMIN — METOPROLOL SUCCINATE 25 MG: 25 TABLET, EXTENDED RELEASE ORAL at 09:01

## 2022-06-12 NOTE — DISCHARGE SUMMARY
.  [facility]   Obstetrics and Gynecology   Vaginal delivery Discharge Summary      Date of Admission: 6/10/2022    Date of Discharge:  2022    Patient: Rhina Marquez      MR#:[id number]    Surgeon/OB: Willam Hutchins     Discharge Diagnosis: Vaginal Delivery at 34w3d, uncomplicated recovery    Procedures:  Vaginal, Spontaneous     6/10/2022    6:41 AM      Anesthesia:  Epidural     Presenting Problem/History of Present Illness  Pregnancy [Z34.90]     Patient Active Problem List   Diagnosis   • Generalized convulsive seizures (HCC)   • Hx of preeclampsia, prior pregnancy, currently pregnant   • Chronic hypertension affecting pregnancy   • Chronic headaches   • Pituitary gland enlarged (HCC)   • Susceptible to varicella (non-immune), currently pregnant   • Obesity in pregnancy, antepartum   • Previous  delivery, antepartum   • Carrier of fragile X syndrome   • Supervision of high risk pregnancy, antepartum   • History of varicella vaccination   • Vanishing twin syndrome   • Genetic screening   • 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 female  at 34w3d status post vaginal delivery.    Uneventful recovery.  Patient is ambulating, tolerating a regular diet.  Perineum is intact.    Infant:   male  fetus 2760 g (6 lb 1.4 oz)  with Apgar scores of 7 , 8  at five minutes.    Condition on Discharge:  Stable    Vital Signs  Temp:  [97.9 °F (36.6 °C)-98.5 °F (36.9 °C)] 98 °F (36.7 °C)  Heart Rate:  [69-82] 82  Resp:  [18-20] 18  BP: (116-146)/(70-93) 129/84    Results from last 7 days   Lab Units 22  1153 06/10/22  0500   WBC 10*3/mm3 11.74* 12.70*   HEMOGLOBIN g/dL 10.9* 11.5*    HEMATOCRIT % 33.4* 34.3   PLATELETS 10*3/mm3 254 283     Results from last 7 days   Lab Units 06/10/22  0500   SODIUM mmol/L 135*   POTASSIUM mmol/L 3.9   CHLORIDE mmol/L 104   CO2 mmol/L 21.0*   BUN mg/dL 9   CREATININE mg/dL 0.43*   CALCIUM mg/dL 9.0   BILIRUBIN mg/dL 0.3   ALK PHOS U/L 110   ALT (SGPT) U/L 23   AST (SGOT) U/L 13   GLUCOSE mg/dL 84         Discharge Disposition  Home or Self Care    Discharge Medications     Discharge Medications      New Medications      Instructions Start Date   ibuprofen 600 MG tablet  Commonly known as: ADVIL,MOTRIN   600 mg, Oral, Every 6 Hours Scheduled      ibuprofen 600 MG tablet  Commonly known as: ADVIL,MOTRIN   600 mg, Oral, Every 6 Hours PRN      metoprolol succinate XL 25 MG 24 hr tablet  Commonly known as: Toprol XL   25 mg, Oral, Daily      metoprolol succinate XL 25 MG 24 hr tablet  Commonly known as: TOPROL-XL   25 mg, Oral, Every 24 Hours Scheduled   Start Date: June 13, 2022        Continue These Medications      Instructions Start Date   ondansetron ODT 4 MG disintegrating tablet  Commonly known as: ZOFRAN-ODT   No dose, route, or frequency recorded.      PRENATAL GUMMY VITAMIN   1 each, Oral, Daily         Stop These Medications    labetalol 200 MG tablet  Commonly known as: NORMODYNE            Discharge Diet: Regular    Activity at Discharge:     Follow-up Appointments  No future appointments.  Additional Instructions for the Follow-ups that You Need to Schedule     Call MD for problems / concerns.   As directed      Heavy bleeding:  Greater than a pad an hour for more than 2 hours  Fever greater than 101.3   Redness of the episiotomy or vaginal laceration and/or severe pain increased from discharge pain.      Order Comments: Heavy bleeding:  Greater than a pad an hour for more than 2 hours Fever greater than 101.3 Redness of the episiotomy or vaginal laceration and/or severe pain increased from discharge pain.           Discharge Follow-up with Specified  Provider: Kin; 3 Days   As directed      To: Kin    Follow Up: 3 Days                 Prenatal labs/vax:   Immunization History   Administered Date(s) Administered   • Flu Vaccine Quad PF >36MO 12/12/2016   • FluLaval/Fluarix/Fluzone >6 09/15/2020   • MMR 12/19/2016   • Tdap 10/07/2016, 07/19/2018, 12/23/2020   • Varicella 12/19/2016         External Prenatal Results     Pregnancy Outside Results - Transcribed From Office Records - See Scanned Records For Details     Test Value Date Time    ABO  O  06/10/22 0500    Rh  Positive  06/10/22 0500    Antibody Screen  Negative  06/10/22 0500       Negative  06/05/22 0549       Negative  05/11/22 1356       Negative  12/21/21 2125       Negative  12/01/21 1527    Varicella IgG  <135 index 12/01/21 1527    Rubella  2.12 index 12/01/21 1527    Hgb  10.9 g/dL 06/11/22 1153       11.5 g/dL 06/10/22 0500       11.8 g/dL 06/05/22 0415       11.5 g/dL 05/11/22 1352       14.1 g/dL 12/21/21 2125       13.6 g/dL 12/01/21 1527    Hct  33.4 % 06/11/22 1153       34.3 % 06/10/22 0500       34.9 % 06/05/22 0415       33.2 % 05/11/22 1352       41.7 % 12/21/21 2125       41.2 % 12/01/21 1527    Glucose Fasting GTT       Glucose Tolerance Test 1 hour ^ 83  10/07/16     Glucose Tolerance Test 3 hour       Gonorrhea (discrete)  Negative  12/21/21 2257    Chlamydia (discrete)  Negative  12/21/21 2257    RPR  Non Reactive  12/01/21 1527    VDRL       Syphilis Antibody       HBsAg  Negative  12/01/21 1527    Herpes Simplex Virus PCR       Herpes Simplex VIrus Culture       HIV  Non Reactive  12/01/21 1527    Hep C RNA Quant PCR       Hep C Antibody  <0.1 s/co ratio 12/01/21 1527    AFP  38.5 ng/mL 05/29/18 1615    Group B Strep  No Group B Streptococcus isolated  01/21/21 1614    GBS Susceptibility to Clindamycin       GBS Susceptibility to Erythromycin       Fetal Fibronectin  Negative  05/25/22 2005    Genetic Testing, Maternal Blood             Drug Screening     Test Value Date Time     Urine Drug Screen       Amphetamine Screen       Barbiturate Screen       Benzodiazepine Screen       Methadone Screen       Phencyclidine Screen       Opiates Screen       THC Screen       Cocaine Screen       Propoxyphene Screen       Buprenorphine Screen       Methamphetamine Screen       Oxycodone Screen       Tricyclic Antidepressants Screen             Legend    ^: Historical                          Paola Montelongo MD  06/12/22  16:08 EDT

## 2022-06-17 ENCOUNTER — HOSPITAL ENCOUNTER (OUTPATIENT)
Facility: HOSPITAL | Age: 27
Setting detail: OBSERVATION
Discharge: HOME | End: 2022-06-17
Attending: OBSTETRICS & GYNECOLOGY | Admitting: OBSTETRICS & GYNECOLOGY

## 2022-06-17 ENCOUNTER — POSTPARTUM VISIT (OUTPATIENT)
Dept: OBSTETRICS AND GYNECOLOGY | Age: 27
End: 2022-06-17

## 2022-06-17 VITALS
RESPIRATION RATE: 16 BRPM | WEIGHT: 220 LBS | OXYGEN SATURATION: 99 % | DIASTOLIC BLOOD PRESSURE: 85 MMHG | SYSTOLIC BLOOD PRESSURE: 132 MMHG | HEIGHT: 62 IN | TEMPERATURE: 98.9 F | BODY MASS INDEX: 40.48 KG/M2 | HEART RATE: 64 BPM

## 2022-06-17 VITALS
BODY MASS INDEX: 40.63 KG/M2 | DIASTOLIC BLOOD PRESSURE: 118 MMHG | WEIGHT: 220.8 LBS | SYSTOLIC BLOOD PRESSURE: 152 MMHG | HEIGHT: 62 IN

## 2022-06-17 LAB
ALBUMIN SERPL-MCNC: 3.5 G/DL (ref 3.5–5.2)
ALBUMIN/GLOB SERPL: 1.4 G/DL
ALP SERPL-CCNC: 88 U/L (ref 39–117)
ALT SERPL W P-5'-P-CCNC: 21 U/L (ref 1–33)
ANION GAP SERPL CALCULATED.3IONS-SCNC: 12 MMOL/L (ref 5–15)
AST SERPL-CCNC: 17 U/L (ref 1–32)
BASOPHILS # BLD AUTO: 0.05 10*3/MM3 (ref 0–0.2)
BASOPHILS NFR BLD AUTO: 0.5 % (ref 0–1.5)
BILIRUB SERPL-MCNC: 0.2 MG/DL (ref 0–1.2)
BUN SERPL-MCNC: 16 MG/DL (ref 6–20)
BUN/CREAT SERPL: 32 (ref 7–25)
CALCIUM SPEC-SCNC: 8.7 MG/DL (ref 8.6–10.5)
CHLORIDE SERPL-SCNC: 105 MMOL/L (ref 98–107)
CO2 SERPL-SCNC: 22 MMOL/L (ref 22–29)
CREAT SERPL-MCNC: 0.5 MG/DL (ref 0.57–1)
CREAT UR-MCNC: 105.8 MG/DL
DEPRECATED RDW RBC AUTO: 37.8 FL (ref 37–54)
EGFRCR SERPLBLD CKD-EPI 2021: 133.7 ML/MIN/1.73
EOSINOPHIL # BLD AUTO: 0.44 10*3/MM3 (ref 0–0.4)
EOSINOPHIL NFR BLD AUTO: 4.8 % (ref 0.3–6.2)
ERYTHROCYTE [DISTWIDTH] IN BLOOD BY AUTOMATED COUNT: 13.3 % (ref 12.3–15.4)
GLOBULIN UR ELPH-MCNC: 2.5 GM/DL
GLUCOSE SERPL-MCNC: 99 MG/DL (ref 65–99)
HCT VFR BLD AUTO: 33.8 % (ref 34–46.6)
HGB BLD-MCNC: 11.8 G/DL (ref 12–15.9)
IMM GRANULOCYTES # BLD AUTO: 0.05 10*3/MM3 (ref 0–0.05)
IMM GRANULOCYTES NFR BLD AUTO: 0.5 % (ref 0–0.5)
LYMPHOCYTES # BLD AUTO: 2.53 10*3/MM3 (ref 0.7–3.1)
LYMPHOCYTES NFR BLD AUTO: 27.4 % (ref 19.6–45.3)
MCH RBC QN AUTO: 28.4 PG (ref 26.6–33)
MCHC RBC AUTO-ENTMCNC: 34.9 G/DL (ref 31.5–35.7)
MCV RBC AUTO: 81.4 FL (ref 79–97)
MONOCYTES # BLD AUTO: 0.64 10*3/MM3 (ref 0.1–0.9)
MONOCYTES NFR BLD AUTO: 6.9 % (ref 5–12)
NEUTROPHILS NFR BLD AUTO: 5.53 10*3/MM3 (ref 1.7–7)
NEUTROPHILS NFR BLD AUTO: 59.9 % (ref 42.7–76)
NRBC BLD AUTO-RTO: 0 /100 WBC (ref 0–0.2)
PLATELET # BLD AUTO: 358 10*3/MM3 (ref 140–450)
PMV BLD AUTO: 10 FL (ref 6–12)
POTASSIUM SERPL-SCNC: 3.7 MMOL/L (ref 3.5–5.2)
PROT ?TM UR-MCNC: 15.7 MG/DL
PROT SERPL-MCNC: 6 G/DL (ref 6–8.5)
PROT/CREAT UR: 148.4 MG/G CREA (ref 0–200)
RBC # BLD AUTO: 4.15 10*6/MM3 (ref 3.77–5.28)
SODIUM SERPL-SCNC: 139 MMOL/L (ref 136–145)
WBC NRBC COR # BLD: 9.24 10*3/MM3 (ref 3.4–10.8)

## 2022-06-17 PROCEDURE — 99213 OFFICE O/P EST LOW 20 MIN: CPT | Performed by: NURSE PRACTITIONER

## 2022-06-17 PROCEDURE — 80053 COMPREHEN METABOLIC PANEL: CPT | Performed by: OBSTETRICS & GYNECOLOGY

## 2022-06-17 PROCEDURE — G0378 HOSPITAL OBSERVATION PER HR: HCPCS

## 2022-06-17 PROCEDURE — 82570 ASSAY OF URINE CREATININE: CPT | Performed by: OBSTETRICS & GYNECOLOGY

## 2022-06-17 PROCEDURE — 84156 ASSAY OF PROTEIN URINE: CPT | Performed by: OBSTETRICS & GYNECOLOGY

## 2022-06-17 PROCEDURE — 85025 COMPLETE CBC W/AUTO DIFF WBC: CPT | Performed by: OBSTETRICS & GYNECOLOGY

## 2022-06-17 RX ORDER — LIDOCAINE HYDROCHLORIDE 10 MG/ML
5 INJECTION, SOLUTION EPIDURAL; INFILTRATION; INTRACAUDAL; PERINEURAL AS NEEDED
Status: DISCONTINUED | OUTPATIENT
Start: 2022-06-17 | End: 2022-06-18 | Stop reason: HOSPADM

## 2022-06-17 RX ORDER — SODIUM CHLORIDE 0.9 % (FLUSH) 0.9 %
10 SYRINGE (ML) INJECTION EVERY 12 HOURS SCHEDULED
Status: DISCONTINUED | OUTPATIENT
Start: 2022-06-17 | End: 2022-06-18 | Stop reason: HOSPADM

## 2022-06-17 RX ORDER — METOPROLOL SUCCINATE 25 MG/1
25 TABLET, EXTENDED RELEASE ORAL
Status: COMPLETED | OUTPATIENT
Start: 2022-06-17 | End: 2022-06-17

## 2022-06-17 RX ORDER — SODIUM CHLORIDE 0.9 % (FLUSH) 0.9 %
10 SYRINGE (ML) INJECTION AS NEEDED
Status: DISCONTINUED | OUTPATIENT
Start: 2022-06-17 | End: 2022-06-18 | Stop reason: HOSPADM

## 2022-06-17 RX ADMIN — METOPROLOL SUCCINATE 25 MG: 25 TABLET, EXTENDED RELEASE ORAL at 20:30

## 2022-06-17 NOTE — NURSING NOTE
Dr. Munoz  Notified of serial BP readings (see flowsheets), lab values, normareflexive, light headache, no clonus. Orders received to increase daily BP meds to 50mg.  Give an extra 25 mg now and watch pt for additional 1.5 hours and then update MD for orders to dc home.

## 2022-06-17 NOTE — PROGRESS NOTES
Patient presents for a postpartum visit. She is 1 week postpartum following a spontaneous vaginal delivery. I have fully reviewed the prenatal and intrapartum course. The delivery was at 34 gestational weeks. Outcome: spontaneous vaginal delivery. Anesthesia: epidural. Postpartum course has been uncomplicated. Baby's course has been complicated by prematurity. Baby is feeding by breast and bottle. Bleeding thin lochia. Bowel function is normal. Bladder function is normal.     BP is significantly elevated on several occasions. She is advised to report directly to labor and delivery for further evaluation.       The following portions of the patient's history were reviewed and updated as appropriate: allergies, current medications, past family history, past medical history, past social history, past surgical history and problem list.    Review of Systems  Pertinent items are noted in HPI.        Vitals:    06/17/22 1420   BP: (!) 152/118       General:  alert, appears stated age and cooperative    Breasts:  deferred   Lungs: clear to auscultation bilaterally   Heart:  regular rate and rhythm, S1, S2 normal, no murmur, click, rub or gallop   Abdomen: soft, non-tender; bowel sounds normal; no masses,  no organomegaly    No swelling, redness or tenderness in LE bilaterally                               Dr. Munoz and labor and delivery aware      Caroline Arreguin, APRN

## 2022-06-18 NOTE — NURSING NOTE
Pt given written and verbal discharge instructions and verbalized understanding. Pt ambulatory to home per self and mother.  Pt left unit to see infant in NICU prior to leaving hospital.

## 2022-06-18 NOTE — DISCHARGE INSTRUCTIONS
Keep scheduled appointments.  Return if blood pressure increases to greater than 160/110, headaches worsen, develop abdominal pain, blurry vision or seeing spots.  Increase daily toprol to 50 miligrams per day.

## 2022-06-20 NOTE — PROGRESS NOTES
She was seen on L&D. Labs wnl, BP elevated but not severe range. Planned to double her metoprolol and recommended ambulatory BP monitoring. Needs repeat check of BP in the office later this week    Clarissa Munoz MD  6/20/2022  13:39 EDT

## 2022-06-28 DIAGNOSIS — I10 ESSENTIAL HYPERTENSION: Primary | ICD-10-CM

## 2022-06-28 RX ORDER — METOPROLOL SUCCINATE 50 MG/1
50 TABLET, EXTENDED RELEASE ORAL DAILY
Qty: 30 TABLET | Refills: 6 | Status: SHIPPED | OUTPATIENT
Start: 2022-06-28 | End: 2022-09-14

## 2022-08-26 ENCOUNTER — TELEPHONE (OUTPATIENT)
Dept: OBSTETRICS AND GYNECOLOGY | Age: 27
End: 2022-08-26

## 2022-08-26 DIAGNOSIS — Z30.2 REQUEST FOR STERILIZATION: Primary | ICD-10-CM

## 2022-08-26 NOTE — TELEPHONE ENCOUNTER
Provider: DR. YUAN NEFF    Caller: IBRAHIMA LIZ    Relationship to Patient: SELF      Phone Number: [phone]    Reason for Call: PT WOULD LIKE TO HAVE HER TUBES TIED NOW. ADV SHE HAS ALREADY CONSULTED WITH DR. NEFF ON THIS. PLS CALL HER REGARDING THIS PROCEDURE. CAN CALL ANYTIME AND CAN LVM IF NA.    When was the patient last seen: POSTPARTUM 6-17-22

## 2022-08-28 PROBLEM — Z15.89: Status: RESOLVED | Noted: 2022-01-12 | Resolved: 2022-08-28

## 2022-08-28 PROBLEM — O60.03 PRETERM LABOR IN THIRD TRIMESTER WITHOUT DELIVERY: Status: RESOLVED | Noted: 2022-06-05 | Resolved: 2022-08-28

## 2022-08-28 PROBLEM — Z13.79 GENETIC SCREENING: Status: RESOLVED | Noted: 2022-01-12 | Resolved: 2022-08-28

## 2022-08-28 PROBLEM — Z91.199 COMPLIANCE POOR: Status: RESOLVED | Noted: 2022-04-13 | Resolved: 2022-08-28

## 2022-08-28 PROBLEM — Z34.90 PREGNANCY: Status: RESOLVED | Noted: 2022-06-10 | Resolved: 2022-08-28

## 2022-08-28 PROBLEM — O31.10X0 VANISHING TWIN SYNDROME: Status: RESOLVED | Noted: 2021-12-29 | Resolved: 2022-08-28

## 2022-08-28 PROBLEM — Z34.90 PREGNANT: Status: RESOLVED | Noted: 2022-06-05 | Resolved: 2022-08-28

## 2022-08-28 PROBLEM — O09.219 PREVIOUS PRETERM DELIVERY, ANTEPARTUM: Status: RESOLVED | Noted: 2020-08-18 | Resolved: 2022-08-28

## 2022-08-28 PROBLEM — O09.90 SUPERVISION OF HIGH RISK PREGNANCY, ANTEPARTUM: Status: RESOLVED | Noted: 2020-12-23 | Resolved: 2022-08-28

## 2022-08-28 PROBLEM — R51.9 CHRONIC HEADACHES: Status: RESOLVED | Noted: 2018-06-06 | Resolved: 2022-08-28

## 2022-08-28 PROBLEM — O09.299 HX OF PREECLAMPSIA, PRIOR PREGNANCY, CURRENTLY PREGNANT: Status: RESOLVED | Noted: 2018-03-05 | Resolved: 2022-08-28

## 2022-08-28 PROBLEM — O99.210 OBESITY IN PREGNANCY, ANTEPARTUM: Status: RESOLVED | Noted: 2020-08-18 | Resolved: 2022-08-28

## 2022-08-28 PROBLEM — G89.29 CHRONIC HEADACHES: Status: RESOLVED | Noted: 2018-06-06 | Resolved: 2022-08-28

## 2022-08-28 PROBLEM — Z14.8 CARRIER OF FRAGILE X SYNDROME: Status: RESOLVED | Noted: 2020-09-01 | Resolved: 2022-08-28

## 2022-08-28 PROBLEM — O10.919 CHRONIC HYPERTENSION AFFECTING PREGNANCY: Status: RESOLVED | Noted: 2018-06-06 | Resolved: 2022-08-28

## 2022-08-29 ENCOUNTER — ANESTHESIA (OUTPATIENT)
Dept: PERIOP | Facility: HOSPITAL | Age: 27
End: 2022-08-29

## 2022-08-29 ENCOUNTER — HOSPITAL ENCOUNTER (OUTPATIENT)
Facility: HOSPITAL | Age: 27
Setting detail: HOSPITAL OUTPATIENT SURGERY
Discharge: HOME | End: 2022-08-29
Attending: OBSTETRICS & GYNECOLOGY | Admitting: OBSTETRICS & GYNECOLOGY

## 2022-08-29 ENCOUNTER — ANESTHESIA EVENT (OUTPATIENT)
Dept: PERIOP | Facility: HOSPITAL | Age: 27
End: 2022-08-29

## 2022-08-29 VITALS
RESPIRATION RATE: 16 BRPM | DIASTOLIC BLOOD PRESSURE: 82 MMHG | SYSTOLIC BLOOD PRESSURE: 153 MMHG | OXYGEN SATURATION: 97 % | BODY MASS INDEX: 40.83 KG/M2 | WEIGHT: 221.9 LBS | TEMPERATURE: 97.7 F | HEIGHT: 62 IN | HEART RATE: 73 BPM

## 2022-08-29 DIAGNOSIS — Z98.890 S/P LAPAROSCOPY: Primary | ICD-10-CM

## 2022-08-29 DIAGNOSIS — Z30.2 REQUEST FOR STERILIZATION: ICD-10-CM

## 2022-08-29 LAB
B-HCG UR QL: NEGATIVE
BASOPHILS # BLD AUTO: 0.06 10*3/MM3 (ref 0–0.2)
BASOPHILS NFR BLD AUTO: 0.6 % (ref 0–1.5)
DEPRECATED RDW RBC AUTO: 47 FL (ref 37–54)
EOSINOPHIL # BLD AUTO: 0.63 10*3/MM3 (ref 0–0.4)
EOSINOPHIL NFR BLD AUTO: 6 % (ref 0.3–6.2)
ERYTHROCYTE [DISTWIDTH] IN BLOOD BY AUTOMATED COUNT: 16 % (ref 12.3–15.4)
EXPIRATION DATE: NORMAL
HCT VFR BLD AUTO: 37.9 % (ref 34–46.6)
HGB BLD-MCNC: 12.8 G/DL (ref 12–15.9)
IMM GRANULOCYTES # BLD AUTO: 0.1 10*3/MM3 (ref 0–0.05)
IMM GRANULOCYTES NFR BLD AUTO: 1 % (ref 0–0.5)
INTERNAL NEGATIVE CONTROL: NORMAL
INTERNAL POSITIVE CONTROL: NORMAL
LYMPHOCYTES # BLD AUTO: 3.26 10*3/MM3 (ref 0.7–3.1)
LYMPHOCYTES NFR BLD AUTO: 31.3 % (ref 19.6–45.3)
Lab: NORMAL
MCH RBC QN AUTO: 27.4 PG (ref 26.6–33)
MCHC RBC AUTO-ENTMCNC: 33.8 G/DL (ref 31.5–35.7)
MCV RBC AUTO: 81.2 FL (ref 79–97)
MONOCYTES # BLD AUTO: 0.78 10*3/MM3 (ref 0.1–0.9)
MONOCYTES NFR BLD AUTO: 7.5 % (ref 5–12)
NEUTROPHILS NFR BLD AUTO: 5.6 10*3/MM3 (ref 1.7–7)
NEUTROPHILS NFR BLD AUTO: 53.6 % (ref 42.7–76)
NRBC BLD AUTO-RTO: 0 /100 WBC (ref 0–0.2)
PLATELET # BLD AUTO: 303 10*3/MM3 (ref 140–450)
PMV BLD AUTO: 10.2 FL (ref 6–12)
RBC # BLD AUTO: 4.67 10*6/MM3 (ref 3.77–5.28)
SARS-COV-2 RNA PNL SPEC NAA+PROBE: NOT DETECTED
WBC NRBC COR # BLD: 10.43 10*3/MM3 (ref 3.4–10.8)

## 2022-08-29 PROCEDURE — 25010000002 PROPOFOL 10 MG/ML EMULSION: Performed by: ANESTHESIOLOGY

## 2022-08-29 PROCEDURE — 25010000002 HYDROMORPHONE PER 4 MG: Performed by: ANESTHESIOLOGY

## 2022-08-29 PROCEDURE — 25010000002 SUCCINYLCHOLINE PER 20 MG: Performed by: ANESTHESIOLOGY

## 2022-08-29 PROCEDURE — 63710000001 PROMETHAZINE PER 25 MG: Performed by: ANESTHESIOLOGY

## 2022-08-29 PROCEDURE — 25010000002 DEXAMETHASONE PER 1 MG: Performed by: ANESTHESIOLOGY

## 2022-08-29 PROCEDURE — 81025 URINE PREGNANCY TEST: CPT | Performed by: ANESTHESIOLOGY

## 2022-08-29 PROCEDURE — 25010000002 FENTANYL CITRATE (PF) 50 MCG/ML SOLUTION: Performed by: ANESTHESIOLOGY

## 2022-08-29 PROCEDURE — 25010000002 MIDAZOLAM PER 1 MG: Performed by: ANESTHESIOLOGY

## 2022-08-29 PROCEDURE — 85025 COMPLETE CBC W/AUTO DIFF WBC: CPT | Performed by: OBSTETRICS & GYNECOLOGY

## 2022-08-29 PROCEDURE — 25010000002 NEOSTIGMINE 5 MG/10ML SOLUTION: Performed by: NURSE ANESTHETIST, CERTIFIED REGISTERED

## 2022-08-29 PROCEDURE — 87635 SARS-COV-2 COVID-19 AMP PRB: CPT | Performed by: OBSTETRICS & GYNECOLOGY

## 2022-08-29 PROCEDURE — 25010000002 HYDROMORPHONE PER 4 MG: Performed by: NURSE ANESTHETIST, CERTIFIED REGISTERED

## 2022-08-29 PROCEDURE — C9803 HOPD COVID-19 SPEC COLLECT: HCPCS

## 2022-08-29 PROCEDURE — 25010000002 KETOROLAC TROMETHAMINE PER 15 MG: Performed by: NURSE ANESTHETIST, CERTIFIED REGISTERED

## 2022-08-29 PROCEDURE — 25010000002 ONDANSETRON PER 1 MG: Performed by: ANESTHESIOLOGY

## 2022-08-29 PROCEDURE — 88302 TISSUE EXAM BY PATHOLOGIST: CPT | Performed by: OBSTETRICS & GYNECOLOGY

## 2022-08-29 PROCEDURE — 58661 LAPAROSCOPY REMOVE ADNEXA: CPT | Performed by: OBSTETRICS & GYNECOLOGY

## 2022-08-29 PROCEDURE — 25010000002 ONDANSETRON PER 1 MG: Performed by: NURSE ANESTHETIST, CERTIFIED REGISTERED

## 2022-08-29 RX ORDER — ONDANSETRON 2 MG/ML
INJECTION INTRAMUSCULAR; INTRAVENOUS AS NEEDED
Status: DISCONTINUED | OUTPATIENT
Start: 2022-08-29 | End: 2022-08-29 | Stop reason: SURG

## 2022-08-29 RX ORDER — FAMOTIDINE 10 MG/ML
20 INJECTION, SOLUTION INTRAVENOUS ONCE
Status: COMPLETED | OUTPATIENT
Start: 2022-08-29 | End: 2022-08-29

## 2022-08-29 RX ORDER — DIPHENHYDRAMINE HYDROCHLORIDE 50 MG/ML
12.5 INJECTION INTRAMUSCULAR; INTRAVENOUS
Status: DISCONTINUED | OUTPATIENT
Start: 2022-08-29 | End: 2022-08-29 | Stop reason: HOSPADM

## 2022-08-29 RX ORDER — LIDOCAINE HYDROCHLORIDE 20 MG/ML
INJECTION, SOLUTION INFILTRATION; PERINEURAL AS NEEDED
Status: DISCONTINUED | OUTPATIENT
Start: 2022-08-29 | End: 2022-08-29 | Stop reason: SURG

## 2022-08-29 RX ORDER — OXYCODONE HYDROCHLORIDE AND ACETAMINOPHEN 5; 325 MG/1; MG/1
1-2 TABLET ORAL EVERY 4 HOURS PRN
Qty: 10 TABLET | Refills: 0 | Status: SHIPPED | OUTPATIENT
Start: 2022-08-29 | End: 2022-09-14

## 2022-08-29 RX ORDER — FLUMAZENIL 0.1 MG/ML
0.2 INJECTION INTRAVENOUS AS NEEDED
Status: DISCONTINUED | OUTPATIENT
Start: 2022-08-29 | End: 2022-08-29 | Stop reason: HOSPADM

## 2022-08-29 RX ORDER — GLYCOPYRROLATE 0.2 MG/ML
INJECTION INTRAMUSCULAR; INTRAVENOUS AS NEEDED
Status: DISCONTINUED | OUTPATIENT
Start: 2022-08-29 | End: 2022-08-29 | Stop reason: SURG

## 2022-08-29 RX ORDER — SODIUM CHLORIDE 0.9 % (FLUSH) 0.9 %
3 SYRINGE (ML) INJECTION EVERY 12 HOURS SCHEDULED
Status: DISCONTINUED | OUTPATIENT
Start: 2022-08-29 | End: 2022-08-29 | Stop reason: HOSPADM

## 2022-08-29 RX ORDER — ONDANSETRON 2 MG/ML
4 INJECTION INTRAMUSCULAR; INTRAVENOUS ONCE AS NEEDED
Status: COMPLETED | OUTPATIENT
Start: 2022-08-29 | End: 2022-08-29

## 2022-08-29 RX ORDER — SODIUM CHLORIDE, SODIUM LACTATE, POTASSIUM CHLORIDE, CALCIUM CHLORIDE 600; 310; 30; 20 MG/100ML; MG/100ML; MG/100ML; MG/100ML
9 INJECTION, SOLUTION INTRAVENOUS CONTINUOUS
Status: DISCONTINUED | OUTPATIENT
Start: 2022-08-29 | End: 2022-08-29 | Stop reason: HOSPADM

## 2022-08-29 RX ORDER — HYDRALAZINE HYDROCHLORIDE 20 MG/ML
5 INJECTION INTRAMUSCULAR; INTRAVENOUS
Status: DISCONTINUED | OUTPATIENT
Start: 2022-08-29 | End: 2022-08-29 | Stop reason: HOSPADM

## 2022-08-29 RX ORDER — FENTANYL CITRATE 50 UG/ML
INJECTION, SOLUTION INTRAMUSCULAR; INTRAVENOUS AS NEEDED
Status: DISCONTINUED | OUTPATIENT
Start: 2022-08-29 | End: 2022-08-29 | Stop reason: SURG

## 2022-08-29 RX ORDER — KETOROLAC TROMETHAMINE 30 MG/ML
INJECTION, SOLUTION INTRAMUSCULAR; INTRAVENOUS AS NEEDED
Status: DISCONTINUED | OUTPATIENT
Start: 2022-08-29 | End: 2022-08-29 | Stop reason: SURG

## 2022-08-29 RX ORDER — HYDROCODONE BITARTRATE AND ACETAMINOPHEN 7.5; 325 MG/1; MG/1
1 TABLET ORAL ONCE AS NEEDED
Status: DISCONTINUED | OUTPATIENT
Start: 2022-08-29 | End: 2022-08-29 | Stop reason: HOSPADM

## 2022-08-29 RX ORDER — ROCURONIUM BROMIDE 10 MG/ML
INJECTION, SOLUTION INTRAVENOUS AS NEEDED
Status: DISCONTINUED | OUTPATIENT
Start: 2022-08-29 | End: 2022-08-29 | Stop reason: SURG

## 2022-08-29 RX ORDER — SODIUM CHLORIDE 0.9 % (FLUSH) 0.9 %
3-10 SYRINGE (ML) INJECTION AS NEEDED
Status: DISCONTINUED | OUTPATIENT
Start: 2022-08-29 | End: 2022-08-29 | Stop reason: HOSPADM

## 2022-08-29 RX ORDER — HYDROMORPHONE HYDROCHLORIDE 1 MG/ML
0.5 INJECTION, SOLUTION INTRAMUSCULAR; INTRAVENOUS; SUBCUTANEOUS
Status: DISCONTINUED | OUTPATIENT
Start: 2022-08-29 | End: 2022-08-29 | Stop reason: HOSPADM

## 2022-08-29 RX ORDER — SCOLOPAMINE TRANSDERMAL SYSTEM 1 MG/1
1 PATCH, EXTENDED RELEASE TRANSDERMAL ONCE
Status: DISCONTINUED | OUTPATIENT
Start: 2022-08-29 | End: 2022-08-29 | Stop reason: HOSPADM

## 2022-08-29 RX ORDER — DEXAMETHASONE SODIUM PHOSPHATE 10 MG/ML
INJECTION INTRAMUSCULAR; INTRAVENOUS AS NEEDED
Status: DISCONTINUED | OUTPATIENT
Start: 2022-08-29 | End: 2022-08-29 | Stop reason: SURG

## 2022-08-29 RX ORDER — EPHEDRINE SULFATE 50 MG/ML
5 INJECTION, SOLUTION INTRAVENOUS ONCE AS NEEDED
Status: DISCONTINUED | OUTPATIENT
Start: 2022-08-29 | End: 2022-08-29 | Stop reason: HOSPADM

## 2022-08-29 RX ORDER — PROMETHAZINE HYDROCHLORIDE 25 MG/1
25 TABLET ORAL ONCE AS NEEDED
Status: COMPLETED | OUTPATIENT
Start: 2022-08-29 | End: 2022-08-29

## 2022-08-29 RX ORDER — FENTANYL CITRATE 50 UG/ML
50 INJECTION, SOLUTION INTRAMUSCULAR; INTRAVENOUS
Status: DISCONTINUED | OUTPATIENT
Start: 2022-08-29 | End: 2022-08-29 | Stop reason: HOSPADM

## 2022-08-29 RX ORDER — DIPHENHYDRAMINE HCL 25 MG
25 CAPSULE ORAL
Status: DISCONTINUED | OUTPATIENT
Start: 2022-08-29 | End: 2022-08-29 | Stop reason: HOSPADM

## 2022-08-29 RX ORDER — SUCCINYLCHOLINE CHLORIDE 20 MG/ML
INJECTION INTRAMUSCULAR; INTRAVENOUS AS NEEDED
Status: DISCONTINUED | OUTPATIENT
Start: 2022-08-29 | End: 2022-08-29 | Stop reason: SURG

## 2022-08-29 RX ORDER — LIDOCAINE HYDROCHLORIDE 10 MG/ML
0.5 INJECTION, SOLUTION EPIDURAL; INFILTRATION; INTRACAUDAL; PERINEURAL ONCE AS NEEDED
Status: DISCONTINUED | OUTPATIENT
Start: 2022-08-29 | End: 2022-08-29 | Stop reason: HOSPADM

## 2022-08-29 RX ORDER — NALOXONE HCL 0.4 MG/ML
0.2 VIAL (ML) INJECTION AS NEEDED
Status: DISCONTINUED | OUTPATIENT
Start: 2022-08-29 | End: 2022-08-29 | Stop reason: HOSPADM

## 2022-08-29 RX ORDER — IBUPROFEN 600 MG/1
600 TABLET ORAL ONCE AS NEEDED
Status: DISCONTINUED | OUTPATIENT
Start: 2022-08-29 | End: 2022-08-29 | Stop reason: HOSPADM

## 2022-08-29 RX ORDER — NEOSTIGMINE METHYLSULFATE 0.5 MG/ML
INJECTION, SOLUTION INTRAVENOUS AS NEEDED
Status: DISCONTINUED | OUTPATIENT
Start: 2022-08-29 | End: 2022-08-29 | Stop reason: SURG

## 2022-08-29 RX ORDER — LABETALOL HYDROCHLORIDE 5 MG/ML
5 INJECTION, SOLUTION INTRAVENOUS
Status: DISCONTINUED | OUTPATIENT
Start: 2022-08-29 | End: 2022-08-29 | Stop reason: HOSPADM

## 2022-08-29 RX ORDER — IBUPROFEN 800 MG/1
800 TABLET ORAL EVERY 8 HOURS PRN
Qty: 40 TABLET | Refills: 2 | Status: SHIPPED | OUTPATIENT
Start: 2022-08-29 | End: 2022-09-14

## 2022-08-29 RX ORDER — BUPIVACAINE HYDROCHLORIDE AND EPINEPHRINE 5; 5 MG/ML; UG/ML
INJECTION, SOLUTION EPIDURAL; INTRACAUDAL; PERINEURAL AS NEEDED
Status: DISCONTINUED | OUTPATIENT
Start: 2022-08-29 | End: 2022-08-29 | Stop reason: HOSPADM

## 2022-08-29 RX ORDER — OXYCODONE AND ACETAMINOPHEN 7.5; 325 MG/1; MG/1
1 TABLET ORAL EVERY 4 HOURS PRN
Status: DISCONTINUED | OUTPATIENT
Start: 2022-08-29 | End: 2022-08-29 | Stop reason: HOSPADM

## 2022-08-29 RX ORDER — PROPOFOL 10 MG/ML
VIAL (ML) INTRAVENOUS AS NEEDED
Status: DISCONTINUED | OUTPATIENT
Start: 2022-08-29 | End: 2022-08-29 | Stop reason: SURG

## 2022-08-29 RX ORDER — PROMETHAZINE HYDROCHLORIDE 25 MG/1
25 SUPPOSITORY RECTAL ONCE AS NEEDED
Status: COMPLETED | OUTPATIENT
Start: 2022-08-29 | End: 2022-08-29

## 2022-08-29 RX ORDER — MIDAZOLAM HYDROCHLORIDE 1 MG/ML
1 INJECTION INTRAMUSCULAR; INTRAVENOUS
Status: COMPLETED | OUTPATIENT
Start: 2022-08-29 | End: 2022-08-29

## 2022-08-29 RX ORDER — HYDROMORPHONE HCL 110MG/55ML
PATIENT CONTROLLED ANALGESIA SYRINGE INTRAVENOUS AS NEEDED
Status: DISCONTINUED | OUTPATIENT
Start: 2022-08-29 | End: 2022-08-29 | Stop reason: SURG

## 2022-08-29 RX ORDER — SODIUM CHLORIDE 9 MG/ML
INJECTION, SOLUTION INTRAVENOUS AS NEEDED
Status: DISCONTINUED | OUTPATIENT
Start: 2022-08-29 | End: 2022-08-29 | Stop reason: HOSPADM

## 2022-08-29 RX ADMIN — HYDROMORPHONE HYDROCHLORIDE 0.5 MG: 2 INJECTION, SOLUTION INTRAMUSCULAR; INTRAVENOUS; SUBCUTANEOUS at 12:46

## 2022-08-29 RX ADMIN — PROPOFOL 200 MG: 10 INJECTION, EMULSION INTRAVENOUS at 12:05

## 2022-08-29 RX ADMIN — OXYCODONE HYDROCHLORIDE AND ACETAMINOPHEN 1 TABLET: 7.5; 325 TABLET ORAL at 13:40

## 2022-08-29 RX ADMIN — SUCCINYLCHOLINE CHLORIDE 140 MG: 20 INJECTION, SOLUTION INTRAMUSCULAR; INTRAVENOUS; PARENTERAL at 12:05

## 2022-08-29 RX ADMIN — ONDANSETRON 4 MG: 2 INJECTION INTRAMUSCULAR; INTRAVENOUS at 12:44

## 2022-08-29 RX ADMIN — MIDAZOLAM HYDROCHLORIDE 1 MG: 1 INJECTION, SOLUTION INTRAMUSCULAR; INTRAVENOUS at 11:00

## 2022-08-29 RX ADMIN — ONDANSETRON 4 MG: 2 INJECTION INTRAMUSCULAR; INTRAVENOUS at 13:06

## 2022-08-29 RX ADMIN — GLYCOPYRROLATE 0.6 MG: 0.2 INJECTION INTRAMUSCULAR; INTRAVENOUS at 12:45

## 2022-08-29 RX ADMIN — KETOROLAC TROMETHAMINE 30 MG: 30 INJECTION, SOLUTION INTRAMUSCULAR at 12:44

## 2022-08-29 RX ADMIN — SODIUM CHLORIDE, POTASSIUM CHLORIDE, SODIUM LACTATE AND CALCIUM CHLORIDE 9 ML/HR: 600; 310; 30; 20 INJECTION, SOLUTION INTRAVENOUS at 10:18

## 2022-08-29 RX ADMIN — NEOSTIGMINE METHYLSULFATE 3 MG: 0.5 INJECTION INTRAVENOUS at 12:45

## 2022-08-29 RX ADMIN — LIDOCAINE HYDROCHLORIDE 40 MG: 20 INJECTION, SOLUTION INFILTRATION; PERINEURAL at 12:05

## 2022-08-29 RX ADMIN — FAMOTIDINE 20 MG: 10 INJECTION INTRAVENOUS at 10:39

## 2022-08-29 RX ADMIN — HYDROMORPHONE HYDROCHLORIDE 0.5 MG: 1 INJECTION, SOLUTION INTRAMUSCULAR; INTRAVENOUS; SUBCUTANEOUS at 13:25

## 2022-08-29 RX ADMIN — FENTANYL CITRATE 50 MCG: 50 INJECTION INTRAMUSCULAR; INTRAVENOUS at 13:06

## 2022-08-29 RX ADMIN — DEXAMETHASONE SODIUM PHOSPHATE 4 MG: 10 INJECTION INTRAMUSCULAR; INTRAVENOUS at 12:15

## 2022-08-29 RX ADMIN — PROMETHAZINE HYDROCHLORIDE 25 MG: 25 TABLET ORAL at 15:07

## 2022-08-29 RX ADMIN — ROCURONIUM BROMIDE 30 MG: 50 INJECTION INTRAVENOUS at 12:20

## 2022-08-29 RX ADMIN — SCOPALAMINE 1 PATCH: 1 PATCH, EXTENDED RELEASE TRANSDERMAL at 10:39

## 2022-08-29 RX ADMIN — FENTANYL CITRATE 100 MCG: 50 INJECTION INTRAMUSCULAR; INTRAVENOUS at 12:15

## 2022-08-29 RX ADMIN — HYDROMORPHONE HYDROCHLORIDE 0.5 MG: 1 INJECTION, SOLUTION INTRAMUSCULAR; INTRAVENOUS; SUBCUTANEOUS at 13:44

## 2022-08-29 RX ADMIN — MIDAZOLAM HYDROCHLORIDE 1 MG: 1 INJECTION, SOLUTION INTRAMUSCULAR; INTRAVENOUS at 10:39

## 2022-08-29 NOTE — OP NOTE
DIAGNOSTIC LAPAROSCOPY  Procedure Report    Patient Name:  Rhina Marquez  YOB: 1995    Date of Surgery:  8/29/2022     Indications:  Undesired fertility     Pre-op Diagnosis:   Request for sterilization [Z30.2]       Post-Op Diagnosis Codes:     * Request for sterilization [Z30.2]    Procedure/CPT® Codes:  SALPINGECTOMY LAPAROSCOPIC - bilateral     Staff:  Surgeon(s):  Carlo Sanderson MD      Anesthesia: General    Estimated Blood Loss: minimal    Implants:    Nothing was implanted during the procedure    Specimen:          Specimens     ID Source Type Tests Collected By Collected At Frozen?    A Fallopian Tubes, Bilateral Tissue · TISSUE PATHOLOGY EXAM   Carlo Sanderson MD 8/29/22 1231         Findings: Normal anatomy     Complications: None were noted     Description of Procedure:   Patient is taken to the operating room and placed in supine position.  General anesthesia is given and the patient's legs are placed in Nathan stirrups.  The patient  is prepped and draped in the usual sterile fashion. The surgical time-out is performed for the procedure.     Attention is first turned vaginally, where a speculum is inserted; the cervix is grasped anteriorly with a single-tooth tenaculum, and a hulka tenaculum is placed in the uterus without difficulty. Attention is turned abdominally. A small vertical infraumbilical incision is made with the knife. The Veress needle is placed through the incision. The abdomen is insufflated with CO2 gas.  Elevated pressures were noted while insufflating so direct insertion was made with the 5 mm trocar under visualization after OGT was placed.   When an adequate pneumoperitoneum is achieved, a 5 mm bladeless trocar is inserted through the incision and the patient is placed in Trendelenburg.     General survey of the abdomen provides a normal-appearing uterus, tubes, ovaries. The anterior/posterior cul-de-sac are free of pathology or adhesive disease. Upper abdomen  shows no evidence of pathology.  Under direct visualization 5 mm bladeless trocars were inserted in the left and right lower quadrant after appropriate skin incisions were made.      Right tube was elevated and the length of the mesosalpinx was clamp, cauterized and transected in serial bites using the harmonic scapel.  The tube was amputated at the cornua.   Identical procedure was completed on the contralateral side.     The operative site is hemostatic. The abdomen is deflated. The ports are removed and injected with 1/2% marcaine with epi.    The patient is awakened and taken to recovery room in stable condition.       Carlo Sanderson MD     Date: 8/29/2022  Time: 12:54 EDT

## 2022-08-29 NOTE — PERIOPERATIVE NURSING NOTE
Pt had ongoing nausea (no vomiting) and dizziness with sitting upright.  at bedside noted that last time pt received Fentanyl she was similarly nauseated and dizzy. Added Fentanyl intolerance to allergy list. VSS remained stable. PO Phenergan given, cool cloth applied. Finally able to remain sitting, and eventually ambulate to the BR without incident. Assisted pt to restroom and with dressing. Able to ambulate to  without difficulty.

## 2022-08-29 NOTE — DISCHARGE INSTRUCTIONS
HOW DO I REST MY PELVIS?  For as long as told by your health care provider:  Do not have sex, sexual stimulation, or an orgasm.  Do not use tampons. Do not douche. Do not put anything in your vagina.  Avoid activities that take a lot of effort (are strenuous).  Avoid any activity in which your pelvic muscles could become strained.         Scopolamine Patch  This patch has been applied to the skin behind one of your ears.  It may stay in place up to 24 hours. You may remove it at any time after your surgery; however, it should be removed after you are up and walking around the next day.  This medicine reduces stomach upset. Side effects may include: dry mouth, dizziness, sleepiness, constipation, or upset stomach.  An allergy would show up as: a rash, itching, wheezing or shortness of breath.  Follow these instructions:  Do not drink alcohol, drive or operate machinery while taking this medicine.  Wear only 1 patch at a time. You can leave the patch on for up to 24 hours.  When you remove the patch, fold it in half with the sticky sides together and throw it away. Wash your hands and the area under the patch.  Do not touch your eye with your hand if it has touched the patch.  Wash your hands well before and after touching the patch.  Sit or stand slowly to avoid dizziness.  Call your doctor if you have:  Any sign of allergy  No relief  Trouble passing urine  Any new or severe symptoms

## 2022-08-29 NOTE — ANESTHESIA POSTPROCEDURE EVALUATION
Patient: Rhina Marquez    Procedure Summary     Date: 08/29/22 Room / Location: [facility] OR  / [facility] MAIN OR    Anesthesia Start: 1157 Anesthesia Stop: 1304    Procedure: SALPINGECTOMY LAPAROSCOPIC (Bilateral Abdomen) Diagnosis:       Request for sterilization      (Request for sterilization [Z30.2])    Surgeons: Carlo Sanderson MD Provider: Luis Alberto Hennessy MD    Anesthesia Type: general ASA Status: 2          Anesthesia Type: general    Vitals  Vitals Value Taken Time   /85 08/29/22 1330   Temp 36.5 °C (97.7 °F) 08/29/22 1302   Pulse 58 08/29/22 1330   Resp 16 08/29/22 1330   SpO2 98 % 08/29/22 1330           Post Anesthesia Care and Evaluation    Patient location during evaluation: PACU  Patient participation: complete - patient participated  Level of consciousness: awake and alert  Pain management: adequate    Airway patency: patent  Anesthetic complications: No anesthetic complications    Cardiovascular status: acceptable  Respiratory status: acceptable  Hydration status: acceptable    Comments: --------------------            08/29/22               1405     --------------------   BP:       138/95     Pulse:               Resp:                Temp:                SpO2:      98%      --------------------

## 2022-08-29 NOTE — ANESTHESIA PROCEDURE NOTES
Airway  Urgency: elective    Date/Time: 8/29/2022 12:07 PM  Airway not difficult    General Information and Staff    Patient location during procedure: OR  Anesthesiologist: Luis Alberto Hennessy MD    Indications and Patient Condition  Indications for airway management: airway protection    Preoxygenated: yes  MILS maintained throughout  Mask difficulty assessment: 2 - vent by mask + OA or adjuvant +/- NMBA    Final Airway Details  Final airway type: endotracheal airway      Successful airway: ETT  Cuffed: yes   Successful intubation technique: direct laryngoscopy  Endotracheal tube insertion site: oral  Blade: Ara  Blade size: 3  ETT size (mm): 7.0  Cormack-Lehane Classification: grade IIa - partial view of glottis  Placement verified by: chest auscultation   Measured from: lips  ETT/EBT  to lips (cm): 20  Number of attempts at approach: 1  Assessment: lips, teeth, and gum same as pre-op and atraumatic intubation

## 2022-08-29 NOTE — H&P
"[facility]   Obstetrics and Gynecology   History & Physical    2022    Patient: Rhina Marquez          MR#:[id number]    Chief complaint:  Undesired future fertility     Subjective     27 y.o. female  s/p  vaginal delivery earlier this year with request for sterilization.     Patient Active Problem List   Diagnosis   • Pituitary gland enlarged (HCC)   • Susceptible to varicella (non-immune), currently pregnant   • History of varicella vaccination   • Request for sterilization       Past Medical History:   Diagnosis Date   • Anxiety    • Asthma    • Chronic hypertension    • Depression     hx when younger   • Eye infection    • Generalized convulsive seizures (HCC) 2016    Patient has seen a neurologist and he thought that these were anxiety related episodes and requested the patient see a cardiologist.  She only has this\" seizure-like activity\" during her pregnancies.   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  Affect on future generations   • Pre-eclampsia in postpartum period 2018   • Preeclampsia    • Previous  delivery, antepartum 2020   • Psychogenic nonepileptic seizure     Neurologic workup to date is inconclusive for seizure disorder, pt did not follow through with EEG, saw neurologist Dr. Willam Calderon       Past " Surgical History:   Procedure Laterality Date   • EAR TUBES         Obstetric History:  OB History        6    Para   5    Term   2       3    AB   1    Living   5       SAB   1    IAB   0    Ectopic   0    Molar   0    Multiple   0    Live Births   5               Menstrual History:     No LMP recorded.       # 1 - Date: , Sex: None, Weight: None, GA: None, Delivery: None, Apgar1: None, Apgar5: None, Living: None, Birth Comments: None    # 2 - Date: 11/05/15, Sex: Female, Weight: 2906 g (6 lb 6.5 oz), GA: 37w0d, Delivery: Vaginal, Spontaneous, Apgar1: 8, Apgar5: 9, Living: Living, Birth Comments: Delivery note reviewed.  No complications HB    # 3 - Date: 16, Sex: Female, Weight: 3756 g (8 lb 4.5 oz), GA: 37w0d, Delivery: Vaginal, Spontaneous, Apgar1: 9, Apgar5: 9, Living: Living, Birth Comments: delivery note reviewed - no [facility]     # 4 - Date: 18, Sex: Male, Weight: 3162 g (6 lb 15.5 oz), GA: 34w6d, Delivery: Vaginal, Spontaneous, Apgar1: 8, Apgar5: 9, Living: Living, Birth Comments: del note reviewed - no comps -[facility]      # 5 - Date: 21, Sex: Male, Weight: None, GA: 34w2d, Delivery: Vaginal, Spontaneous, Apgar1: None, Apgar5: None, Living: Living, Birth Comments: None    # 6 - Date: 06/10/22, Sex: Male, Weight: 2760 g (6 lb 1.4 oz), GA: 34w3d, Delivery: Vaginal, Spontaneous, Apgar1: 7, Apgar5: 8, Living: Living, Birth Comments: RM 6      Family History   Problem Relation Age of Onset   • Hypertension Father    • Cancer Maternal Grandmother    • Stroke Maternal Grandfather    • Diabetes Maternal Grandfather    • Migraines Maternal Grandfather    • Dementia Maternal Grandfather    • Stroke Maternal Aunt    • Diabetes Maternal Uncle    • Migraines Mother    • Arthritis Mother    • No Known Problems Brother    • No Known Problems Son    • No Known Problems Daughter    • No Known Problems Daughter    • Breast cancer Neg Hx    • Ovarian cancer Neg Hx    • Uterine cancer Neg Hx     • Colon cancer Neg Hx        Social History     Tobacco Use   • Smoking status: Former Smoker     Types: Cigarettes     Quit date: 05/2019     Years since quitting: 3.3   • Smokeless tobacco: Never Used   Vaping Use   • Vaping Use: Never used   Substance Use Topics   • Alcohol use: No   • Drug use: No       Patient has no known allergies.    No current facility-administered medications for this encounter.    Current Outpatient Medications:   •  ibuprofen (ADVIL,MOTRIN) 600 MG tablet, Take 1 tablet by mouth Every 6 (Six) Hours., Disp: 30 tablet, Rfl: 1  •  ibuprofen (ADVIL,MOTRIN) 600 MG tablet, Take 1 tablet by mouth Every 6 (Six) Hours As Needed for Mild Pain ., Disp: 30 tablet, Rfl: 1  •  metoprolol succinate XL (Toprol XL) 50 MG 24 hr tablet, Take 1 tablet by mouth Daily., Disp: 30 tablet, Rfl: 6  •  ondansetron ODT (ZOFRAN-ODT) 4 MG disintegrating tablet, , Disp: , Rfl:   •  PRENATAL GUMMY VITAMIN, Chew 1 each Daily., Disp: , Rfl:     Review of Systems  Review of Systems   Constitutional: Negative.    Respiratory: Negative.    Cardiovascular: Negative.    Gastrointestinal: Negative.    Genitourinary: Negative.    Psychiatric/Behavioral: Negative.        Objective     Vital Signs  BP: ()/()   Arterial Line BP: ()/()     Physical Exam:  Physical Exam  Vitals and nursing note reviewed.   Constitutional:       Appearance: She is well-developed.   HENT:      Head: Normocephalic and atraumatic.   Cardiovascular:      Rate and Rhythm: Normal rate.   Pulmonary:      Effort: Pulmonary effort is normal.   Abdominal:      General: Bowel sounds are normal. There is no distension.      Palpations: Abdomen is soft.      Tenderness: There is no abdominal tenderness.   Skin:     General: Skin is warm and dry.   Neurological:      Mental Status: She is alert and oriented to person, place, and time.   Psychiatric:         Behavior: Behavior normal.         Thought Content: Thought content normal.         Judgment: Judgment  normal.         Labs:          Hospital problem list:    Request for sterilization      Assessment & Plan     1. Request for sterilization       Plan:  Laparosocopic bilateral salpingectomy    [Sterilization procedure]:  The surgical procedure was reviewed with the patient.  I discussed the risks of the surgical procedure including but not limited to the risks of bleeding, infection, and damage to internal organs.  Generally speaking with all forms of sterilization, there is a failure rate of 1/300.  With failures, there is an increased risk of ectopic gestation.  Ruptured ectopic gestations can be life threatening if not treated.      With bilateral salpingectomy, risk of sterilization failure is reduced significantly.  As well the patient may see a reduce life time risk of cancers that originate from the fallopian tubes.     I discussed for all practical purposes the procedure should be considered permanent and non-reversal.  If there is not complete certainty regarding tubal ligation, long acting reversible contraception should be considered.  Alternatives were discussed.      Carlo Sanderson MD  08/28/22  20:26 EDT      Patient Care Team:  Aneesh Coto MD as PCP - General  Aneesh Coto MD as PCP - Family Medicine

## 2022-08-29 NOTE — ANESTHESIA PREPROCEDURE EVALUATION
Anesthesia Evaluation     Patient summary reviewed and Nursing notes reviewed                Airway   Mallampati: II  TM distance: >3 FB  Neck ROM: full  Dental      Pulmonary    (+) a smoker Former, asthma,  Cardiovascular     Rhythm: regular  Rate: normal    (+) hypertension,       Neuro/Psych  (+) psychiatric history Anxiety and Depression,    GI/Hepatic/Renal/Endo - negative ROS     Musculoskeletal (-) negative ROS    Abdominal    Substance History - negative use     OB/GYN negative ob/gyn ROS         Other                        Anesthesia Plan    ASA 2     general     (Asthma  BMI    I have reviewed the patient's history with the patient and the chart, including all pertinent laboratory results and imaging. I have explained the risks of anesthesia including but not limited to dental damage, corneal abrasion, nerve injury, MI, stroke, and death. Questions asked and answered. Anesthetic plan discussed with patient and team as indicated. Patient expressed understanding of the above.  )  intravenous induction     Anesthetic plan, risks, benefits, and alternatives have been provided, discussed and informed consent has been obtained with: patient.        CODE STATUS:

## 2022-08-30 LAB
LAB AP CASE REPORT: NORMAL
PATH REPORT.FINAL DX SPEC: NORMAL
PATH REPORT.GROSS SPEC: NORMAL

## 2022-09-01 ENCOUNTER — TELEPHONE (OUTPATIENT)
Dept: OBSTETRICS AND GYNECOLOGY | Age: 27
End: 2022-09-01

## 2022-09-01 NOTE — TELEPHONE ENCOUNTER
Pt's significant other called back to give an updated BP reading of 172/114. Pt feels like she is on the verge of a seizure. I spoke to Dr. Sanderson and he advised that pt come to office and let us do a BP reading. If pt can not do that, she needs to go to the ER. I called Tommie Claire (significant other) back and instructed him on what Dr. Sanderson advised. Tommie stated that she could not come to office as she is home alone with the kids and no transportation. Tommie voiced understanding that she should then go to the ER to be evaluated.

## 2022-09-01 NOTE — TELEPHONE ENCOUNTER
Patient's  called (patient is having trouble with her phone currently) and he states since her surgery on August 29, 2022 her blood pressure has been elevated.  He states she is currently on Toprol 50MG and this is not getting her blood pressure down.  He states they are running around 152/106. Please advise. [facility]

## 2022-09-14 ENCOUNTER — OFFICE VISIT (OUTPATIENT)
Dept: OBSTETRICS AND GYNECOLOGY | Age: 27
End: 2022-09-14

## 2022-09-14 VITALS
DIASTOLIC BLOOD PRESSURE: 94 MMHG | SYSTOLIC BLOOD PRESSURE: 146 MMHG | BODY MASS INDEX: 41.04 KG/M2 | HEIGHT: 62 IN | WEIGHT: 223 LBS

## 2022-09-14 DIAGNOSIS — I10 ESSENTIAL HYPERTENSION: ICD-10-CM

## 2022-09-14 DIAGNOSIS — Z09 POSTOP CHECK: Primary | ICD-10-CM

## 2022-09-14 PROCEDURE — 99024 POSTOP FOLLOW-UP VISIT: CPT | Performed by: OBSTETRICS & GYNECOLOGY

## 2022-09-14 RX ORDER — AMLODIPINE BESYLATE 5 MG/1
5 TABLET ORAL DAILY
Qty: 30 TABLET | Refills: 4 | Status: SHIPPED | OUTPATIENT
Start: 2022-09-14

## 2022-09-14 NOTE — PROGRESS NOTES
"[facility]   Obstetrics and Gynecology   Postop Visit    2022    Patient: Rhina Marquez          MR#:[id number]    History of Present Illness    Chief Complaint   Patient presents with   • Post-op     2wks PO salpingectomy; was having issues with high BP 22 and was advised to go to ER but never went. Pt states her BP has continued to be high so she has stopped checking it the last couple of days. She reports the highest BP was around 170's/120's. She states last night in the middle of the night her body started \"pulsing like a seizure\" so she knew something was wrong with her BP.       27 y.o. female  status post uncomplicated bilateral salpingectomy who reports postoperative problems with elevated blood pressure.    The elevations recorded above were taken with a home cuff that has not been validated in the office.       ________________________________________  Patient Active Problem List   Diagnosis   • Pituitary gland enlarged (HCC)   • Susceptible to varicella (non-immune), currently pregnant   • History of varicella vaccination   • Request for sterilization   • S/P laparoscopy       Past Medical History:   Diagnosis Date   • Anxiety    • Asthma    • Chronic hypertension    • Depression     hx when younger   • Eye infection    • Generalized convulsive seizures (HCC) 2016    Not true seizures. Patient has seen a neurologist and he thought that these were anxiety related episodes and requested the patient see a cardiologist.  She only has this\" seizure-like activity\" during her pregnancies.   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  Affect on future generations   • Pre-eclampsia in postpartum period 2018   • Preeclampsia    • Previous  delivery, antepartum 2020   • Psychogenic nonepileptic seizure     Neurologic workup to date is inconclusive for seizure disorder, pt did not follow through with EEG, saw neurologist Dr. Willam Calderon       Past Surgical History:   Procedure Laterality Date   • DIAGNOSTIC LAPAROSCOPY Bilateral 2022    Procedure: SALPINGECTOMY LAPAROSCOPIC;  Surgeon: Carlo Sanderson MD;  Location: [facility];  Service: Obstetrics/Gynecology;  Laterality: Bilateral;   • EAR TUBES         Social History     Tobacco Use   Smoking Status Former Smoker   • Types: Cigarettes   • Quit date: 2019   • Years since quitting: 3.3   Smokeless Tobacco Never Used       has a current medication list which includes the following prescription(s): amlodipine.  ________________________________________  Review of Systems         Objective       /94   Ht 157.5 cm (62\")   Wt 101 kg (223 lb)   LMP  (LMP Unknown)   Breastfeeding No   BMI 40.79 kg/m²    BP Readings from Last 3 Encounters:   22 146/94   22 153/82   22 132/85      Wt Readings from Last 3 Encounters:   22 101 kg (223 lb)   22 101 kg (221 lb 14.4 oz)   22 99.8 kg (220 lb)      BMI: Estimated body mass index is 40.79 kg/m² as calculated from the following:    Height as of this encounter: 157.5 cm (62\").    Weight as of this encounter: 101 kg (223 lb).    EXAM     General:     Patient appears well in NAD  Abdomen: Soft, NT, +BS, no acute findings  Pelvic:    Incision: Dry, clean, intact healing without signs of " infection, right lower quad inc, open and granulated with no infection evident   Ext:  No cyanosis, edema 1-2    Assessment:    Diagnoses and all orders for this visit:    1. Postop check (Primary)    2. Essential hypertension    Other orders  -     amLODIPine (NORVASC) 5 MG tablet; Take 1 tablet by mouth Daily.  Dispense: 30 tablet; Refill: 4      Blood pressures appear inadequately controlled on beta-blocker.  Beta-blocker is discontinued and new prescription is sent to the patient's pharmacy.  The patient is instructed to record blood pressures 4-5 times a week.  She is instructed to bring her home cuff next visit for validation.    Plan:   Return in about 2 weeks (around 9/28/2022) for BP check with BP Log.      Carlo Sanderson MD  9/14/2022 09:32 EDT

## 2022-12-12 NOTE — PLAN OF CARE
Problem: Skin Injury Risk (Adult)  Goal: Identify Related Risk Factors and Signs and Symptoms  Outcome: Ongoing (interventions implemented as appropriate)   09/15/18 0636   Skin Injury Risk (Adult)   Related Risk Factors (Skin Injury Risk) mobility impaired     Goal: Skin Health and Integrity  Outcome: Ongoing (interventions implemented as appropriate)   09/15/18 0636   Skin Injury Risk (Adult)   Skin Health and Integrity making progress toward outcome       Problem: Fall Risk,  (Adult,Obstetrics,Pediatric)  Goal: Identify Related Risk Factors and Signs and Symptoms  Outcome: Ongoing (interventions implemented as appropriate)   09/15/18 0636   Fall Risk,  (Adult,Obstetrics,Pediatric)   Related Risk Factors (Fall Risk, ) medication side effects   Signs and Symptoms (Fall Risk, ) presence of fall risk factors     Goal: Absence of Maternal Fall  Outcome: Ongoing (interventions implemented as appropriate)    Goal: Absence of  Fall/Drop  Outcome: Ongoing (interventions implemented as appropriate)      Problem: Hypertensive Disorders in Pregnancy (Adult,Obstetrics,Pediatric)  Goal: Signs and Symptoms of Listed Potential Problems Will be Absent, Minimized or Managed (Hypertensive Disorders in Pregnancy)  Outcome: Ongoing (interventions implemented as appropriate)   09/15/18 1641   Goal/Outcome Evaluation   Problems Assessed (Hypertensive Disorders in Pregnancy) all   Problems Present (Hypertensive/in PG) none       Problem: Patient Care Overview  Goal: Individualization and Mutuality  Outcome: Ongoing (interventions implemented as appropriate)   09/15/18 06   Individualization   Patient Specific Preferences pumping - breastpump in room   Patient Specific Goals (Include Timeframe) BPs lower, pt able to rest   Patient Specific Interventions Blood pressure medicine, Tylenol for headaches, quiet environment   Mutuality/Individual Preferences   What Anxieties, Fears, Concerns, or  Questions Do You Have About Your Care? IV sticks - pt has been stuck numeroud times with infiltrated sites   What Information Would Help Us Give You More Personalized Care? baby is 8 days old and in NICU   How Would You and/or Your Support Person Like to Participate in Your Care? be part of discussion with plan of care   Mutuality/Individual Preferences   How to Address Anxieties/Fears discuss plan of care     Goal: Discharge Needs Assessment  Outcome: Ongoing (interventions implemented as appropriate)   09/15/18 0636   Discharge Needs Assessment   Readmission Within the Last 30 Days other (see comments)  (7 days postpartum, BP problems, headache)   Concerns to be Addressed denies needs/concerns at this time   Patient/Family Anticipates Transition to home   Patient/Family Anticipated Services at Transition none   Transportation Concerns car, none   Transportation Anticipated family or friend will provide   Anticipated Changes Related to Illness none   Equipment Needed After Discharge none   Disability   Equipment Currently Used at Home none          18

## 2022-12-19 ENCOUNTER — TRANSCRIBE ORDERS (OUTPATIENT)
Dept: ADMINISTRATIVE | Facility: HOSPITAL | Age: 27
End: 2022-12-19

## 2022-12-19 DIAGNOSIS — E23.6 ENLARGED PITUITARY GLAND: Primary | ICD-10-CM

## 2023-01-25 ENCOUNTER — HOSPITAL ENCOUNTER (OUTPATIENT)
Dept: MRI IMAGING | Facility: HOSPITAL | Age: 28
Discharge: HOME | End: 2023-01-25
Admitting: FAMILY MEDICINE
Payer: COMMERCIAL

## 2023-01-25 DIAGNOSIS — E23.6 ENLARGED PITUITARY GLAND: ICD-10-CM

## 2023-01-25 PROCEDURE — A9577 INJ MULTIHANCE: HCPCS | Performed by: FAMILY MEDICINE

## 2023-01-25 PROCEDURE — 0 GADOBENATE DIMEGLUMINE 529 MG/ML SOLUTION: Performed by: FAMILY MEDICINE

## 2023-01-25 PROCEDURE — 82565 ASSAY OF CREATININE: CPT

## 2023-01-25 PROCEDURE — 70553 MRI BRAIN STEM W/O & W/DYE: CPT

## 2023-01-25 RX ADMIN — GADOBENATE DIMEGLUMINE 20 ML: 529 INJECTION, SOLUTION INTRAVENOUS at 19:11

## 2023-01-27 LAB — CREAT BLDA-MCNC: 0.7 MG/DL (ref 0.6–1.3)

## 2023-01-27 NOTE — TELEPHONE ENCOUNTER
"Patient called and states she is a patient of Dr. Em and is wanting to switch her care to Dr. Sanderson.  When I asked her the reason she states \"I do have a specific reason but its something I prefer not to talk about.\"  "
Fine with me - please let her know if I am on call We cannot prevent that    
LM for pt to cb, appt changed to Wed.dn  
Please advise?  
Total Body Time: 0:42
Consent: Written consent obtained.  The risks were reviewed with the patient including but not limited to: burn, pigmentary changes, pain, blistering, scabbing, redness, increased risk of skin cancers, and the remote possibility of scarring.
Protocol For Photochemotherapy: Tar And Nbuvb (Goeckerman Treatment): The patient received Photochemotherapy: Tar and NBUVB (Goeckerman treatment).
Protocol For Photochemotherapy For Severe Photoresponsive Dermatoses: Petrolatum And Broad Band Uvb: The patient received Photochemotherapyfor severe photoresponsive dermatoses: Petrolatum and Broad Band UVB requiring at least 4 to 8 hours of care under direct physician supervision.
Price (Use Numbers Only, No Special Characters Or $): 25
Protocol For Protocol For Photochemotherapy For Severe Photoresponsive Dermatoses: Bath Puva: The patient received Photochemotherapy for severe photoresponsive dermatoses: Bath PUVA requiring at least 4 to 8 hours of care under direct physician supervision.
Protocol For Photochemotherapy For Severe Photoresponsive Dermatoses: Petrolatum And Nbuvb: The patient received Photochemotherapy for severe photoresponsive dermatoses: Petrolatum and NBUVB requiring at least 4 to 8 hours of care under direct physician supervision.
Protocol For Photochemotherapy: Tar And Broad Band Uvb (Goeckerman Treatment): The patient received Photochemotherapy: Tar and Broad Band UVB (Goeckerman treatment).
Protocol For Puva: The patient received PUVA.
Post-Care Instructions: I reviewed with the patient in detail post-care instructions. Patient is to wear sun protection. Patients may expect sunburn like redness, discomfort and scabbing.
Protocol For Photochemotherapy: Petrolatum And Nbuvb: The patient received Photochemotherapy: Petrolatum and NBUVB (petrolatum applied to all lesions prior to phototherapy).
Protocol: NBUVB
Protocol For Broad Band Uvb: The patient received Broad Band UVB.
Comments: Written consent obtained.  The risks were reviewed with the patient including but not limited to: burn, pigmentary changes, pain, blistering, scabbing, redness, increased risk of skin cancers, and the remote possibility of scarring. The patient will receive NBUVB TIW at a dose of 300.\\nIncrements:  10% if no erythema on previous treatment/ 5% if less than 8 hrs of erythema on previous treatment/0% if erythema persists between 8 and 24 hrs on previous treatment High UVB protocol as per computer\\nMaximum Body Dose:  a maxima of 10,000 mj/cm2 to the body
Protocol For Photochemotherapy: Baby Oil And Nbuvb: The patient received Photochemotherapy: Baby Oil and NBUVB (baby oil applied to all lesions prior to phototherapy).
Protocol For Photochemotherapy: Petrolatum And Broad Band Uvb: The patient received Photochemotherapy: Petrolatum and Broad Band UVB.
Protocol For Photochemotherapy For Severe Photoresponsive Dermatoses: Puva: The patient received Photochemotherapy for severe photoresponsive dermatoses: PUVA requiring at least 4 to 8 hours of care under direct physician supervision.
Name Of Supervising Technician: Luisa BAER MA
Detail Level: Generalized
Total Body Energy: 372 mj/cm2
Protocol For Photochemotherapy For Severe Photoresponsive Dermatoses: Tar And Broad Band Uvb (Goeckerman Treatment): The patient received Photochemotherapy for severe photoresponsive dermatoses: Tar and Broad Band UVB (Goeckerman treatment) requiring at least 4 to 8 hours of care under direct physician supervision.
Protocol For Nbuvb: The patient received NBUVB.
Eye And Genital Shields: yes
Protocol For Photochemotherapy For Severe Photoresponsive Dermatoses: Tar And Nbuvb (Goeckerman Treatment): The patient received Photochemotherapy for severe photoresponsive dermatoses: Tar and NBUVB (Goeckerman treatment) requiring at least 4 to 8 hours of care under direct physician supervision.
Treatment Number: 4
Protocol For Bath Puva: The patient received Bath PUVA.

## 2023-12-06 PROCEDURE — 99284 EMERGENCY DEPT VISIT MOD MDM: CPT

## 2023-12-06 PROCEDURE — 80053 COMPREHEN METABOLIC PANEL: CPT

## 2023-12-06 PROCEDURE — 83735 ASSAY OF MAGNESIUM: CPT | Performed by: PHYSICIAN ASSISTANT

## 2023-12-06 PROCEDURE — 93005 ELECTROCARDIOGRAM TRACING: CPT

## 2023-12-06 PROCEDURE — 85025 COMPLETE CBC W/AUTO DIFF WBC: CPT

## 2023-12-06 PROCEDURE — 36415 COLL VENOUS BLD VENIPUNCTURE: CPT

## 2023-12-06 PROCEDURE — 84484 ASSAY OF TROPONIN QUANT: CPT

## 2023-12-06 PROCEDURE — 93005 ELECTROCARDIOGRAM TRACING: CPT | Performed by: EMERGENCY MEDICINE

## 2023-12-06 RX ORDER — ASPIRIN 325 MG
325 TABLET ORAL ONCE
Status: DISCONTINUED | OUTPATIENT
Start: 2023-12-06 | End: 2023-12-07 | Stop reason: HOSPADM

## 2023-12-06 RX ORDER — SODIUM CHLORIDE 0.9 % (FLUSH) 0.9 %
10 SYRINGE (ML) INJECTION AS NEEDED
Status: DISCONTINUED | OUTPATIENT
Start: 2023-12-06 | End: 2023-12-07 | Stop reason: HOSPADM

## 2023-12-07 ENCOUNTER — HOSPITAL ENCOUNTER (EMERGENCY)
Facility: HOSPITAL | Age: 28
Discharge: HOME | End: 2023-12-07
Attending: EMERGENCY MEDICINE
Payer: COMMERCIAL

## 2023-12-07 ENCOUNTER — APPOINTMENT (OUTPATIENT)
Dept: GENERAL RADIOLOGY | Facility: HOSPITAL | Age: 28
End: 2023-12-07
Payer: COMMERCIAL

## 2023-12-07 VITALS
RESPIRATION RATE: 16 BRPM | HEIGHT: 62 IN | HEART RATE: 81 BPM | TEMPERATURE: 97.7 F | WEIGHT: 235 LBS | OXYGEN SATURATION: 94 % | BODY MASS INDEX: 43.24 KG/M2 | DIASTOLIC BLOOD PRESSURE: 79 MMHG | SYSTOLIC BLOOD PRESSURE: 127 MMHG

## 2023-12-07 DIAGNOSIS — R07.89 ATYPICAL CHEST PAIN: ICD-10-CM

## 2023-12-07 DIAGNOSIS — E87.6 HYPOKALEMIA: ICD-10-CM

## 2023-12-07 DIAGNOSIS — R00.2 PALPITATIONS: Primary | ICD-10-CM

## 2023-12-07 LAB
ALBUMIN SERPL-MCNC: 4.7 G/DL (ref 3.5–5.2)
ALBUMIN/GLOB SERPL: 1.6 G/DL
ALP SERPL-CCNC: 75 U/L (ref 39–117)
ALT SERPL W P-5'-P-CCNC: 24 U/L (ref 1–33)
ANION GAP SERPL CALCULATED.3IONS-SCNC: 11 MMOL/L (ref 5–15)
AST SERPL-CCNC: 20 U/L (ref 1–32)
BASOPHILS # BLD AUTO: 0.07 10*3/MM3 (ref 0–0.2)
BASOPHILS NFR BLD AUTO: 0.7 % (ref 0–1.5)
BILIRUB SERPL-MCNC: 0.3 MG/DL (ref 0–1.2)
BUN SERPL-MCNC: 17 MG/DL (ref 6–20)
BUN/CREAT SERPL: 26.2 (ref 7–25)
CALCIUM SPEC-SCNC: 9.5 MG/DL (ref 8.6–10.5)
CHLORIDE SERPL-SCNC: 101 MMOL/L (ref 98–107)
CO2 SERPL-SCNC: 27 MMOL/L (ref 22–29)
CREAT SERPL-MCNC: 0.65 MG/DL (ref 0.57–1)
DEPRECATED RDW RBC AUTO: 40 FL (ref 37–54)
EGFRCR SERPLBLD CKD-EPI 2021: 124.7 ML/MIN/1.73
EOSINOPHIL # BLD AUTO: 0.24 10*3/MM3 (ref 0–0.4)
EOSINOPHIL NFR BLD AUTO: 2.2 % (ref 0.3–6.2)
ERYTHROCYTE [DISTWIDTH] IN BLOOD BY AUTOMATED COUNT: 13.3 % (ref 12.3–15.4)
GEN 5 2HR TROPONIN T REFLEX: <6 NG/L
GLOBULIN UR ELPH-MCNC: 3 GM/DL
GLUCOSE SERPL-MCNC: 88 MG/DL (ref 65–99)
HCT VFR BLD AUTO: 41.8 % (ref 34–46.6)
HGB BLD-MCNC: 13.7 G/DL (ref 12–15.9)
HOLD SPECIMEN: NORMAL
HOLD SPECIMEN: NORMAL
IMM GRANULOCYTES # BLD AUTO: 0.06 10*3/MM3 (ref 0–0.05)
IMM GRANULOCYTES NFR BLD AUTO: 0.6 % (ref 0–0.5)
LYMPHOCYTES # BLD AUTO: 3.3 10*3/MM3 (ref 0.7–3.1)
LYMPHOCYTES NFR BLD AUTO: 30.9 % (ref 19.6–45.3)
MAGNESIUM SERPL-MCNC: 2.1 MG/DL (ref 1.6–2.6)
MCH RBC QN AUTO: 27.5 PG (ref 26.6–33)
MCHC RBC AUTO-ENTMCNC: 32.8 G/DL (ref 31.5–35.7)
MCV RBC AUTO: 83.9 FL (ref 79–97)
MONOCYTES # BLD AUTO: 0.72 10*3/MM3 (ref 0.1–0.9)
MONOCYTES NFR BLD AUTO: 6.7 % (ref 5–12)
NEUTROPHILS NFR BLD AUTO: 58.9 % (ref 42.7–76)
NEUTROPHILS NFR BLD AUTO: 6.29 10*3/MM3 (ref 1.7–7)
NRBC BLD AUTO-RTO: 0.1 /100 WBC (ref 0–0.2)
PLATELET # BLD AUTO: 400 10*3/MM3 (ref 140–450)
PMV BLD AUTO: 9.8 FL (ref 6–12)
POTASSIUM SERPL-SCNC: 2.8 MMOL/L (ref 3.5–5.2)
PROT SERPL-MCNC: 7.7 G/DL (ref 6–8.5)
QT INTERVAL: 423 MS
QTC INTERVAL: 479 MS
RBC # BLD AUTO: 4.98 10*6/MM3 (ref 3.77–5.28)
SODIUM SERPL-SCNC: 139 MMOL/L (ref 136–145)
TROPONIN T DELTA: NORMAL
TROPONIN T SERPL HS-MCNC: <6 NG/L
WBC NRBC COR # BLD AUTO: 10.68 10*3/MM3 (ref 3.4–10.8)
WHOLE BLOOD HOLD COAG: NORMAL
WHOLE BLOOD HOLD SPECIMEN: NORMAL

## 2023-12-07 PROCEDURE — 84484 ASSAY OF TROPONIN QUANT: CPT | Performed by: EMERGENCY MEDICINE

## 2023-12-07 PROCEDURE — 71045 X-RAY EXAM CHEST 1 VIEW: CPT

## 2023-12-07 RX ORDER — POTASSIUM CHLORIDE 750 MG/1
40 TABLET, FILM COATED, EXTENDED RELEASE ORAL ONCE
Status: COMPLETED | OUTPATIENT
Start: 2023-12-07 | End: 2023-12-07

## 2023-12-07 RX ORDER — DULOXETIN HYDROCHLORIDE 60 MG/1
60 CAPSULE, DELAYED RELEASE ORAL NIGHTLY
COMMUNITY

## 2023-12-07 RX ORDER — POTASSIUM CHLORIDE 750 MG/1
20 TABLET, FILM COATED, EXTENDED RELEASE ORAL DAILY
Qty: 5 TABLET | Refills: 0 | Status: SHIPPED | OUTPATIENT
Start: 2023-12-07 | End: 2023-12-12

## 2023-12-07 RX ADMIN — POTASSIUM CHLORIDE 40 MEQ: 750 TABLET, EXTENDED RELEASE ORAL at 03:09

## 2023-12-07 NOTE — ED NOTES
Pt arrived via pv, states she became dizzy yesterday, almost passed out, went down to knees, left arm went numb and experienced chest pain.  Today the chest pain is mid sternal, with tightness. Denies being SOA.

## 2023-12-07 NOTE — DISCHARGE INSTRUCTIONS
Recommend eating a diet rich in potassium.  This can be found in citrus fruits such as bananas, oranges, mangoes, avocados.  Recommend following up with your family physician in 2 weeks time to ensure your potassium levels are normal.  Continue with your chlorthalidone.  Recommend following up with cardiology group above given your recurrent palpitations.  Return to the ER with recurrent symptoms, worsening symptoms, new symptoms, or should you have any further concerns.  Or

## 2023-12-07 NOTE — ED TRIAGE NOTES
"To ER via PV from home.  C/o dizziness that started yesterday, pt states she fell at that time and \"almost passed out\".  States last night also had CP with radiation to arm and diaphoresis.  C/o mild chest pressure slightly to left at this time with some radiation to left arm.      States HR was 140 on pulse ox at home.   "

## 2023-12-07 NOTE — ED PROVIDER NOTES
EMERGENCY DEPARTMENT ENCOUNTER    Room Number:  08/08  PCP: Aneesh Coto MD      HPI:  Chief Complaint: Chest pain, shortness of breath  A complete HPI/ROS/PMH/PSH/SH/FH are unobtainable due to: Nothing  Context: Rhina Marquez is a 28 y.o. female who presents to the ED c/o chest pain that is intermittent and began yesterday.  Patient states she was in the kitchen standing when the symptoms began.  Pain is said to be substernal and sharp.  It radiated to the left axilla.  Patient informs me she became short of breath with the pain.  She began to experience vertigo falling to the ground.  She denies loss of consciousness.  There was give again nausea and diaphoresis associated with the chest pain.  Patient states her heart rate jumped up into the 40s and she was experiencing palpitations.  See onset pain yesterday the pain has been intermittent.  Patient denies unilateral leg swelling, pedal edema, fever, chills, head injury, recent sick contacts.  She denies pregnancy.  She is here for further evaluation.    CRF's include hypertension.  Patient does have history of coronary artery disease in her family but is typically the male gender in the upper 60s or greater in age.    PAST MEDICAL HISTORY  Active Ambulatory Problems     Diagnosis Date Noted    Pituitary gland enlarged 09/14/2018    Susceptible to varicella (non-immune), currently pregnant 07/27/2020    History of varicella vaccination 12/03/2021    Request for sterilization 05/16/2022    S/P laparoscopy 08/29/2022     Resolved Ambulatory Problems     Diagnosis Date Noted    High risk pregnancy in young multigravida, antepartum 06/22/2016    Maternal history of obstetrical complications 06/22/2016    Maternal varicella, non-immune 06/24/2016    Rubella non-immune status, antepartum 06/24/2016    UTI in pregnancy 06/25/2016    History of pre-eclampsia in prior pregnancy, currently pregnant in third trimester 08/09/2016    Evaluate anatomy not seen on  prior sonogram 2016    Generalized convulsive seizures 2016    Palpitations 2016    Pregnancy 10/20/2016    Large for gestational age (LGA) 2016    Hypertension affecting pregnancy in third trimester 2016    Proteinuria affecting pregnancy in third trimester 2016    Antepartum mild preeclampsia 2016    GBS (group B Streptococcus carrier), +RV culture, currently pregnant 2016    Preeclampsia 2016     (spontaneous vaginal delivery) 2016    Hx of preeclampsia, prior pregnancy, currently pregnant 2018    Headache in pregnancy, antepartum, second trimester 2018    Obesity affecting pregnancy in second trimester 2018    Evaluate anatomy not seen on prior sonogram 2018    Sciatica, left side 2018    Pregnancy 2018    Chronic hypertension affecting pregnancy 2018    Chronic headaches 2018    High-risk pregnancy supervision 2018    Anemia affecting pregnancy 2018    Excessive fetal growth affecting management of pregnancy in third trimester 2018    Pregnant 2018    Elevated blood pressure complicating pregnancy, antepartum, third trimester 2018     labor in third trimester without delivery 2018    Pre-eclampsia in postpartum period 2018    Obesity in pregnancy, antepartum 2020    Previous  delivery, antepartum 2020    Request for sterilization 2020    Carrier of fragile X syndrome 2020    Nausea and vomiting during pregnancy 09/15/2020    Supervision of high risk pregnancy, antepartum 2020    Pregnancy 2021     labor in third trimester without delivery 2021    Vanishing twin syndrome 2021    Genetic screening 2022    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    Compliance poor 2022    Pregnant 2022     labor in third trimester without delivery  2022    Pregnancy 06/10/2022     (normal spontaneous vaginal delivery) 06/10/2022     delivery 06/10/2022    Nuchal cord affecting delivery 06/10/2022    Postpartum hypertension 2022     Past Medical History:   Diagnosis Date    Anxiety     Asthma     Chronic hypertension     Depression     Eye infection 2000    Gestational hypertension     Mild preeclampsia 2016    Psychogenic nonepileptic seizure          PAST SURGICAL HISTORY  Past Surgical History:   Procedure Laterality Date    DIAGNOSTIC LAPAROSCOPY Bilateral 2022    Procedure: SALPINGECTOMY LAPAROSCOPIC;  Surgeon: Carlo Sanderson MD;  Location: [facility] OR;  Service: Obstetrics/Gynecology;  Laterality: Bilateral;    EAR TUBES           FAMILY HISTORY  Family History   Problem Relation Age of Onset    Migraines Mother     Arthritis Mother     Hypertension Father     No Known Problems Brother     No Known Problems Daughter     No Known Problems Daughter     No Known Problems Son     Stroke Maternal Aunt     Diabetes Maternal Uncle     Cancer Maternal Grandmother     Stroke Maternal Grandfather     Diabetes Maternal Grandfather     Migraines Maternal Grandfather     Dementia Maternal Grandfather     Breast cancer Neg Hx     Ovarian cancer Neg Hx     Uterine cancer Neg Hx     Colon cancer Neg Hx     Malig Hyperthermia Neg Hx          SOCIAL HISTORY  Social History     Socioeconomic History    Marital status:      Spouse name: OLEGARIO    Number of children: 2    Years of education: 12   Tobacco Use    Smoking status: Former     Types: Cigarettes     Quit date: 2019     Years since quittin.6    Smokeless tobacco: Never   Vaping Use    Vaping Use: Never used   Substance and Sexual Activity    Alcohol use: No    Drug use: No    Sexual activity: Yes     Partners: Male     Birth control/protection: None     Comment: sigh other = OLEGARIO         ALLERGIES  Fentanyl        REVIEW OF SYSTEMS  Review of Systems     All systems  reviewed and negative except for those discussed in HPI.       PHYSICAL EXAM  ED Triage Vitals   Temp Heart Rate Resp BP SpO2   12/06/23 2317 12/06/23 2317 12/06/23 2317 12/06/23 2340 12/06/23 2317   97.7 °F (36.5 °C) 84 16 138/93 100 %      Temp src Heart Rate Source Patient Position BP Location FiO2 (%)   12/06/23 2317 -- -- -- --   Tympanic           Physical Exam      GENERAL: WDWN female, no acute distress  HENT: nares patent  EYES: no scleral icterus  CV: regular rhythm, normal rate, normal S1-S2, no rubs murmurs gallops, no unilateral leg swelling or pedal edema  RESPIRATORY: normal effort, lungs CTA B  ABDOMEN: soft, nontender  MUSCULOSKELETAL: no deformity  NEURO: alert, moves all extremities, follows commands  PSYCH:  calm, cooperative  SKIN: warm, dry    Vital signs and nursing notes reviewed.          LAB RESULTS  Recent Results (from the past 24 hour(s))   ECG 12 Lead Chest Pain    Collection Time: 12/06/23 11:33 PM   Result Value Ref Range    QT Interval 423 ms    QTC Interval 479 ms   Comprehensive Metabolic Panel    Collection Time: 12/06/23 11:48 PM    Specimen: Blood   Result Value Ref Range    Glucose 88 65 - 99 mg/dL    BUN 17 6 - 20 mg/dL    Creatinine 0.65 0.57 - 1.00 mg/dL    Sodium 139 136 - 145 mmol/L    Potassium 2.8 (L) 3.5 - 5.2 mmol/L    Chloride 101 98 - 107 mmol/L    CO2 27.0 22.0 - 29.0 mmol/L    Calcium 9.5 8.6 - 10.5 mg/dL    Total Protein 7.7 6.0 - 8.5 g/dL    Albumin 4.7 3.5 - 5.2 g/dL    ALT (SGPT) 24 1 - 33 U/L    AST (SGOT) 20 1 - 32 U/L    Alkaline Phosphatase 75 39 - 117 U/L    Total Bilirubin 0.3 0.0 - 1.2 mg/dL    Globulin 3.0 gm/dL    A/G Ratio 1.6 g/dL    BUN/Creatinine Ratio 26.2 (H) 7.0 - 25.0    Anion Gap 11.0 5.0 - 15.0 mmol/L    eGFR 124.7 >60.0 mL/min/1.73   High Sensitivity Troponin T    Collection Time: 12/06/23 11:48 PM    Specimen: Blood   Result Value Ref Range    HS Troponin T <6 <14 ng/L   Green Top (Gel)    Collection Time: 12/06/23 11:48 PM   Result Value  Ref Range    Extra Tube Hold for add-ons.    Lavender Top    Collection Time: 12/06/23 11:48 PM   Result Value Ref Range    Extra Tube hold for add-on    Gold Top - SST    Collection Time: 12/06/23 11:48 PM   Result Value Ref Range    Extra Tube Hold for add-ons.    Light Blue Top    Collection Time: 12/06/23 11:48 PM   Result Value Ref Range    Extra Tube Hold for add-ons.    CBC Auto Differential    Collection Time: 12/06/23 11:48 PM    Specimen: Blood   Result Value Ref Range    WBC 10.68 3.40 - 10.80 10*3/mm3    RBC 4.98 3.77 - 5.28 10*6/mm3    Hemoglobin 13.7 12.0 - 15.9 g/dL    Hematocrit 41.8 34.0 - 46.6 %    MCV 83.9 79.0 - 97.0 fL    MCH 27.5 26.6 - 33.0 pg    MCHC 32.8 31.5 - 35.7 g/dL    RDW 13.3 12.3 - 15.4 %    RDW-SD 40.0 37.0 - 54.0 fl    MPV 9.8 6.0 - 12.0 fL    Platelets 400 140 - 450 10*3/mm3    Neutrophil % 58.9 42.7 - 76.0 %    Lymphocyte % 30.9 19.6 - 45.3 %    Monocyte % 6.7 5.0 - 12.0 %    Eosinophil % 2.2 0.3 - 6.2 %    Basophil % 0.7 0.0 - 1.5 %    Immature Grans % 0.6 (H) 0.0 - 0.5 %    Neutrophils, Absolute 6.29 1.70 - 7.00 10*3/mm3    Lymphocytes, Absolute 3.30 (H) 0.70 - 3.10 10*3/mm3    Monocytes, Absolute 0.72 0.10 - 0.90 10*3/mm3    Eosinophils, Absolute 0.24 0.00 - 0.40 10*3/mm3    Basophils, Absolute 0.07 0.00 - 0.20 10*3/mm3    Immature Grans, Absolute 0.06 (H) 0.00 - 0.05 10*3/mm3    nRBC 0.1 0.0 - 0.2 /100 WBC   Magnesium    Collection Time: 12/06/23 11:48 PM    Specimen: Blood   Result Value Ref Range    Magnesium 2.1 1.6 - 2.6 mg/dL   High Sensitivity Troponin T 2Hr    Collection Time: 12/07/23  2:40 AM    Specimen: Blood   Result Value Ref Range    HS Troponin T <6 <14 ng/L    Troponin T Delta         Ordered the above labs and reviewed the results.        RADIOLOGY  XR Chest 1 View    Result Date: 12/7/2023  Patient: IBRAHIMA LIZ  Time Out: 02:24 Exam(s): XR CXR 1 VIEW EXAM:   XR Chest, 1 View CLINICAL HISTORY:    Reason for exam: Chest Pain Triage Protocol. TECHNIQUE:    Frontal view of the chest. COMPARISON:   No relevant prior studies available. FINDINGS:   Lungs:  Unremarkable.  No consolidation.   Pleural space:  Unremarkable.  No pneumothorax.   Heart:  Unremarkable.  No cardiomegaly.   Mediastinum:  Unremarkable.  Normal mediastinal contour.   Bones joints:  Unremarkable.  No acute fracture. IMPRESSION:       Normal chest x-ray.     Electronically signed by Raquel Khan MD on 12-07-23 at 0224     Ordered the above noted radiological studies. Reviewed by me in PACS.          PROCEDURES  Procedures          MEDICATIONS GIVEN IN ER  Medications   sodium chloride 0.9 % flush 10 mL (has no administration in time range)   aspirin tablet 325 mg (0 mg Oral Hold 12/7/23 0116)   potassium chloride (K-DUR,KLOR-CON) ER tablet 40 mEq (40 mEq Oral Given 12/7/23 0309)         MEDICAL DECISION MAKING, PROGRESS, and CONSULTS    Discussion below represents my analysis of pertinent findings related to patient's condition, differential diagnosis, treatment plan and final disposition.      Orders placed during this visit:  Orders Placed This Encounter   Procedures    XR Chest 1 View    [place] Draw    Comprehensive Metabolic Panel    High Sensitivity Troponin T    CBC Auto Differential    High Sensitivity Troponin T 2Hr    Magnesium    NPO Diet NPO Type: Strict NPO    Undress & Gown    Continuous Pulse Oximetry    Oxygen Therapy- Nasal Cannula; Titrate 1-6 LPM Per SpO2; 90 - 95%    ECG 12 Lead Chest Pain    ECG 12 Lead ED Triage Standing Order; Chest Pain    Insert Peripheral IV    CBC & Differential    Green Top (Gel)    Lavender Top    Gold Top - SST    Light Blue Top         Additional sources:  - Discussed/obtained information from independent historians: None available  Additional information was obtained to confirm the patient's history.    - External (non-ED) record review: Patient followed by Dr. Carlo Sanderson.  On 8/29/2022 patient had a bilateral salpingectomy performed  laparoscopically.  Procedure was documented as uncomplicated.  Patient was taken to the PACU, observed, continue to do well and was discharged home with close follow-up.            - Chronic or social conditions impacting care: None        Differential diagnosis:    ACS, PTX, PE, SVT, A-fib, AV block, PVCs, PACs, sick sinus syndrome, PNA, pneumomediastinum.  Will obtain CBC, CMP, magnesium, chest x-ray, EKG, troponin x 2.            Independent interpretation of labs, radiology studies, and discussions with consultants:  ED Course as of 12/07/23 0606   Thu Dec 07, 2023   0508 WBC: 10.68 [RC]   0509 RBC: 4.98 [RC]   0509 Hemoglobin: 13.7 [RC]   0509 Hematocrit: 41.8 [RC]   0509 Platelets: 400 [RC]   0509 Glucose: 88 [RC]   0509 BUN: 17 [RC]   0509 Creatinine: 0.65 [RC]   0509 Sodium: 139 [RC]   0509 Potassium(!): 2.8  Noted.  Patient is on chlorthalidone.  Correction was initiated with  40 p.o. potassium. [RC]   0509 Magnesium: 2.1 [RC]   0509 HS Troponin T: <6 [RC]   0511 EKG          EKG time: 6 December 2023  Rhythm/Rate: 77/sinus  P waves and NM: Normal NM interval  QRS, axis: Normal axis  ST and T waves: Nonspecific T wave changes lateral leads    Interpreted Contemporaneously by me, independently viewed  -No prior to compare [RC]   0513 HEART SCORE    History Slightly or non-suspicious (0)  ECG Nonspecific repol disturbance (1)  Age < or = 45 (0)  Risk factors 1 or 2 (1)  Troponin < or = Normal limit (0)    This patient's HEART score is 1    HEART Score Key:  Scores 0-3: 0.9-1.7% risk of adverse cardiac event. In the HEART Score study, these patients were discharged (0.99% in the retrospective study, 1.7% in the prospective study)  Scores 4-6: 12-16.6% risk of adverse cardiac event. In the HEART Score study, these patients were admitted to the hospital. (11.6% retrospective, 16.6% prospective)  Scores ?7: 50-65% risk of adverse cardiac event. In the HEART Score study, these patients were candidates for early  invasive measures. (65.2% retrospective, 50.1% prospective)    [RC]   0513 Patient is chest discomfort seems more like palpitations.  Heart score is a 1.  2 high-sensitivity troponins less than 6.  I can see in the patient's chart she has been seen before by Dr. Rasmussen in 2016 for palpitations.  Will ensure the patient follows up with cardiology for further evaluation.  Will also ensure she follows up with her PCP in regards to her low potassium.  Suspect this is caused by her chlorthalidone.  Will treat with p.o. potassium for 5 days and ensure the patient continues to eat a potassium rich diet. [RC]      ED Course User Index  [RC] Austen Bose III, PA               DIAGNOSIS  Final diagnoses:   Palpitations   Hypokalemia   Atypical chest pain         DISPOSITION  DISCHARGE    Patient discharged in stable condition.    Reviewed implications of results, diagnosis, meds, responsibility to follow up, warning signs and symptoms of possible worsening, potential complications and reasons to return to ER.    Patient/Family voiced understanding of above instructions.    Discussed plan for discharge, as there is no emergent indication for admission. Patient referred to primary care provider for BP management due to today's BP. Pt/family is agreeable and understands need for follow up and repeat testing.  Pt is aware that discharge does not mean that nothing is wrong but it indicates no emergency is present that requires admission and they must continue care with follow-up as given below or physician of their choice.     FOLLOW-UP  Aneesh Coto MD  [address]  [place] [address] [phone]    Schedule an appointment as soon as possible for a visit   For further evaluation and treatment    [facility] CARDIOLOGY  [address]  [address]  [place] [address] [phone]  Schedule an appointment as soon as possible for a visit   For further evaluation and treatment          Medication List        New Prescriptions      potassium chloride 10 MEQ CR tablet  Take 2 tablets by mouth Daily for 5 days.               Where to Get Your Medications        These medications were sent to [facility] [id number] - [place] - [address] - [phone] PH - [phone] FX  [address] [place] [address] [place] [address]      Phone: [phone]   potassium chloride 10 MEQ CR tablet            Latest Documented Vital Signs:  As of 06:06 EST  BP- 127/79 HR- 81 Temp- 97.7 °F (36.5 °C) (Tympanic) O2 sat- 94%      --    Please note that portions of this were completed with a voice recognition program.       Note Disclaimer: At [facility], we believe that sharing information builds trust and better relationships. You are receiving this note because you are receiving care at [facility] or recently visited. It is possible you will see health information before a provider has talked with you about it. This kind of information can be easy to misunderstand. To help you fully understand what it means for your health, we urge you to discuss this note with your provider.         Austen Bose III, PA  12/07/23 0606

## 2023-12-07 NOTE — ED PROVIDER NOTES
MD ATTESTATION NOTE    The ITZEL and I have discussed this patient's history, physical exam, and treatment plan.  I have reviewed the documentation and personally had a face to face interaction with the patient. I affirm the documentation and agree with the treatment and plan.  The attached note describes my personal findings.      I provided a substantive portion of the care of the patient.  I personally performed the physical exam in its entirety, and below are my findings.        Brief HPI: This patient is a 28-year-old female presenting to the emergency room today with intermittent sharp chest discomfort that has been present now for greater than 1 week.  When it occurs, she does report associated shortness of breath.  Currently, she states that her pain is resolved and she is resting comfortably.  She denies back pain, nausea/vomiting, headache, or fever/chills.    PHYSICAL EXAM  ED Triage Vitals   Temp Heart Rate Resp BP SpO2   12/06/23 2317 12/06/23 2317 12/06/23 2317 12/06/23 2340 12/06/23 2317   97.7 °F (36.5 °C) 84 16 138/93 100 %      Temp src Heart Rate Source Patient Position BP Location FiO2 (%)   12/06/23 2317 -- -- -- --   Tympanic             GENERAL: Resting comfortably and in no acute distress, nontoxic in appearance  HENT: nares patent  EYES: no scleral icterus  CV: regular rhythm, normal rate, no M/R/G  RESPIRATORY: normal effort, lungs clear bilaterally  ABDOMEN: soft, nontender, no rebound or guarding  MUSCULOSKELETAL: no deformity, no edema  NEURO: alert, moves all extremities, follows commands  PSYCH:  calm, cooperative  SKIN: warm, dry    Vital signs and nursing notes reviewed.        Plan: We will obtain an EKG, chest x-ray, as well as labs including serial cardiac enzymes.  We will monitor and reassess following.       Cyrus Caraballo MD  12/07/23 0511

## 2023-12-10 NOTE — TELEPHONE ENCOUNTER
----- Message from Anali Del Angel MD sent at 7/20/2018  9:12 AM EDT -----  Please call patient and notify of normal results of one-hour gtt, but mildly anemic - new prenatal with iron sent to pharmacy   Pt medicated per MAR, additional labs drawn and sent.

## (undated) DEVICE — GLV SURG SIGNATURE ESSENTIAL PF LTX SZ8

## (undated) DEVICE — TROCAR: Brand: KII OPTICAL ACCESS SYSTEM

## (undated) DEVICE — ENDOPATH XCEL BLADELESS TROCARS WITH STABILITY SLEEVES: Brand: ENDOPATH XCEL

## (undated) DEVICE — LOU GYN LAPAROSCOPY: Brand: MEDLINE INDUSTRIES, INC.

## (undated) DEVICE — GLV SURG BIOGEL LTX PF 7 1/2

## (undated) DEVICE — SOL NACL 0.9PCT 1000ML

## (undated) DEVICE — HARMONIC ACE +7 LAPAROSCOPIC SHEARS ADVANCED HEMOSTASIS 5MM DIAMETER 36CM SHAFT LENGTH  FOR USE WITH GRAY HAND PIECE ONLY: Brand: HARMONIC ACE

## (undated) DEVICE — TROCAR: Brand: KII SLEEVE

## (undated) DEVICE — SUT MNCRYL 4/0 SH 27IN Y415H

## (undated) DEVICE — VISUALIZATION SYSTEM: Brand: CLEARIFY

## (undated) DEVICE — LAPAROSCOPIC SMOKE FILTRATION SYSTEM: Brand: PALL LAPAROSHIELD® PLUS LAPAROSCOPIC SMOKE FILTRATION SYSTEM

## (undated) DEVICE — 3M™ STERI-STRIP™ REINFORCED ADHESIVE SKIN CLOSURES, R1546, 1/4 IN X 4 IN (6 MM X 100 MM), 10 STRIPS/ENVELOPE: Brand: 3M™ STERI-STRIP™

## (undated) DEVICE — ENDOPATH PNEUMONEEDLE INSUFFLATION NEEDLES WITH LUER LOCK CONNECTORS 120MM: Brand: ENDOPATH